# Patient Record
Sex: FEMALE | Race: OTHER | HISPANIC OR LATINO | URBAN - METROPOLITAN AREA
[De-identification: names, ages, dates, MRNs, and addresses within clinical notes are randomized per-mention and may not be internally consistent; named-entity substitution may affect disease eponyms.]

---

## 2024-01-01 ENCOUNTER — INPATIENT (INPATIENT)
Facility: HOSPITAL | Age: 24
LOS: 10 days | Discharge: HOME | DRG: 682 | End: 2024-01-12
Attending: STUDENT IN AN ORGANIZED HEALTH CARE EDUCATION/TRAINING PROGRAM | Admitting: STUDENT IN AN ORGANIZED HEALTH CARE EDUCATION/TRAINING PROGRAM
Payer: MEDICAID

## 2024-01-01 VITALS
RESPIRATION RATE: 36 BRPM | SYSTOLIC BLOOD PRESSURE: 113 MMHG | TEMPERATURE: 99 F | OXYGEN SATURATION: 99 % | DIASTOLIC BLOOD PRESSURE: 79 MMHG | HEART RATE: 120 BPM

## 2024-01-01 DIAGNOSIS — N17.9 ACUTE KIDNEY FAILURE, UNSPECIFIED: ICD-10-CM

## 2024-01-01 LAB
ALBUMIN SERPL ELPH-MCNC: 3.9 G/DL — SIGNIFICANT CHANGE UP (ref 3.3–5.2)
ALBUMIN SERPL ELPH-MCNC: 3.9 G/DL — SIGNIFICANT CHANGE UP (ref 3.3–5.2)
ALP SERPL-CCNC: 58 U/L — SIGNIFICANT CHANGE UP (ref 40–120)
ALP SERPL-CCNC: 58 U/L — SIGNIFICANT CHANGE UP (ref 40–120)
ALT FLD-CCNC: 11 U/L — SIGNIFICANT CHANGE UP
ALT FLD-CCNC: 11 U/L — SIGNIFICANT CHANGE UP
AMPHET UR-MCNC: NEGATIVE — SIGNIFICANT CHANGE UP
AMPHET UR-MCNC: NEGATIVE — SIGNIFICANT CHANGE UP
ANION GAP SERPL CALC-SCNC: 15 MMOL/L — SIGNIFICANT CHANGE UP (ref 5–17)
ANION GAP SERPL CALC-SCNC: 15 MMOL/L — SIGNIFICANT CHANGE UP (ref 5–17)
APAP SERPL-MCNC: <3 UG/ML — LOW (ref 10–26)
APAP SERPL-MCNC: <3 UG/ML — LOW (ref 10–26)
AST SERPL-CCNC: 27 U/L — SIGNIFICANT CHANGE UP
AST SERPL-CCNC: 27 U/L — SIGNIFICANT CHANGE UP
BARBITURATES UR SCN-MCNC: NEGATIVE — SIGNIFICANT CHANGE UP
BARBITURATES UR SCN-MCNC: NEGATIVE — SIGNIFICANT CHANGE UP
BASOPHILS # BLD AUTO: 0.04 K/UL — SIGNIFICANT CHANGE UP (ref 0–0.2)
BASOPHILS # BLD AUTO: 0.04 K/UL — SIGNIFICANT CHANGE UP (ref 0–0.2)
BASOPHILS NFR BLD AUTO: 0.7 % — SIGNIFICANT CHANGE UP (ref 0–2)
BASOPHILS NFR BLD AUTO: 0.7 % — SIGNIFICANT CHANGE UP (ref 0–2)
BENZODIAZ UR-MCNC: POSITIVE
BENZODIAZ UR-MCNC: POSITIVE
BILIRUB SERPL-MCNC: 0.3 MG/DL — LOW (ref 0.4–2)
BILIRUB SERPL-MCNC: 0.3 MG/DL — LOW (ref 0.4–2)
BUN SERPL-MCNC: 30.2 MG/DL — HIGH (ref 8–20)
BUN SERPL-MCNC: 30.2 MG/DL — HIGH (ref 8–20)
CALCIUM SERPL-MCNC: 8.4 MG/DL — SIGNIFICANT CHANGE UP (ref 8.4–10.5)
CALCIUM SERPL-MCNC: 8.4 MG/DL — SIGNIFICANT CHANGE UP (ref 8.4–10.5)
CALCIUM UR-MCNC: 3.5 MG/DL — SIGNIFICANT CHANGE UP
CALCIUM UR-MCNC: 3.5 MG/DL — SIGNIFICANT CHANGE UP
CHLORIDE SERPL-SCNC: 108 MMOL/L — SIGNIFICANT CHANGE UP (ref 96–108)
CHLORIDE SERPL-SCNC: 108 MMOL/L — SIGNIFICANT CHANGE UP (ref 96–108)
CHLORIDE UR-SCNC: 35 MMOL/L — SIGNIFICANT CHANGE UP
CHLORIDE UR-SCNC: 35 MMOL/L — SIGNIFICANT CHANGE UP
CK SERPL-CCNC: 106 U/L — SIGNIFICANT CHANGE UP (ref 25–170)
CK SERPL-CCNC: 106 U/L — SIGNIFICANT CHANGE UP (ref 25–170)
CK SERPL-CCNC: 141 U/L — SIGNIFICANT CHANGE UP (ref 25–170)
CK SERPL-CCNC: 141 U/L — SIGNIFICANT CHANGE UP (ref 25–170)
CO2 SERPL-SCNC: 17 MMOL/L — LOW (ref 22–29)
CO2 SERPL-SCNC: 17 MMOL/L — LOW (ref 22–29)
COCAINE METAB.OTHER UR-MCNC: NEGATIVE — SIGNIFICANT CHANGE UP
COCAINE METAB.OTHER UR-MCNC: NEGATIVE — SIGNIFICANT CHANGE UP
CREAT SERPL-MCNC: 3.95 MG/DL — HIGH (ref 0.5–1.3)
CREAT SERPL-MCNC: 3.95 MG/DL — HIGH (ref 0.5–1.3)
CREAT SERPL-MCNC: 4.33 MG/DL — HIGH (ref 0.5–1.3)
CREAT SERPL-MCNC: 4.33 MG/DL — HIGH (ref 0.5–1.3)
EGFR: 13 ML/MIN/1.73M2 — LOW
EGFR: 13 ML/MIN/1.73M2 — LOW
EGFR: 14 ML/MIN/1.73M2 — LOW
EGFR: 14 ML/MIN/1.73M2 — LOW
EOSINOPHIL # BLD AUTO: 0.24 K/UL — SIGNIFICANT CHANGE UP (ref 0–0.5)
EOSINOPHIL # BLD AUTO: 0.24 K/UL — SIGNIFICANT CHANGE UP (ref 0–0.5)
EOSINOPHIL NFR BLD AUTO: 4.3 % — SIGNIFICANT CHANGE UP (ref 0–6)
EOSINOPHIL NFR BLD AUTO: 4.3 % — SIGNIFICANT CHANGE UP (ref 0–6)
ETHANOL SERPL-MCNC: <10 MG/DL — SIGNIFICANT CHANGE UP (ref 0–9)
ETHANOL SERPL-MCNC: <10 MG/DL — SIGNIFICANT CHANGE UP (ref 0–9)
GLUCOSE SERPL-MCNC: 87 MG/DL — SIGNIFICANT CHANGE UP (ref 70–99)
GLUCOSE SERPL-MCNC: 87 MG/DL — SIGNIFICANT CHANGE UP (ref 70–99)
HBV CORE IGM SER-ACNC: SIGNIFICANT CHANGE UP
HBV CORE IGM SER-ACNC: SIGNIFICANT CHANGE UP
HBV SURFACE AB SER-ACNC: SIGNIFICANT CHANGE UP
HBV SURFACE AB SER-ACNC: SIGNIFICANT CHANGE UP
HBV SURFACE AG SER-ACNC: SIGNIFICANT CHANGE UP
HBV SURFACE AG SER-ACNC: SIGNIFICANT CHANGE UP
HCG SERPL-ACNC: <4 MIU/ML — SIGNIFICANT CHANGE UP
HCG SERPL-ACNC: <4 MIU/ML — SIGNIFICANT CHANGE UP
HCT VFR BLD CALC: 26.9 % — LOW (ref 34.5–45)
HCT VFR BLD CALC: 26.9 % — LOW (ref 34.5–45)
HCV AB S/CO SERPL IA: 0.12 S/CO — SIGNIFICANT CHANGE UP (ref 0–0.99)
HCV AB S/CO SERPL IA: 0.12 S/CO — SIGNIFICANT CHANGE UP (ref 0–0.99)
HCV AB SERPL-IMP: SIGNIFICANT CHANGE UP
HCV AB SERPL-IMP: SIGNIFICANT CHANGE UP
HGB BLD-MCNC: 8.6 G/DL — LOW (ref 11.5–15.5)
HGB BLD-MCNC: 8.6 G/DL — LOW (ref 11.5–15.5)
IMM GRANULOCYTES NFR BLD AUTO: 0.4 % — SIGNIFICANT CHANGE UP (ref 0–0.9)
IMM GRANULOCYTES NFR BLD AUTO: 0.4 % — SIGNIFICANT CHANGE UP (ref 0–0.9)
LYMPHOCYTES # BLD AUTO: 1.1 K/UL — SIGNIFICANT CHANGE UP (ref 1–3.3)
LYMPHOCYTES # BLD AUTO: 1.1 K/UL — SIGNIFICANT CHANGE UP (ref 1–3.3)
LYMPHOCYTES # BLD AUTO: 19.9 % — SIGNIFICANT CHANGE UP (ref 13–44)
LYMPHOCYTES # BLD AUTO: 19.9 % — SIGNIFICANT CHANGE UP (ref 13–44)
MCHC RBC-ENTMCNC: 27.9 PG — SIGNIFICANT CHANGE UP (ref 27–34)
MCHC RBC-ENTMCNC: 27.9 PG — SIGNIFICANT CHANGE UP (ref 27–34)
MCHC RBC-ENTMCNC: 32 GM/DL — SIGNIFICANT CHANGE UP (ref 32–36)
MCHC RBC-ENTMCNC: 32 GM/DL — SIGNIFICANT CHANGE UP (ref 32–36)
MCV RBC AUTO: 87.3 FL — SIGNIFICANT CHANGE UP (ref 80–100)
MCV RBC AUTO: 87.3 FL — SIGNIFICANT CHANGE UP (ref 80–100)
METHADONE UR-MCNC: NEGATIVE — SIGNIFICANT CHANGE UP
METHADONE UR-MCNC: NEGATIVE — SIGNIFICANT CHANGE UP
MONOCYTES # BLD AUTO: 0.72 K/UL — SIGNIFICANT CHANGE UP (ref 0–0.9)
MONOCYTES # BLD AUTO: 0.72 K/UL — SIGNIFICANT CHANGE UP (ref 0–0.9)
MONOCYTES NFR BLD AUTO: 13 % — SIGNIFICANT CHANGE UP (ref 2–14)
MONOCYTES NFR BLD AUTO: 13 % — SIGNIFICANT CHANGE UP (ref 2–14)
NEUTROPHILS # BLD AUTO: 3.4 K/UL — SIGNIFICANT CHANGE UP (ref 1.8–7.4)
NEUTROPHILS # BLD AUTO: 3.4 K/UL — SIGNIFICANT CHANGE UP (ref 1.8–7.4)
NEUTROPHILS NFR BLD AUTO: 61.7 % — SIGNIFICANT CHANGE UP (ref 43–77)
NEUTROPHILS NFR BLD AUTO: 61.7 % — SIGNIFICANT CHANGE UP (ref 43–77)
OPIATES UR-MCNC: NEGATIVE — SIGNIFICANT CHANGE UP
OPIATES UR-MCNC: NEGATIVE — SIGNIFICANT CHANGE UP
PCP SPEC-MCNC: SIGNIFICANT CHANGE UP
PCP SPEC-MCNC: SIGNIFICANT CHANGE UP
PCP UR-MCNC: NEGATIVE — SIGNIFICANT CHANGE UP
PCP UR-MCNC: NEGATIVE — SIGNIFICANT CHANGE UP
PLATELET # BLD AUTO: 257 K/UL — SIGNIFICANT CHANGE UP (ref 150–400)
PLATELET # BLD AUTO: 257 K/UL — SIGNIFICANT CHANGE UP (ref 150–400)
POTASSIUM SERPL-MCNC: 4.6 MMOL/L — SIGNIFICANT CHANGE UP (ref 3.5–5.3)
POTASSIUM SERPL-MCNC: 4.6 MMOL/L — SIGNIFICANT CHANGE UP (ref 3.5–5.3)
POTASSIUM SERPL-SCNC: 4.6 MMOL/L — SIGNIFICANT CHANGE UP (ref 3.5–5.3)
POTASSIUM SERPL-SCNC: 4.6 MMOL/L — SIGNIFICANT CHANGE UP (ref 3.5–5.3)
POTASSIUM UR-SCNC: 33 MMOL/L — SIGNIFICANT CHANGE UP
POTASSIUM UR-SCNC: 33 MMOL/L — SIGNIFICANT CHANGE UP
PROT SERPL-MCNC: 7.1 G/DL — SIGNIFICANT CHANGE UP (ref 6.6–8.7)
PROT SERPL-MCNC: 7.1 G/DL — SIGNIFICANT CHANGE UP (ref 6.6–8.7)
RBC # BLD: 3.08 M/UL — LOW (ref 3.8–5.2)
RBC # BLD: 3.08 M/UL — LOW (ref 3.8–5.2)
RBC # FLD: 15.4 % — HIGH (ref 10.3–14.5)
RBC # FLD: 15.4 % — HIGH (ref 10.3–14.5)
SALICYLATES SERPL-MCNC: <0.6 MG/DL — LOW (ref 10–20)
SALICYLATES SERPL-MCNC: <0.6 MG/DL — LOW (ref 10–20)
SODIUM SERPL-SCNC: 140 MMOL/L — SIGNIFICANT CHANGE UP (ref 135–145)
SODIUM SERPL-SCNC: 140 MMOL/L — SIGNIFICANT CHANGE UP (ref 135–145)
SODIUM UR-SCNC: 42 MMOL/L — SIGNIFICANT CHANGE UP
SODIUM UR-SCNC: 42 MMOL/L — SIGNIFICANT CHANGE UP
THC UR QL: NEGATIVE — SIGNIFICANT CHANGE UP
THC UR QL: NEGATIVE — SIGNIFICANT CHANGE UP
WBC # BLD: 5.52 K/UL — SIGNIFICANT CHANGE UP (ref 3.8–10.5)
WBC # BLD: 5.52 K/UL — SIGNIFICANT CHANGE UP (ref 3.8–10.5)
WBC # FLD AUTO: 5.52 K/UL — SIGNIFICANT CHANGE UP (ref 3.8–10.5)
WBC # FLD AUTO: 5.52 K/UL — SIGNIFICANT CHANGE UP (ref 3.8–10.5)

## 2024-01-01 PROCEDURE — 70450 CT HEAD/BRAIN W/O DYE: CPT | Mod: 26,MG

## 2024-01-01 PROCEDURE — 99285 EMERGENCY DEPT VISIT HI MDM: CPT

## 2024-01-01 PROCEDURE — 99222 1ST HOSP IP/OBS MODERATE 55: CPT

## 2024-01-01 PROCEDURE — G1004: CPT

## 2024-01-01 PROCEDURE — 74176 CT ABD & PELVIS W/O CONTRAST: CPT | Mod: 26

## 2024-01-01 PROCEDURE — 76770 US EXAM ABDO BACK WALL COMP: CPT | Mod: 26

## 2024-01-01 RX ORDER — ONDANSETRON 8 MG/1
4 TABLET, FILM COATED ORAL EVERY 8 HOURS
Refills: 0 | Status: DISCONTINUED | OUTPATIENT
Start: 2024-01-01 | End: 2024-01-12

## 2024-01-01 RX ORDER — LANOLIN ALCOHOL/MO/W.PET/CERES
3 CREAM (GRAM) TOPICAL AT BEDTIME
Refills: 0 | Status: DISCONTINUED | OUTPATIENT
Start: 2024-01-01 | End: 2024-01-12

## 2024-01-01 RX ORDER — ENOXAPARIN SODIUM 100 MG/ML
30 INJECTION SUBCUTANEOUS EVERY 24 HOURS
Refills: 0 | Status: DISCONTINUED | OUTPATIENT
Start: 2024-01-01 | End: 2024-01-02

## 2024-01-01 RX ORDER — FOLIC ACID 0.8 MG
1 TABLET ORAL DAILY
Refills: 0 | Status: DISCONTINUED | OUTPATIENT
Start: 2024-01-01 | End: 2024-01-12

## 2024-01-01 RX ORDER — SODIUM CHLORIDE 9 MG/ML
1000 INJECTION, SOLUTION INTRAVENOUS
Refills: 0 | Status: DISCONTINUED | OUTPATIENT
Start: 2024-01-01 | End: 2024-01-01

## 2024-01-01 RX ORDER — SODIUM CHLORIDE 9 MG/ML
1000 INJECTION, SOLUTION INTRAVENOUS
Refills: 0 | Status: DISCONTINUED | OUTPATIENT
Start: 2024-01-01 | End: 2024-01-02

## 2024-01-01 RX ORDER — SODIUM CHLORIDE 9 MG/ML
3 INJECTION INTRAMUSCULAR; INTRAVENOUS; SUBCUTANEOUS EVERY 8 HOURS
Refills: 0 | Status: DISCONTINUED | OUTPATIENT
Start: 2024-01-01 | End: 2024-01-12

## 2024-01-01 RX ORDER — ACETAMINOPHEN 500 MG
650 TABLET ORAL EVERY 6 HOURS
Refills: 0 | Status: DISCONTINUED | OUTPATIENT
Start: 2024-01-01 | End: 2024-01-12

## 2024-01-01 RX ORDER — ENOXAPARIN SODIUM 100 MG/ML
40 INJECTION SUBCUTANEOUS EVERY 24 HOURS
Refills: 0 | Status: DISCONTINUED | OUTPATIENT
Start: 2024-01-01 | End: 2024-01-01

## 2024-01-01 RX ADMIN — Medication 2 MILLIGRAM(S): at 03:21

## 2024-01-01 RX ADMIN — Medication 650 MILLIGRAM(S): at 23:32

## 2024-01-01 RX ADMIN — Medication 1 MILLIGRAM(S): at 12:26

## 2024-01-01 RX ADMIN — SODIUM CHLORIDE 100 MILLILITER(S): 9 INJECTION, SOLUTION INTRAVENOUS at 12:26

## 2024-01-01 RX ADMIN — Medication 1 TABLET(S): at 12:26

## 2024-01-01 RX ADMIN — Medication 650 MILLIGRAM(S): at 23:02

## 2024-01-01 RX ADMIN — SODIUM CHLORIDE 3 MILLILITER(S): 9 INJECTION INTRAMUSCULAR; INTRAVENOUS; SUBCUTANEOUS at 14:35

## 2024-01-01 RX ADMIN — SODIUM CHLORIDE 3 MILLILITER(S): 9 INJECTION INTRAMUSCULAR; INTRAVENOUS; SUBCUTANEOUS at 05:58

## 2024-01-01 RX ADMIN — SODIUM CHLORIDE 3 MILLILITER(S): 9 INJECTION INTRAMUSCULAR; INTRAVENOUS; SUBCUTANEOUS at 23:07

## 2024-01-01 NOTE — CONSULT NOTE ADULT - ASSESSMENT
A) RUQ abdominal pain, Hx  CRF not presently  followed; Anemia, metabolic  acidosis.    P) Sono, iv  fluid  with  bicarb.. Urine  studies.

## 2024-01-01 NOTE — ED ADULT NURSE REASSESSMENT NOTE - NS ED NURSE REASSESS COMMENT FT1
pt seen by renal, Dr Gallego, for evaluation. pt with hx of CKD, but stopped receiving treatment. pt awake, alert, taken to bathroom in wheelchair. following all commands. even and unlabored resps present.

## 2024-01-01 NOTE — CHART NOTE - NSCHARTNOTEFT_GEN_A_CORE
patient seen at bedside and still complains of fatigue. patient seen with phone trnasltor.   a/p  as per hpi  type and screen ordered   ct abdomen ordered for abnromal renal us

## 2024-01-01 NOTE — ED ADULT NURSE NOTE - OBJECTIVE STATEMENT
Pt is 34 year old male BIBA for convulsing in [employer] basement for unknown period of time. pt denies taking any drugs or alcohol use. pt states she has history of anxiety but denies taking any drugs or drinking. pt placed in yellow gown on monitor. according to EMS pt given versed and controlled convulsions. Pt is 34 year old male BIBA for convulsing in Muslim basement for unknown period of time. pt denies taking any drugs or alcohol use. pt states she has history of anxiety but denies taking any drugs or drinking. pt placed in yellow gown on monitor. according to EMS pt given versed and controlled convulsions.

## 2024-01-01 NOTE — ED ADULT TRIAGE NOTE - CHIEF COMPLAINT QUOTE
pateint found in [employer] basement, answering questions stating she has pain all over, hyperventilating, stating her throat is sore, given 5mg versed, no trauma reported, patient is denying drugs or alcohol pateint found in [employer] basement, answering questions stating she has pain all over, hyperventilating, stating her throat is sore, given 5mg versed, no trauma reported, patient is denying drugs or alcohol

## 2024-01-01 NOTE — ED PROVIDER NOTE - CLINICAL SUMMARY MEDICAL DECISION MAKING FREE TEXT BOX
patient presenting with altered mental status, no signs of trauma, very anxious appearing, will obtain labs, tox workup, Ativan given for symptom control with improvement.  Observe on cardiac monitor

## 2024-01-01 NOTE — ED PROVIDER NOTE - OBJECTIVE STATEMENT
Patient is a 34-year-old female with  unknown past medical history presenting with altered mental status.  According to EMS patient was found at [facility] and was found acting erratic.  EMS notes when they arrived on the scene patient was thrashing about unable to obtain further history.     Patient was given 5 mg of IV Versed by EMS for symptom control with significant improvement in symptoms that patient still symptomatic on arrival. No one on scene seem to know much about the patient's past medical history but denied her ingesting any alcohol or drugs this evening.  On arrival to emergency department patient hyperventilating tearful stating she has pain all over.  Rest of history limited secondary to patient's mental status. Patient is a 34-year-old female with  unknown past medical history presenting with altered mental status.  According to EMS patient was found at [facility] and was found acting erratic.  EMS notes when they arrived on the scene patient was thrashing about unable to obtain further history.     Patient was given 5 mg of IV Versed by EMS for symptom control with significant improvement in symptoms that patient still symptomatic on arrival. No one on scene seem to know much about the patient's past medical history but denied her ingesting any alcohol or drugs this evening.  On arrival to emergency department patient hyperventilating tearful stating she has pain all over.  Rest of history limited secondary to patient's mental status.

## 2024-01-01 NOTE — H&P ADULT - ASSESSMENT
22 y/o female with episode of agitation, MARIVEL on CKD? Anemia     Agitation:  -No prodrome, related to food ingestion by history  -Panic attack? Pain related?   -CTH neg  -Currently sedated but vitals stable, arouses to verbal/tactile stimuli  -Cont. Monitor  -fall risk  -NPO except meds for now  -Utox  -VC boots  -Await  to call back or come in for more accurate collateral information/Medical history     MARIVEL on CKD?  -Reported to ED attending baseline 2.5  -Check CPK   -Bicarb low likely due to CKD/lytes normal  -AG normal  -Check U/A  -IVF, trend BMP  -Nephrology called by ED  -US pending    Anemia:  -Likely on basis of CKD  -Check iron studies, stool for OB  -FA/MVI  -Repeat CBC in AM    Discussed with Friends/ED Nursing/Attending   No ED /language line not working at this time 24 y/o female with episode of agitation, MARIVEL on CKD? Anemia     Agitation:  -No prodrome, related to food ingestion by history  -Panic attack? Pain related?   -CTH neg  -Currently sedated but vitals stable, arouses to verbal/tactile stimuli  -Cont. Monitor  -fall risk  -NPO except meds for now  -Utox  -VC boots  -Await  to call back or come in for more accurate collateral information/Medical history     MARIVEL on CKD?  -Reported to ED attending baseline 2.5  -Check CPK   -Bicarb low likely due to CKD/lytes normal  -AG normal  -Check U/A  -IVF, trend BMP  -Nephrology called by ED  -US pending    Anemia:  -Likely on basis of CKD  -Check iron studies, stool for OB  -FA/MVI  -Repeat CBC in AM    Discussed with Friends/ED Nursing/Attending   No ED /language line not working at this time

## 2024-01-01 NOTE — ED ADULT NURSE NOTE - NSFALLUNIVINTERV_ED_ALL_ED
Bed/Stretcher in lowest position, wheels locked, appropriate side rails in place/Call bell, personal items and telephone in reach/Instruct patient to call for assistance before getting out of bed/chair/stretcher/Non-slip footwear applied when patient is off stretcher/[place] to call system/Physically safe environment - no spills, clutter or unnecessary equipment/Purposeful proactive rounding/Room/bathroom lighting operational, light cord in reach Bed/Stretcher in lowest position, wheels locked, appropriate side rails in place/Call bell, personal items and telephone in reach/Instruct patient to call for assistance before getting out of bed/chair/stretcher/Non-slip footwear applied when patient is off stretcher/[place] to call system/Physically safe environment - no spills, clutter or unnecessary equipment/Purposeful proactive rounding/Room/bathroom lighting operational, light cord in reach

## 2024-01-01 NOTE — ED ADULT NURSE REASSESSMENT NOTE - NS ED NURSE REASSESS COMMENT FT1
pt received in shift change report, pt sitting up in cart sleeping, arousable to tactile stimuli. pt is in yellow gown, stable vitals noted, awaiting transport to [facility] at this time. even and unlabored resps present, IV site patent, NSR on monitor noted. pt received in shift change report, pt sitting up in cart sleeping, arousable to tactile stimuli. pt is in yellow gown, stable vitals noted, awaiting transport to [facility] at this time. even and unlabored resps present, IV site patent, NSR on monitor noted.

## 2024-01-01 NOTE — H&P ADULT - REASON FOR ADMISSION
MARIVEL on CKD?  Confusion Principal Discharge DX:	Sinusitis  Secondary Diagnosis:	Dehydration  Secondary Diagnosis:	Proctocolitis

## 2024-01-01 NOTE — ED ADULT NURSE REASSESSMENT NOTE - NS ED NURSE REASSESS COMMENT FT1
pt remains sleeping, resting comfortably, even and unlabored resps present with otherwise stable vital signs. NSR on monitor, CT completed and awaiting results. pt awaiting bed assignment. ambulatory to bathroom with assistance. tolerating PO. IV site patent, Bicarb gtt infusing as ordered. pt more awake, alert to person place and time.

## 2024-01-01 NOTE — H&P ADULT - NSICDXPASTSURGICALHX_GEN_ALL_CORE_FT
[facility]    Brief Operative Note    Pre-operative diagnosis: Laryngeal cancer (H) [C32.9]  Post-operative diagnosis Same as pre-operative diagnosis    Procedure: MICROLARYNGOSCOPY CO2 laser standby, resection of vocal fold lesion, stent removal, biopsy, N/A - Throat  steroid injection, N/A - Update    Surgeon: Surgeon(s) and Role:     * Josephine Malone MD - Primary     * Abril Sifuentes MD - Resident - Assisting  Anesthesia: General   Estimated Blood Loss: None    Drains: None  Specimens:   ID Type Source Tests Collected by Time Destination   1 : anterior commissure inferior biopsy Tissue Other SURGICAL PATHOLOGY EXAM Josephine Malone MD 12/1/2023  3:47 PM    2 : anterior commissure superior biopsy Tissue Other SURGICAL PATHOLOGY EXAM Josephine Malone MD 12/1/2023  3:48 PM      Findings:  Stent was removed. Anterior commissure masses observed.   Complications: None.  Implants:   Implant Name Type Inv. Item Serial No.  Lot No. LRB No. Used Action   CATH FOLYSIL 14FR 10ML [id number] - [id number]  CATH FOLYSIL 14FR 10ML [id number]  COLOPLAST [id number] N/A 1 Explanted              PAST SURGICAL HISTORY:  No significant past surgical history

## 2024-01-01 NOTE — CONSULT NOTE ADULT - SUBJECTIVE AND OBJECTIVE BOX
Patient is a 34y old  Female who presents with a chief complaint of MARIVEL on CKD?  Confusion (01 Jan 2024 05:57)   Acute  renal failure.  HPI:  24 y/o female whose real name is Trinh Clark. History obtained from 2 [employer]ian[employer] friends at bedside who arrived after Patient brought from field to ED for c/o acting strange at a [employer]ian[employer] event. Language line not working at this time, History obtained from ED chart and 2 friends who know patient peripherally. Patient was apparently in her normal state of health at a [employer]ian[employer] event and shortly after eating rice/beans and meat started c/o abdominal pain and cramping and then vomited. Friends who witnessed it said they called EMS due to patient being very agitated. No one else reportedly was sick from eating the same food. No recent illnesses, travel, or sick contacts reported. Patient has never used illicit drugs and does not use alcohol. She has a hx of "kidney problems" and used to be on medications for it but self discontinued " a while ago." In th field Patient given Versed and then Ativan in ED to allow for exam and labs. Vitals with tachycardia/tachypnea on arrival due to agitation. ED attending states patient denying any trauma, or assault. No obvious injury on exam. Labs with Hbg of 8.6 and Cr. of 3.95 with reported baseline of 2.5. No reported mental health history.     : Tita Clark [phone]-No answer at this time  (01 Jan 2024 05:57)   Hx renal stones x1 not aware of type, no other renal  problems; elevated creatinine on admission.    PAST MEDICAL & SURGICAL HISTORY:  Chronic kidney disease, unspecified CKD stage      No significant past surgical history          FAMILY HISTORY:  NC    Social History:Non smoker    MEDICATIONS  (STANDING):  dextrose 5% + sodium chloride 0.9%. 1000 milliLiter(s) (125 mL/Hr) IV Continuous <Continuous>  folic acid 1 milliGRAM(s) Oral daily  multivitamin 1 Tablet(s) Oral daily  sodium chloride 0.9% lock flush 3 milliLiter(s) IV Push every 8 hours    MEDICATIONS  (PRN):  acetaminophen     Tablet .. 650 milliGRAM(s) Oral every 6 hours PRN Temp greater or equal to 38C (100.4F), Mild Pain (1 - 3)  aluminum hydroxide/magnesium hydroxide/simethicone Suspension 30 milliLiter(s) Oral every 4 hours PRN Dyspepsia  LORazepam   Injectable 1 milliGRAM(s) IV Push every 8 hours PRN Agitation  melatonin 3 milliGRAM(s) Oral at bedtime PRN Insomnia  ondansetron Injectable 4 milliGRAM(s) IV Push every 8 hours PRN Nausea and/or Vomiting   Meds reviewed    Allergies    Allergy Status Unknown        REVIEW OF SYSTEMS:  As above    ALLERY AND IMMUNOLOGIC: No hives or eczema      Vital Signs Last 24 Hrs  T(C): 36.7 (01 Jan 2024 07:52), Max: 37.1 (01 Jan 2024 03:10)  T(F): 98.1 (01 Jan 2024 07:52), Max: 98.8 (01 Jan 2024 03:10)  HR: 88 (01 Jan 2024 07:52) (88 - 120)  BP: 105/57 (01 Jan 2024 07:52) (105/57 - 116/78)  BP(mean): 90 (01 Jan 2024 05:57) (90 - 90)  RR: 16 (01 Jan 2024 07:52) (16 - 36)  SpO2: 100% (01 Jan 2024 07:52) (98% - 100%)    Parameters below as of 01 Jan 2024 05:57  Patient On (Oxygen Delivery Method): room air           PHYSICAL EXAM:    GENERAL: appears acutely ill, oriented.  HEAD:  Atraumatic, Normocephalic  EYES: EOMI, PERRLA, conjunctiva and sclera clear  ENMT: No tonsillar erythema, exudates, or enlargement; Moist mucous membranes, Good dentition, No lesions  NECK: Supple, neck  veins wnl  NERVOUS SYSTEM:  Alert & Oriented X3, Good concentration; Motor Strength wnl upper and lower extremities  ;CHEST/LUNG: Clear to percussion bilaterally; No rales, rhonchi, wheezing, or rubs. No 02  HEART: Regular rate and rhythm; No murmurs, rubs, or gallops  ABDOMEN: Soft, tender  right side  EXTREMITIES:  +2 - +3 leg edema with sligt erythema edemano  edemahadenopathy noted  SKIN: No rashes or lesions, pale  : neg    LABS:                        8.6    5.52  )-----------( 257      ( 01 Jan 2024 03:50 )             26.9     01-01    140  |  108  |  30.2<H>  ----------------------------<  87  4.6   |  17.0<L>  |  3.95<H>    Ca    8.4      01 Jan 2024 03:50    TPro  7.1  /  Alb  3.9  /  TBili  0.3<L>  /  DBili  x   /  AST  27  /  ALT  11  /  AlkPhos  58  01-01      Urinalysis Basic - ( 01 Jan 2024 03:50 )    Color: x / Appearance: x / SG: x / pH: x  Gluc: 87 mg/dL / Ketone: x  / Bili: x / Urobili: x   Blood: x / Protein: x / Nitrite: x   Leuk Esterase: x / RBC: x / WBC x   Sq Epi: x / Non Sq Epi: x / Bacteria: x              RADIOLOGY & ADDITIONAL TESTS:   Patient is a 34y old  Female who presents with a chief complaint of MARIVEL on CKD?  Confusion (01 Jan 2024 05:57)   Acute  renal failure.  HPI:  22 y/o female whose real name is Trinh Clark. History obtained from 2 Gnosticism friends at bedside who arrived after Patient brought from field to ED for c/o acting strange at a Gnostic[employer] event. Language line not working at this time, History obtained from ED chart and 2 friends who know patient peripherally. Patient was apparently in her normal state of health at a Gnosticism event and shortly after eating rice/beans and meat started c/o abdominal pain and cramping and then vomited. Friends who witnessed it said they called EMS due to patient being very agitated. No one else reportedly was sick from eating the same food. No recent illnesses, travel, or sick contacts reported. Patient has never used illicit drugs and does not use alcohol. She has a hx of "kidney problems" and used to be on medications for it but self discontinued " a while ago." In th field Patient given Versed and then Ativan in ED to allow for exam and labs. Vitals with tachycardia/tachypnea on arrival due to agitation. ED attending states patient denying any trauma, or assault. No obvious injury on exam. Labs with Hbg of 8.6 and Cr. of 3.95 with reported baseline of 2.5. No reported mental health history.     : Tita Clark [phone]-No answer at this time  (01 Jan 2024 05:57)   Hx renal stones x1 not aware of type, no other renal  problems; elevated creatinine on admission.    PAST MEDICAL & SURGICAL HISTORY:  Chronic kidney disease, unspecified CKD stage      No significant past surgical history          FAMILY HISTORY:  NC    Social History:Non smoker    MEDICATIONS  (STANDING):  dextrose 5% + sodium chloride 0.9%. 1000 milliLiter(s) (125 mL/Hr) IV Continuous <Continuous>  folic acid 1 milliGRAM(s) Oral daily  multivitamin 1 Tablet(s) Oral daily  sodium chloride 0.9% lock flush 3 milliLiter(s) IV Push every 8 hours    MEDICATIONS  (PRN):  acetaminophen     Tablet .. 650 milliGRAM(s) Oral every 6 hours PRN Temp greater or equal to 38C (100.4F), Mild Pain (1 - 3)  aluminum hydroxide/magnesium hydroxide/simethicone Suspension 30 milliLiter(s) Oral every 4 hours PRN Dyspepsia  LORazepam   Injectable 1 milliGRAM(s) IV Push every 8 hours PRN Agitation  melatonin 3 milliGRAM(s) Oral at bedtime PRN Insomnia  ondansetron Injectable 4 milliGRAM(s) IV Push every 8 hours PRN Nausea and/or Vomiting   Meds reviewed    Allergies    Allergy Status Unknown        REVIEW OF SYSTEMS:  As above    ALLERY AND IMMUNOLOGIC: No hives or eczema      Vital Signs Last 24 Hrs  T(C): 36.7 (01 Jan 2024 07:52), Max: 37.1 (01 Jan 2024 03:10)  T(F): 98.1 (01 Jan 2024 07:52), Max: 98.8 (01 Jan 2024 03:10)  HR: 88 (01 Jan 2024 07:52) (88 - 120)  BP: 105/57 (01 Jan 2024 07:52) (105/57 - 116/78)  BP(mean): 90 (01 Jan 2024 05:57) (90 - 90)  RR: 16 (01 Jan 2024 07:52) (16 - 36)  SpO2: 100% (01 Jan 2024 07:52) (98% - 100%)    Parameters below as of 01 Jan 2024 05:57  Patient On (Oxygen Delivery Method): room air           PHYSICAL EXAM:    GENERAL: appears acutely ill, oriented.  HEAD:  Atraumatic, Normocephalic  EYES: EOMI, PERRLA, conjunctiva and sclera clear  ENMT: No tonsillar erythema, exudates, or enlargement; Moist mucous membranes, Good dentition, No lesions  NECK: Supple, neck  veins wnl  NERVOUS SYSTEM:  Alert & Oriented X3, Good concentration; Motor Strength wnl upper and lower extremities  ;CHEST/LUNG: Clear to percussion bilaterally; No rales, rhonchi, wheezing, or rubs. No 02  HEART: Regular rate and rhythm; No murmurs, rubs, or gallops  ABDOMEN: Soft, tender  right side  EXTREMITIES:  +2 - +3 leg edema with sligt erythema edemano  edemahadenopathy noted  SKIN: No rashes or lesions, pale  : neg    LABS:                        8.6    5.52  )-----------( 257      ( 01 Jan 2024 03:50 )             26.9     01-01    140  |  108  |  30.2<H>  ----------------------------<  87  4.6   |  17.0<L>  |  3.95<H>    Ca    8.4      01 Jan 2024 03:50    TPro  7.1  /  Alb  3.9  /  TBili  0.3<L>  /  DBili  x   /  AST  27  /  ALT  11  /  AlkPhos  58  01-01      Urinalysis Basic - ( 01 Jan 2024 03:50 )    Color: x / Appearance: x / SG: x / pH: x  Gluc: 87 mg/dL / Ketone: x  / Bili: x / Urobili: x   Blood: x / Protein: x / Nitrite: x   Leuk Esterase: x / RBC: x / WBC x   Sq Epi: x / Non Sq Epi: x / Bacteria: x              RADIOLOGY & ADDITIONAL TESTS:

## 2024-01-01 NOTE — ED ADULT NURSE NOTE - CHIEF COMPLAINT QUOTE
pateint found in [employer]ianist basement, answering questions stating she has pain all over, hyperventilating, stating her throat is sore, given 5mg versed, no trauma reported, patient is denying drugs or alcohol pateint found in Oriental orthodox basement, answering questions stating she has pain all over, hyperventilating, stating her throat is sore, given 5mg versed, no trauma reported, patient is denying drugs or alcohol

## 2024-01-01 NOTE — ED PROVIDER NOTE - PROGRESS NOTE DETAILS
Patient significantly improved.  On repeat evaluation she notes she was recently diagnosed with some sort of kidney disease but does not know the cause and states that her creatinine 2 weeks ago was 2.5.  On evaluation today creatinine is 3.95.  Will admit for further management due to worsening renal failure.  Renal consult called for the morning. renal sono ordered and will be followed by hospitalist

## 2024-01-01 NOTE — H&P ADULT - HISTORY OF PRESENT ILLNESS
24 y/o female whose real name is Trinh Clark. History obtained from 2 [employer] friends at bedside who arrived after Patient brought from field to ED for c/o acting strange at a [employer] event. Language line not working at this time, History obtained from ED chart and 2 friends who know patient peripherally. Patient was apparently in her normal state of health at a Synagogue event and shortly after eating rice/beans and meat started c/o abdominal pain and cramping and then vomited. Friends who witnessed it said they called EMS due to patient being very agitated. No one else reportedly was sick from eating the same food. No recent illnesses, travel, or sick contacts reported. Patient has never used illicit drugs and does not use alcohol. She has a hx of "kidney problems" and used to be on medications for it but self discontinued " a while ago." In th field Patient given Versed and then Ativan in ED to allow for exam and labs. Vitals with tachycardia/tachypnea on arrival due to agitation. ED attending states patient denying any trauma, or assault. No obvious injury on exam. Labs with Hbg of 8.6 and Cr. of 3.95 with reported baseline of 2.5. No reported mental health history.     : Tita Clark [phone]-No answer at this time  24 y/o female whose real name is Trinh Clark. History obtained from 2 W[employer] friends at bedside who arrived after Patient brought from field to ED for c/o acting strange at a Worship event. Language line not working at this time, History obtained from ED chart and 2 friends who know patient peripherally. Patient was apparently in her normal state of health at a Worship event and shortly after eating rice/beans and meat started c/o abdominal pain and cramping and then vomited. Friends who witnessed it said they called EMS due to patient being very agitated. No one else reportedly was sick from eating the same food. No recent illnesses, travel, or sick contacts reported. Patient has never used illicit drugs and does not use alcohol. She has a hx of "kidney problems" and used to be on medications for it but self discontinued " a while ago." In th field Patient given Versed and then Ativan in ED to allow for exam and labs. Vitals with tachycardia/tachypnea on arrival due to agitation. ED attending states patient denying any trauma, or assault. No obvious injury on exam. Labs with Hbg of 8.6 and Cr. of 3.95 with reported baseline of 2.5. No reported mental health history.     : Tita Clark [phone]-No answer at this time

## 2024-01-02 DIAGNOSIS — Z98.891 HISTORY OF UTERINE SCAR FROM PREVIOUS SURGERY: Chronic | ICD-10-CM

## 2024-01-02 LAB
ALBUMIN SERPL ELPH-MCNC: 3.7 G/DL — SIGNIFICANT CHANGE UP (ref 3.3–5.2)
ALBUMIN SERPL ELPH-MCNC: 3.7 G/DL — SIGNIFICANT CHANGE UP (ref 3.3–5.2)
ALBUMIN SERPL ELPH-MCNC: 3.8 G/DL — SIGNIFICANT CHANGE UP (ref 3.3–5.2)
ALBUMIN SERPL ELPH-MCNC: 3.8 G/DL — SIGNIFICANT CHANGE UP (ref 3.3–5.2)
ALP SERPL-CCNC: 58 U/L — SIGNIFICANT CHANGE UP (ref 40–120)
ALP SERPL-CCNC: 58 U/L — SIGNIFICANT CHANGE UP (ref 40–120)
ALP SERPL-CCNC: 63 U/L — SIGNIFICANT CHANGE UP (ref 40–120)
ALP SERPL-CCNC: 63 U/L — SIGNIFICANT CHANGE UP (ref 40–120)
ALT FLD-CCNC: 7 U/L — SIGNIFICANT CHANGE UP
ALT FLD-CCNC: 7 U/L — SIGNIFICANT CHANGE UP
ALT FLD-CCNC: 8 U/L — SIGNIFICANT CHANGE UP
ALT FLD-CCNC: 8 U/L — SIGNIFICANT CHANGE UP
ANION GAP SERPL CALC-SCNC: 14 MMOL/L — SIGNIFICANT CHANGE UP (ref 5–17)
ANION GAP SERPL CALC-SCNC: 14 MMOL/L — SIGNIFICANT CHANGE UP (ref 5–17)
ANION GAP SERPL CALC-SCNC: 18 MMOL/L — HIGH (ref 5–17)
ANION GAP SERPL CALC-SCNC: 18 MMOL/L — HIGH (ref 5–17)
AST SERPL-CCNC: 13 U/L — SIGNIFICANT CHANGE UP
AST SERPL-CCNC: 13 U/L — SIGNIFICANT CHANGE UP
AST SERPL-CCNC: 15 U/L — SIGNIFICANT CHANGE UP
AST SERPL-CCNC: 15 U/L — SIGNIFICANT CHANGE UP
BASOPHILS # BLD AUTO: 0.03 K/UL — SIGNIFICANT CHANGE UP (ref 0–0.2)
BASOPHILS # BLD AUTO: 0.03 K/UL — SIGNIFICANT CHANGE UP (ref 0–0.2)
BASOPHILS NFR BLD AUTO: 0.4 % — SIGNIFICANT CHANGE UP (ref 0–2)
BASOPHILS NFR BLD AUTO: 0.4 % — SIGNIFICANT CHANGE UP (ref 0–2)
BILIRUB SERPL-MCNC: 0.3 MG/DL — LOW (ref 0.4–2)
BLD GP AB SCN SERPL QL: SIGNIFICANT CHANGE UP
BLD GP AB SCN SERPL QL: SIGNIFICANT CHANGE UP
BUN SERPL-MCNC: 27.2 MG/DL — HIGH (ref 8–20)
BUN SERPL-MCNC: 27.2 MG/DL — HIGH (ref 8–20)
BUN SERPL-MCNC: 30.6 MG/DL — HIGH (ref 8–20)
BUN SERPL-MCNC: 30.6 MG/DL — HIGH (ref 8–20)
C3 SERPL-MCNC: 146 MG/DL — SIGNIFICANT CHANGE UP (ref 81–157)
C3 SERPL-MCNC: 146 MG/DL — SIGNIFICANT CHANGE UP (ref 81–157)
C4 SERPL-MCNC: 39 MG/DL — SIGNIFICANT CHANGE UP (ref 13–39)
C4 SERPL-MCNC: 39 MG/DL — SIGNIFICANT CHANGE UP (ref 13–39)
CALCIUM SERPL-MCNC: 8.6 MG/DL — SIGNIFICANT CHANGE UP (ref 8.4–10.5)
CALCIUM SERPL-MCNC: 8.8 MG/DL — SIGNIFICANT CHANGE UP (ref 8.4–10.5)
CALCIUM SERPL-MCNC: 8.8 MG/DL — SIGNIFICANT CHANGE UP (ref 8.4–10.5)
CHLORIDE SERPL-SCNC: 108 MMOL/L — SIGNIFICANT CHANGE UP (ref 96–108)
CHLORIDE SERPL-SCNC: 108 MMOL/L — SIGNIFICANT CHANGE UP (ref 96–108)
CHLORIDE SERPL-SCNC: 109 MMOL/L — HIGH (ref 96–108)
CHLORIDE SERPL-SCNC: 109 MMOL/L — HIGH (ref 96–108)
CO2 SERPL-SCNC: 16 MMOL/L — LOW (ref 22–29)
CO2 SERPL-SCNC: 16 MMOL/L — LOW (ref 22–29)
CO2 SERPL-SCNC: 21 MMOL/L — LOW (ref 22–29)
CO2 SERPL-SCNC: 21 MMOL/L — LOW (ref 22–29)
CREAT SERPL-MCNC: 3.6 MG/DL — HIGH (ref 0.5–1.3)
CREAT SERPL-MCNC: 3.6 MG/DL — HIGH (ref 0.5–1.3)
CREAT SERPL-MCNC: 3.91 MG/DL — HIGH (ref 0.5–1.3)
CREAT SERPL-MCNC: 3.91 MG/DL — HIGH (ref 0.5–1.3)
EGFR: 15 ML/MIN/1.73M2 — LOW
EGFR: 15 ML/MIN/1.73M2 — LOW
EGFR: 16 ML/MIN/1.73M2 — LOW
EGFR: 16 ML/MIN/1.73M2 — LOW
EOSINOPHIL # BLD AUTO: 0.21 K/UL — SIGNIFICANT CHANGE UP (ref 0–0.5)
EOSINOPHIL # BLD AUTO: 0.21 K/UL — SIGNIFICANT CHANGE UP (ref 0–0.5)
EOSINOPHIL NFR BLD AUTO: 3.1 % — SIGNIFICANT CHANGE UP (ref 0–6)
EOSINOPHIL NFR BLD AUTO: 3.1 % — SIGNIFICANT CHANGE UP (ref 0–6)
FERRITIN SERPL-MCNC: 77 NG/ML — SIGNIFICANT CHANGE UP (ref 15–150)
FERRITIN SERPL-MCNC: 77 NG/ML — SIGNIFICANT CHANGE UP (ref 15–150)
GLUCOSE BLDC GLUCOMTR-MCNC: 115 MG/DL — HIGH (ref 70–99)
GLUCOSE BLDC GLUCOMTR-MCNC: 115 MG/DL — HIGH (ref 70–99)
GLUCOSE SERPL-MCNC: 115 MG/DL — HIGH (ref 70–99)
GLUCOSE SERPL-MCNC: 115 MG/DL — HIGH (ref 70–99)
GLUCOSE SERPL-MCNC: 64 MG/DL — LOW (ref 70–99)
GLUCOSE SERPL-MCNC: 64 MG/DL — LOW (ref 70–99)
HCT VFR BLD CALC: 28.1 % — LOW (ref 34.5–45)
HCT VFR BLD CALC: 28.1 % — LOW (ref 34.5–45)
HGB BLD-MCNC: 8.9 G/DL — LOW (ref 11.5–15.5)
HGB BLD-MCNC: 8.9 G/DL — LOW (ref 11.5–15.5)
IMM GRANULOCYTES NFR BLD AUTO: 0.6 % — SIGNIFICANT CHANGE UP (ref 0–0.9)
IMM GRANULOCYTES NFR BLD AUTO: 0.6 % — SIGNIFICANT CHANGE UP (ref 0–0.9)
IRON SATN MFR SERPL: 16 UG/DL — LOW (ref 37–145)
IRON SATN MFR SERPL: 16 UG/DL — LOW (ref 37–145)
IRON SATN MFR SERPL: 7 % — LOW (ref 14–50)
IRON SATN MFR SERPL: 7 % — LOW (ref 14–50)
LYMPHOCYTES # BLD AUTO: 0.95 K/UL — LOW (ref 1–3.3)
LYMPHOCYTES # BLD AUTO: 0.95 K/UL — LOW (ref 1–3.3)
LYMPHOCYTES # BLD AUTO: 14 % — SIGNIFICANT CHANGE UP (ref 13–44)
LYMPHOCYTES # BLD AUTO: 14 % — SIGNIFICANT CHANGE UP (ref 13–44)
MAGNESIUM SERPL-MCNC: 2.1 MG/DL — SIGNIFICANT CHANGE UP (ref 1.6–2.6)
MAGNESIUM SERPL-MCNC: 2.1 MG/DL — SIGNIFICANT CHANGE UP (ref 1.6–2.6)
MCHC RBC-ENTMCNC: 27.6 PG — SIGNIFICANT CHANGE UP (ref 27–34)
MCHC RBC-ENTMCNC: 27.6 PG — SIGNIFICANT CHANGE UP (ref 27–34)
MCHC RBC-ENTMCNC: 31.7 GM/DL — LOW (ref 32–36)
MCHC RBC-ENTMCNC: 31.7 GM/DL — LOW (ref 32–36)
MCV RBC AUTO: 87 FL — SIGNIFICANT CHANGE UP (ref 80–100)
MCV RBC AUTO: 87 FL — SIGNIFICANT CHANGE UP (ref 80–100)
MONOCYTES # BLD AUTO: 0.15 K/UL — SIGNIFICANT CHANGE UP (ref 0–0.9)
MONOCYTES # BLD AUTO: 0.15 K/UL — SIGNIFICANT CHANGE UP (ref 0–0.9)
MONOCYTES NFR BLD AUTO: 2.2 % — SIGNIFICANT CHANGE UP (ref 2–14)
MONOCYTES NFR BLD AUTO: 2.2 % — SIGNIFICANT CHANGE UP (ref 2–14)
NEUTROPHILS # BLD AUTO: 5.39 K/UL — SIGNIFICANT CHANGE UP (ref 1.8–7.4)
NEUTROPHILS # BLD AUTO: 5.39 K/UL — SIGNIFICANT CHANGE UP (ref 1.8–7.4)
NEUTROPHILS NFR BLD AUTO: 79.7 % — HIGH (ref 43–77)
NEUTROPHILS NFR BLD AUTO: 79.7 % — HIGH (ref 43–77)
OSMOLALITY UR: 372 MOSM/KG — SIGNIFICANT CHANGE UP (ref 50–1200)
OSMOLALITY UR: 372 MOSM/KG — SIGNIFICANT CHANGE UP (ref 50–1200)
PHOSPHATE SERPL-MCNC: 3.8 MG/DL — SIGNIFICANT CHANGE UP (ref 2.4–4.7)
PHOSPHATE SERPL-MCNC: 3.8 MG/DL — SIGNIFICANT CHANGE UP (ref 2.4–4.7)
PLATELET # BLD AUTO: 232 K/UL — SIGNIFICANT CHANGE UP (ref 150–400)
PLATELET # BLD AUTO: 232 K/UL — SIGNIFICANT CHANGE UP (ref 150–400)
POTASSIUM SERPL-MCNC: 3.8 MMOL/L — SIGNIFICANT CHANGE UP (ref 3.5–5.3)
POTASSIUM SERPL-MCNC: 3.8 MMOL/L — SIGNIFICANT CHANGE UP (ref 3.5–5.3)
POTASSIUM SERPL-MCNC: 4 MMOL/L — SIGNIFICANT CHANGE UP (ref 3.5–5.3)
POTASSIUM SERPL-MCNC: 4 MMOL/L — SIGNIFICANT CHANGE UP (ref 3.5–5.3)
POTASSIUM SERPL-SCNC: 3.8 MMOL/L — SIGNIFICANT CHANGE UP (ref 3.5–5.3)
POTASSIUM SERPL-SCNC: 3.8 MMOL/L — SIGNIFICANT CHANGE UP (ref 3.5–5.3)
POTASSIUM SERPL-SCNC: 4 MMOL/L — SIGNIFICANT CHANGE UP (ref 3.5–5.3)
POTASSIUM SERPL-SCNC: 4 MMOL/L — SIGNIFICANT CHANGE UP (ref 3.5–5.3)
PROCALCITONIN SERPL-MCNC: 0.22 NG/ML — HIGH (ref 0.02–0.1)
PROCALCITONIN SERPL-MCNC: 0.22 NG/ML — HIGH (ref 0.02–0.1)
PROT SERPL-MCNC: 6.8 G/DL — SIGNIFICANT CHANGE UP (ref 6.6–8.7)
PROT SERPL-MCNC: 6.8 G/DL — SIGNIFICANT CHANGE UP (ref 6.6–8.7)
PROT SERPL-MCNC: 7.1 G/DL — SIGNIFICANT CHANGE UP (ref 6.6–8.7)
PROT SERPL-MCNC: 7.1 G/DL — SIGNIFICANT CHANGE UP (ref 6.6–8.7)
PTH-INTACT FLD-MCNC: 73 PG/ML — HIGH (ref 15–65)
PTH-INTACT FLD-MCNC: 73 PG/ML — HIGH (ref 15–65)
RBC # BLD: 3.12 M/UL — LOW (ref 3.8–5.2)
RBC # BLD: 3.12 M/UL — LOW (ref 3.8–5.2)
RBC # BLD: 3.23 M/UL — LOW (ref 3.8–5.2)
RBC # BLD: 3.23 M/UL — LOW (ref 3.8–5.2)
RBC # FLD: 15.3 % — HIGH (ref 10.3–14.5)
RBC # FLD: 15.3 % — HIGH (ref 10.3–14.5)
RETICS #: 46.5 K/UL — SIGNIFICANT CHANGE UP (ref 25–125)
RETICS #: 46.5 K/UL — SIGNIFICANT CHANGE UP (ref 25–125)
RETICS/RBC NFR: 1.5 % — SIGNIFICANT CHANGE UP (ref 0.5–2.5)
RETICS/RBC NFR: 1.5 % — SIGNIFICANT CHANGE UP (ref 0.5–2.5)
SODIUM SERPL-SCNC: 142 MMOL/L — SIGNIFICANT CHANGE UP (ref 135–145)
SODIUM SERPL-SCNC: 142 MMOL/L — SIGNIFICANT CHANGE UP (ref 135–145)
SODIUM SERPL-SCNC: 144 MMOL/L — SIGNIFICANT CHANGE UP (ref 135–145)
SODIUM SERPL-SCNC: 144 MMOL/L — SIGNIFICANT CHANGE UP (ref 135–145)
TIBC SERPL-MCNC: 243 UG/DL — SIGNIFICANT CHANGE UP (ref 220–430)
TIBC SERPL-MCNC: 243 UG/DL — SIGNIFICANT CHANGE UP (ref 220–430)
TOTAL HEM COMP BLD-ACNC: 62 U/ML — SIGNIFICANT CHANGE UP (ref 42–95)
TOTAL HEM COMP BLD-ACNC: 62 U/ML — SIGNIFICANT CHANGE UP (ref 42–95)
TRANSFERRIN SERPL-MCNC: 170 MG/DL — LOW (ref 192–382)
TRANSFERRIN SERPL-MCNC: 170 MG/DL — LOW (ref 192–382)
WBC # BLD: 6.77 K/UL — SIGNIFICANT CHANGE UP (ref 3.8–10.5)
WBC # BLD: 6.77 K/UL — SIGNIFICANT CHANGE UP (ref 3.8–10.5)
WBC # FLD AUTO: 6.77 K/UL — SIGNIFICANT CHANGE UP (ref 3.8–10.5)
WBC # FLD AUTO: 6.77 K/UL — SIGNIFICANT CHANGE UP (ref 3.8–10.5)

## 2024-01-02 PROCEDURE — 99233 SBSQ HOSP IP/OBS HIGH 50: CPT

## 2024-01-02 PROCEDURE — 71045 X-RAY EXAM CHEST 1 VIEW: CPT | Mod: 26

## 2024-01-02 RX ORDER — ACETAMINOPHEN 500 MG
1000 TABLET ORAL ONCE
Refills: 0 | Status: DISCONTINUED | OUTPATIENT
Start: 2024-01-02 | End: 2024-01-12

## 2024-01-02 RX ORDER — SODIUM CHLORIDE 9 MG/ML
1000 INJECTION, SOLUTION INTRAVENOUS
Refills: 0 | Status: DISCONTINUED | OUTPATIENT
Start: 2024-01-02 | End: 2024-01-03

## 2024-01-02 RX ORDER — PIPERACILLIN AND TAZOBACTAM 4; .5 G/20ML; G/20ML
3.38 INJECTION, POWDER, LYOPHILIZED, FOR SOLUTION INTRAVENOUS ONCE
Refills: 0 | Status: COMPLETED | OUTPATIENT
Start: 2024-01-02 | End: 2024-01-02

## 2024-01-02 RX ORDER — SODIUM CHLORIDE 9 MG/ML
1000 INJECTION INTRAMUSCULAR; INTRAVENOUS; SUBCUTANEOUS ONCE
Refills: 0 | Status: COMPLETED | OUTPATIENT
Start: 2024-01-02 | End: 2024-01-02

## 2024-01-02 RX ORDER — IRON SUCROSE 20 MG/ML
200 INJECTION, SOLUTION INTRAVENOUS EVERY 24 HOURS
Refills: 0 | Status: COMPLETED | OUTPATIENT
Start: 2024-01-02 | End: 2024-01-06

## 2024-01-02 RX ORDER — PIPERACILLIN AND TAZOBACTAM 4; .5 G/20ML; G/20ML
3.38 INJECTION, POWDER, LYOPHILIZED, FOR SOLUTION INTRAVENOUS EVERY 12 HOURS
Refills: 0 | Status: DISCONTINUED | OUTPATIENT
Start: 2024-01-02 | End: 2024-01-06

## 2024-01-02 RX ORDER — SODIUM BICARBONATE 1 MEQ/ML
650 SYRINGE (ML) INTRAVENOUS EVERY 8 HOURS
Refills: 0 | Status: DISCONTINUED | OUTPATIENT
Start: 2024-01-02 | End: 2024-01-03

## 2024-01-02 RX ORDER — SODIUM CHLORIDE 9 MG/ML
1000 INJECTION, SOLUTION INTRAVENOUS ONCE
Refills: 0 | Status: DISCONTINUED | OUTPATIENT
Start: 2024-01-02 | End: 2024-01-02

## 2024-01-02 RX ORDER — VANCOMYCIN HCL 1 G
1000 VIAL (EA) INTRAVENOUS ONCE
Refills: 0 | Status: COMPLETED | OUTPATIENT
Start: 2024-01-02 | End: 2024-01-02

## 2024-01-02 RX ORDER — SODIUM CHLORIDE 9 MG/ML
750 INJECTION INTRAMUSCULAR; INTRAVENOUS; SUBCUTANEOUS ONCE
Refills: 0 | Status: COMPLETED | OUTPATIENT
Start: 2024-01-02 | End: 2024-01-02

## 2024-01-02 RX ORDER — ACETAMINOPHEN 500 MG
1000 TABLET ORAL ONCE
Refills: 0 | Status: COMPLETED | OUTPATIENT
Start: 2024-01-02 | End: 2024-01-02

## 2024-01-02 RX ADMIN — Medication 650 MILLIGRAM(S): at 22:41

## 2024-01-02 RX ADMIN — Medication 650 MILLIGRAM(S): at 14:07

## 2024-01-02 RX ADMIN — SODIUM CHLORIDE 1000 MILLILITER(S): 9 INJECTION INTRAMUSCULAR; INTRAVENOUS; SUBCUTANEOUS at 22:43

## 2024-01-02 RX ADMIN — SODIUM CHLORIDE 3 MILLILITER(S): 9 INJECTION INTRAMUSCULAR; INTRAVENOUS; SUBCUTANEOUS at 13:35

## 2024-01-02 RX ADMIN — ENOXAPARIN SODIUM 30 MILLIGRAM(S): 100 INJECTION SUBCUTANEOUS at 05:17

## 2024-01-02 RX ADMIN — SODIUM CHLORIDE 3 MILLILITER(S): 9 INJECTION INTRAMUSCULAR; INTRAVENOUS; SUBCUTANEOUS at 05:18

## 2024-01-02 RX ADMIN — Medication 650 MILLIGRAM(S): at 09:11

## 2024-01-02 RX ADMIN — Medication 650 MILLIGRAM(S): at 08:11

## 2024-01-02 RX ADMIN — IRON SUCROSE 110 MILLIGRAM(S): 20 INJECTION, SOLUTION INTRAVENOUS at 12:05

## 2024-01-02 RX ADMIN — Medication 1 MILLIGRAM(S): at 20:38

## 2024-01-02 RX ADMIN — Medication 400 MILLIGRAM(S): at 22:40

## 2024-01-02 RX ADMIN — Medication 1 TABLET(S): at 12:06

## 2024-01-02 RX ADMIN — Medication 1 MILLIGRAM(S): at 12:06

## 2024-01-02 RX ADMIN — SODIUM CHLORIDE 125 MILLILITER(S): 9 INJECTION, SOLUTION INTRAVENOUS at 17:12

## 2024-01-02 RX ADMIN — SODIUM CHLORIDE 100 MILLILITER(S): 9 INJECTION, SOLUTION INTRAVENOUS at 10:17

## 2024-01-02 RX ADMIN — Medication 1000 MILLIGRAM(S): at 22:55

## 2024-01-02 RX ADMIN — PIPERACILLIN AND TAZOBACTAM 200 GRAM(S): 4; .5 INJECTION, POWDER, LYOPHILIZED, FOR SOLUTION INTRAVENOUS at 22:57

## 2024-01-02 RX ADMIN — SODIUM CHLORIDE 3 MILLILITER(S): 9 INJECTION INTRAMUSCULAR; INTRAVENOUS; SUBCUTANEOUS at 22:36

## 2024-01-02 NOTE — PROGRESS NOTE ADULT - SUBJECTIVE AND OBJECTIVE BOX
Acute renal failure    HPI:  24 y/o female whose real name is Trinh Clark. History obtained from 2 Hoahaoism friends at bedside who arrived after Patient brought from field to ED for c/o acting strange at a Hoahaoism event. Language line not working at this time, History obtained from ED chart and 2 friends who know patient peripherally. Patient was apparently in her normal state of health at a Hoahaoism event and shortly after eating rice/beans and meat started c/o abdominal pain and cramping and then vomited. Friends who witnessed it said they called EMS due to patient being very agitated. No one else reportedly was sick from eating the same food. No recent illnesses, travel, or sick contacts reported. Patient has never used illicit drugs and does not use alcohol. She has a hx of "kidney problems" and used to be on medications for it but self discontinued " a while ago." In th field Patient given Versed and then Ativan in ED to allow for exam and labs. Vitals with tachycardia/tachypnea on arrival due to agitation. ED attending states patient denying any trauma, or assault. No obvious injury on exam. Labs with Hbg of 8.6 and Cr. of 3.95 with reported baseline of 2.5. No reported mental health history.     : Tita Clark [phone] answer at this time  (01 Jan 2024 05:57)    Interval History:  Patient was seen and examined at bedside around 11:30 am with  - [place].  Has some headache and body ache otherwise feels OK.    She knows that she is in the hospital.   Denies chest pain, palpitations, shortness of breath, dizziness, visual symptoms, nausea, vomiting or abdominal pain.    ROS:  As per interval history otherwise unremarkable.    PHYSICAL EXAM:  Vital Signs   T(C): 36.8 (02 Jan 2024 10:26), Max: 36.8 (02 Jan 2024 01:02)  T(F): 98.3 (02 Jan 2024 10:26), Max: 98.3 (02 Jan 2024 10:26)  HR: 94 (02 Jan 2024 10:26) (81 - 94)  BP: 99/64 (02 Jan 2024 10:26) (89/56 - 99/64)  BP(mean): 75 (02 Jan 2024 10:26) (75 - 75)  RR: 18 (02 Jan 2024 10:26) (18 - 18)  SpO2: 95% (02 Jan 2024 10:26) (95% - 100%)  Parameters below as of 02 Jan 2024 10:26  Patient On (Oxygen Delivery Method): room air  General: Young female sitting in bed comfortably. No acute distress  HEENT: EOMI. Clear conjunctivae. Moist mucus membrane  Neck: Supple.   Chest: CTA bilaterally. No wheezing, rales or rhonchi.   Heart: Normal S1 & S2. RRR.   Abdomen: Non distended. Soft. Non-tender. + BS  Ext: No pedal edema. No calf tenderness   Neuro: AAO x 3. No focal deficit. No speech disorder  Skin: Warm and Dry  Psychiatry: Normal mood and affect    LABS:                      8.6    5.52  )-----------( 257      ( 01 Jan 2024 03:50 )             26.9     01-02    142  |  108  |  27.2<H>  ----------------------------<  64<L>  3.8   |  16.0<L>  |  3.60<H>    Ca    8.6      02 Jan 2024 02:49  Phos  3.8     01-02  Mg     2.1     01-02    TPro  7.1  /  Alb  3.7  /  TBili  0.3<L>  /  DBili  x   /  AST  13  /  ALT  7   /  AlkPhos  58  01-02    Urinalysis Basic - ( 02 Jan 2024 02:49 )    Color: x / Appearance: x / SG: x / pH: x  Gluc: 64 mg/dL / Ketone: x  / Bili: x / Urobili: x   Blood: x / Protein: x / Nitrite: x   Leuk Esterase: x / RBC: x / WBC x   Sq Epi: x / Non Sq Epi: x / Bacteria: x    RADIOLOGY & ADDITIONAL STUDIES:  Reviewed     MEDICATIONS  (STANDING):  enoxaparin Injectable 30 milliGRAM(s) SubCutaneous every 24 hours  folic acid 1 milliGRAM(s) Oral daily  iron sucrose IVPB 200 milliGRAM(s) IV Intermittent every 24 hours  multivitamin 1 Tablet(s) Oral daily  sodium bicarbonate 650 milliGRAM(s) Oral every 8 hours  sodium chloride 0.45% 1000 milliLiter(s) (125 mL/Hr) IV Continuous <Continuous>  sodium chloride 0.9% lock flush 3 milliLiter(s) IV Push every 8 hours    MEDICATIONS  (PRN):  acetaminophen     Tablet .. 650 milliGRAM(s) Oral every 6 hours PRN Temp greater or equal to 38C (100.4F), Mild Pain (1 - 3)  aluminum hydroxide/magnesium hydroxide/simethicone Suspension 30 milliLiter(s) Oral every 4 hours PRN Dyspepsia  LORazepam   Injectable 1 milliGRAM(s) IV Push every 8 hours PRN Agitation  melatonin 3 milliGRAM(s) Oral at bedtime PRN Insomnia  ondansetron Injectable 4 milliGRAM(s) IV Push every 8 hours PRN Nausea and/or Vomiting       Acute renal failure    HPI:  22 y/o female whose real name is Trinh Clark. History obtained from 2 Sikh friends at bedside who arrived after Patient brought from field to ED for c/o acting strange at a Sikh event. Language line not working at this time, History obtained from ED chart and 2 friends who know patient peripherally. Patient was apparently in her normal state of health at a Sikh event and shortly after eating rice/beans and meat started c/o abdominal pain and cramping and then vomited. Friends who witnessed it said they called EMS due to patient being very agitated. No one else reportedly was sick from eating the same food. No recent illnesses, travel, or sick contacts reported. Patient has never used illicit drugs and does not use alcohol. She has a hx of "kidney problems" and used to be on medications for it but self discontinued " a while ago." In th field Patient given Versed and then Ativan in ED to allow for exam and labs. Vitals with tachycardia/tachypnea on arrival due to agitation. ED attending states patient denying any trauma, or assault. No obvious injury on exam. Labs with Hbg of 8.6 and Cr. of 3.95 with reported baseline of 2.5. No reported mental health history.     : Tita Clark [phone] answer at this time  (01 Jan 2024 05:57)    Interval History:  Patient was seen and examined at bedside around 11:30 am with  - [place].  Has some headache and body ache otherwise feels OK.    She knows that she is in the hospital.   Denies chest pain, palpitations, shortness of breath, dizziness, visual symptoms, nausea, vomiting or abdominal pain.    ROS:  As per interval history otherwise unremarkable.    PHYSICAL EXAM:  Vital Signs   T(C): 36.8 (02 Jan 2024 10:26), Max: 36.8 (02 Jan 2024 01:02)  T(F): 98.3 (02 Jan 2024 10:26), Max: 98.3 (02 Jan 2024 10:26)  HR: 94 (02 Jan 2024 10:26) (81 - 94)  BP: 99/64 (02 Jan 2024 10:26) (89/56 - 99/64)  BP(mean): 75 (02 Jan 2024 10:26) (75 - 75)  RR: 18 (02 Jan 2024 10:26) (18 - 18)  SpO2: 95% (02 Jan 2024 10:26) (95% - 100%)  Parameters below as of 02 Jan 2024 10:26  Patient On (Oxygen Delivery Method): room air  General: Young female sitting in bed comfortably. No acute distress  HEENT: EOMI. Clear conjunctivae. Moist mucus membrane  Neck: Supple.   Chest: CTA bilaterally. No wheezing, rales or rhonchi.   Heart: Normal S1 & S2. RRR.   Abdomen: Non distended. Soft. Non-tender. + BS  Ext: No pedal edema. No calf tenderness   Neuro: AAO x 3. No focal deficit. No speech disorder  Skin: Warm and Dry  Psychiatry: Normal mood and affect    LABS:                      8.6    5.52  )-----------( 257      ( 01 Jan 2024 03:50 )             26.9     01-02    142  |  108  |  27.2<H>  ----------------------------<  64<L>  3.8   |  16.0<L>  |  3.60<H>    Ca    8.6      02 Jan 2024 02:49  Phos  3.8     01-02  Mg     2.1     01-02    TPro  7.1  /  Alb  3.7  /  TBili  0.3<L>  /  DBili  x   /  AST  13  /  ALT  7   /  AlkPhos  58  01-02    Urinalysis Basic - ( 02 Jan 2024 02:49 )    Color: x / Appearance: x / SG: x / pH: x  Gluc: 64 mg/dL / Ketone: x  / Bili: x / Urobili: x   Blood: x / Protein: x / Nitrite: x   Leuk Esterase: x / RBC: x / WBC x   Sq Epi: x / Non Sq Epi: x / Bacteria: x    RADIOLOGY & ADDITIONAL STUDIES:  Reviewed     MEDICATIONS  (STANDING):  enoxaparin Injectable 30 milliGRAM(s) SubCutaneous every 24 hours  folic acid 1 milliGRAM(s) Oral daily  iron sucrose IVPB 200 milliGRAM(s) IV Intermittent every 24 hours  multivitamin 1 Tablet(s) Oral daily  sodium bicarbonate 650 milliGRAM(s) Oral every 8 hours  sodium chloride 0.45% 1000 milliLiter(s) (125 mL/Hr) IV Continuous <Continuous>  sodium chloride 0.9% lock flush 3 milliLiter(s) IV Push every 8 hours    MEDICATIONS  (PRN):  acetaminophen     Tablet .. 650 milliGRAM(s) Oral every 6 hours PRN Temp greater or equal to 38C (100.4F), Mild Pain (1 - 3)  aluminum hydroxide/magnesium hydroxide/simethicone Suspension 30 milliLiter(s) Oral every 4 hours PRN Dyspepsia  LORazepam   Injectable 1 milliGRAM(s) IV Push every 8 hours PRN Agitation  melatonin 3 milliGRAM(s) Oral at bedtime PRN Insomnia  ondansetron Injectable 4 milliGRAM(s) IV Push every 8 hours PRN Nausea and/or Vomiting

## 2024-01-02 NOTE — PROGRESS NOTE ADULT - ASSESSMENT
24 y/o female whose real name is Trinh Clark. History obtained from 2 Hinduism friends at bedside who arrived after Patient brought from field to ED for c/o acting strange at a Hinduism event. Language line not working at this time, History obtained from ED chart and 2 friends who know patient peripherally. Patient was apparently in her normal state of health at a Hinduism event and shortly after eating rice/beans and meat started c/o abdominal pain and cramping and then vomited. Friends who witnessed it said they called EMS due to patient being very agitated. No one else reportedly was sick from eating the same food. No recent illnesses, travel, or sick contacts reported. Patient has never used illicit drugs and does not use alcohol. She has a hx of "kidney problems" and used to be on medications for it but self discontinued " a while ago." In th field Patient given Versed and then Ativan in ED to allow for exam and labs. Vitals with tachycardia/tachypnea on arrival due to agitation. ED attending states patient denying any trauma, or assault. No obvious injury on exam. Labs with Hbg of 8.6 and Cr. of 3.95 with reported baseline of 2.5.    MARIVEL on CKD suspect stage III/ IV  Serum Cr 4.3--> 3.6  Improving w IVF hydration will cont  C3 C4 are nL  Albumin nL    CT abd noted--> Atrophic kidneys with bilateral indeterminate renal masses    MA likely 2/2 MARIVEL cont iVF w added bicarb inc rate    Anemia very low iron stores agree w IV iron    Will follow 22 y/o female whose real name is Trinh Clark. History obtained from 2 Christian friends at bedside who arrived after Patient brought from field to ED for c/o acting strange at a Christian event. Language line not working at this time, History obtained from ED chart and 2 friends who know patient peripherally. Patient was apparently in her normal state of health at a Christian event and shortly after eating rice/beans and meat started c/o abdominal pain and cramping and then vomited. Friends who witnessed it said they called EMS due to patient being very agitated. No one else reportedly was sick from eating the same food. No recent illnesses, travel, or sick contacts reported. Patient has never used illicit drugs and does not use alcohol. She has a hx of "kidney problems" and used to be on medications for it but self discontinued " a while ago." In th field Patient given Versed and then Ativan in ED to allow for exam and labs. Vitals with tachycardia/tachypnea on arrival due to agitation. ED attending states patient denying any trauma, or assault. No obvious injury on exam. Labs with Hbg of 8.6 and Cr. of 3.95 with reported baseline of 2.5.    MARIVEL on CKD suspect stage III/ IV  Serum Cr 4.3--> 3.6  Improving w IVF hydration will cont  C3 C4 are nL  Albumin nL    CT abd noted--> Atrophic kidneys with bilateral indeterminate renal masses    MA likely 2/2 MARIVEL cont iVF w added bicarb inc rate    Anemia very low iron stores agree w IV iron    Will follow

## 2024-01-02 NOTE — CHART NOTE - NSCHARTNOTEFT_GEN_A_CORE
Code sepsis called for rctal temp 105 and   Pt admitted for AMS and MARIVEL  Pt without complaints at this time  VSS B/P: 81/41    P 129    R  18   PO 95%  T  105 rectal  Accucheck 115  Pt awake alert  Breathing easy and unlabored  Capillary refill < 2 seconds   Skin exam warm and dry  Peripheral pulses 2+ Bilaterally  Heart tachy, regular  Lungs clear bilaterally  Abd +BS soft and nontender  ext neg calf tenderness  neuro CN 2-12 grossly intact  Sepsis workup initiated  STAT LABS ORDERED:   - cbc w/diff  - cmp  - blood cultures x2  - lactate  - repeat lactate in 4 hrs if elevated  - PT/PTT/INR  - UA  - Urine Culture  -procalcitonin   RVP  NS 1750-cc bolus X 1  CXR  Zosyn 3.375 mg IVPB X1  Vanco 1000 mg IVPB x 1  PLAN OF CARE  - abx + fluids as above  - VS q30min x 1hr, then q1hr x 6hrs  - FU labs + imaging  - 2hr bedside follow up planned  - Remain at current level of care  Antipyretics, IV fluids and empiric antibiotics were promptly initiated and sepsis workup sent  Plan to discontinue Fluids if lactate < 2 given pt hemodynamically stable  Pt Hemodynamically stable  Continue to monitor  Call PA if any changes in pts condition. Code sepsis called for rctal temp 105 and   Pt admitted for AMS and MARIVEL  Head CT on admission with no acute findings  No nuchal rigidity  skin no rashes  no photophobia  Pt without complaints at this time  VSS B/P: 81/41    P 129    R  18   PO 95%  T  105 rectal  Accucheck 115  Pt awake alert  Breathing easy and unlabored  Capillary refill < 2 seconds   Skin exam warm and dry  Peripheral pulses 2+ Bilaterally  Heart tachy, regular  Lungs clear bilaterally  Abd +BS soft and nontender  ext neg calf tenderness  neuro CN 2-12 grossly intact  Sepsis workup initiated  STAT LABS ORDERED:   - cbc w/diff  - cmp  - blood cultures x2  - lactate  - repeat lactate in 4 hrs if elevated  - PT/PTT/INR  - UA  - Urine Culture  -procalcitonin   RVP  NS 1750-cc bolus X 1  CXR  Zosyn 3.375 mg IVPB X1  Vanco 1000 mg IVPB x 1  [employer]  [place]  Dr Flores at bedside  PLAN OF CARE  - abx + fluids as above  - VS q30min x 1hr, then q1hr x 6hrs  - FU labs + imaging  - 2hr bedside follow up planned  - Remain at current level of care  Antipyretics, IV fluids and empiric antibiotics were promptly initiated and sepsis workup sent  Plan to discontinue Fluids if lactate < 2 given pt hemodynamically stable  Pt Hemodynamically stable  Continue to monitor  Call PA if any changes in pts condition. Code sepsis called for rctal temp 105 and   Pt admitted for AMS and MARIVEL  Head CT on admission with no acute findings  No nuchal rigidity  skin no rashes  no photophobia  Pt without complaints at this time  VSS B/P: 81/41    P 129    R  18   PO 95%  T  105 rectal  Accucheck 115  Pt awake alert  Breathing easy and unlabored  Capillary refill < 2 seconds   Skin exam warm and dry  Peripheral pulses 2+ Bilaterally  Heart tachy, regular  Lungs clear bilaterally  Abd +BS soft and nontender  ext neg calf tenderness  neuro CN 2-12 grossly intact  Sepsis workup initiated  STAT LABS ORDERED:   - cbc w/diff  - cmp  - blood cultures x2  - lactate  - repeat lactate in 4 hrs if elevated  - PT/PTT/INR  - UA  - Urine Culture  -procalcitonin   RVP  NS 1750-cc bolus X 1  CXR  Zosyn 3.375 mg IVPB X1  Vanco 1000 mg IVPB x 1  [employer]  cooling [place]  Dr Flores at bedside  PLAN OF CARE  - abx + fluids as above  - VS q30min x 1hr, then q1hr x 6hrs  - FU labs + imaging  - 2hr bedside follow up planned  - Remain at current level of care  Antipyretics, IV fluids and empiric antibiotics were promptly initiated and sepsis workup sent  Plan to discontinue Fluids if lactate < 2 given pt hemodynamically stable  Pt Hemodynamically stable  Continue to monitor  Call PA if any changes in pts condition. Code sepsis called for rctal temp 105 and   Pt admitted for AMS and MARIVEL  Head CT on admission with no acute findings  No nuchal rigidity  skin no rashes  no photophobia  Pt without complaints at this time  VSS B/P: 81/41    P 129    R  18   PO 95%  T  105 rectal  Accucheck 115  Pt awake alert  Breathing easy and unlabored  Capillary refill < 2 seconds   Skin exam warm and dry  Peripheral pulses 2+ Bilaterally  Heart tachy, regular  Lungs clear bilaterally  Abd +BS soft and nontender  ext neg calf tenderness  neuro CN 2-12 grossly intact  Sepsis workup initiated  STAT LABS ORDERED:   - cbc w/diff  - cmp  - blood cultures x2  - lactate  - repeat lactate in 4 hrs if elevated  - PT/PTT/INR  - UA  - Urine Culture  -procalcitonin   RVP  NS 1750-cc bolus X 1  CXR  Zosyn 3.375 mg IVPB X1  Vanco 1000 mg IVPB x 1  [employer]  cool[place]  Dr Flores at bedside  Consider ID consult if RVP neg  PLAN OF CARE  - abx + fluids as above  - VS q30min x 1hr, then q1hr x 6hrs  - FU labs + imaging  - 2hr bedside follow up planned  - Remain at current level of care  Antipyretics, IV fluids and empiric antibiotics were promptly initiated and sepsis workup sent  Plan to discontinue Fluids if lactate < 2 given pt hemodynamically stable  Pt Hemodynamically stable  Continue to monitor  Call PA if any changes in pts condition. Code sepsis called for rctal temp 105 and   Pt admitted for AMS and MARIVEL  Head CT on admission with no acute findings  No nuchal rigidity  skin no rashes  no photophobia  Pt without complaints at this time  VSS B/P: 81/41    P 129    R  18   PO 95%  T  105 rectal  Accucheck 115  Pt awake alert  Breathing easy and unlabored  Capillary refill < 2 seconds   Skin exam warm and dry  Peripheral pulses 2+ Bilaterally  Heart tachy, regular  Lungs clear bilaterally  Abd +BS soft and nontender  ext neg calf tenderness  neuro CN 2-12 grossly intact  Sepsis workup initiated  STAT LABS ORDERED:   - cbc w/diff  - cmp  - blood cultures x2  - lactate  - repeat lactate in 4 hrs if elevated  - PT/PTT/INR  - UA  - Urine Culture  -procalcitonin   RVP  NS 1750-cc bolus X 1  CXR  Zosyn 3.375 mg IVPB X1  Vanco 1000 mg IVPB x 1  [employer]  cooling [place]  Dr Flores at bedside  Consider ID consult if RVP neg  PLAN OF CARE  - abx + fluids as above  - VS q30min x 1hr, then q1hr x 6hrs  - FU labs + imaging  - 2hr bedside follow up planned  - Remain at current level of care  Antipyretics, IV fluids and empiric antibiotics were promptly initiated and sepsis workup sent  Plan to discontinue Fluids if lactate < 2 given pt hemodynamically stable  Pt Hemodynamically stable  Continue to monitor  Call PA if any changes in pts condition. Code sepsis called for rctal temp 105 and   Pt admitted for AMS and MARIVEL  Head CT on admission with no acute findings  No nuchal rigidity  skin no rashes  no photophobia  c/o slight headache, no other complaints at this time  VSS B/P: 81/41    P 129    R  18   PO 95%  T  105 rectal  Accucheck 115  Pt awake alert  Breathing easy and unlabored  Capillary refill < 2 seconds   Skin exam warm and dry  Peripheral pulses 2+ Bilaterally  Heart tachy, regular  Lungs clear bilaterally  Abd +BS soft and nontender  ext neg calf tenderness  neuro CN 2-12 grossly intact  Sepsis workup initiated  STAT LABS ORDERED:   - cbc w/diff  - cmp  - blood cultures x2  - lactate  - repeat lactate in 4 hrs if elevated  - PT/PTT/INR  - UA  - Urine Culture  -procalcitonin   RVP  NS 1750-cc bolus X 1  CXR  Zosyn 3.375 mg IVPB X1  Vanco 1000 mg IVPB x 1  [employer]  cooling [place]  Dr Flores at bedside  Consider ID consult if RVP neg  PLAN OF CARE  - abx + fluids as above  - VS q30min x 1hr, then q1hr x 6hrs  - FU labs + imaging  - 2hr bedside follow up planned  - Remain at current level of care  Antipyretics, IV fluids and empiric antibiotics were promptly initiated and sepsis workup sent  Plan to discontinue Fluids if lactate < 2 given pt hemodynamically stable  Pt Hemodynamically stable  Continue to monitor  Call PA if any changes in pts condition. Code sepsis called for rctal temp 105 and   Pt admitted for AMS and MARIVEL  Head CT on admission with no acute findings  No nuchal rigidity  skin no rashes  no photophobia  c/o slight headache, no other complaints at this time  VSS B/P: 81/41    P 129    R  18   PO 95%  T  105 rectal  Accucheck 115  Pt awake alert  Breathing easy and unlabored  Capillary refill < 2 seconds   Skin exam warm and dry  Peripheral pulses 2+ Bilaterally  Heart tachy, regular  Lungs clear bilaterally  Abd +BS soft and nontender  ext neg calf tenderness  neuro CN 2-12 grossly intact  Sepsis workup initiated  STAT LABS ORDERED:   - cbc w/diff  - cmp  - blood cultures x2  - lactate  - repeat lactate in 4 hrs if elevated  - PT/PTT/INR  - UA  - Urine Culture  -procalcitonin   RVP  NS 1750-cc bolus X 1  CXR  Zosyn 3.375 mg IVPB X1  Vanco 1000 mg IVPB x 1  [employer]  cool[place]  Dr Flores at bedside  Consider ID consult if RVP neg  PLAN OF CARE  - abx + fluids as above  - VS q30min x 1hr, then q1hr x 6hrs  - FU labs + imaging  - 2hr bedside follow up planned  - Remain at current level of care  Antipyretics, IV fluids and empiric antibiotics were promptly initiated and sepsis workup sent  Plan to discontinue Fluids if lactate < 2 given pt hemodynamically stable  Pt Hemodynamically stable  Continue to monitor  Call PA if any changes in pts condition.

## 2024-01-02 NOTE — PROGRESS NOTE ADULT - SUBJECTIVE AND OBJECTIVE BOX
NEPHROLOGY INTERVAL HPI/OVERNIGHT EVENTS:    Examined  Feeling better  Denies HA CP no SOB    MEDICATIONS  (STANDING):  enoxaparin Injectable 30 milliGRAM(s) SubCutaneous every 24 hours  folic acid 1 milliGRAM(s) Oral daily  iron sucrose IVPB 200 milliGRAM(s) IV Intermittent every 24 hours  multivitamin 1 Tablet(s) Oral daily  sodium bicarbonate 650 milliGRAM(s) Oral every 8 hours  sodium chloride 0.45% 1000 milliLiter(s) (125 mL/Hr) IV Continuous <Continuous>  sodium chloride 0.9% lock flush 3 milliLiter(s) IV Push every 8 hours    MEDICATIONS  (PRN):  acetaminophen     Tablet .. 650 milliGRAM(s) Oral every 6 hours PRN Temp greater or equal to 38C (100.4F), Mild Pain (1 - 3)  aluminum hydroxide/magnesium hydroxide/simethicone Suspension 30 milliLiter(s) Oral every 4 hours PRN Dyspepsia  LORazepam   Injectable 1 milliGRAM(s) IV Push every 8 hours PRN Agitation  melatonin 3 milliGRAM(s) Oral at bedtime PRN Insomnia  ondansetron Injectable 4 milliGRAM(s) IV Push every 8 hours PRN Nausea and/or Vomiting      Allergies    No Known Allergies    Intolerances        Vital Signs Last 24 Hrs  T(C): 36.8 (02 Jan 2024 10:26), Max: 36.8 (02 Jan 2024 01:02)  T(F): 98.3 (02 Jan 2024 10:26), Max: 98.3 (02 Jan 2024 10:26)  HR: 94 (02 Jan 2024 10:26) (81 - 94)  BP: 99/64 (02 Jan 2024 10:26) (89/56 - 105/58)  BP(mean): 75 (02 Jan 2024 10:26) (75 - 75)  RR: 18 (02 Jan 2024 10:26) (18 - 18)  SpO2: 95% (02 Jan 2024 10:26) (95% - 100%)    Parameters below as of 02 Jan 2024 10:26  Patient On (Oxygen Delivery Method): room air      PHYSICAL EXAM:    GENERAL: NAD  HEAD: NCAT  EYES: EOMI  NECK: Supple  NERVOUS SYSTEM:  Alert & Oriented X3  CHEST/LUNG: Clear to percussion bilaterally  HEART: Regular rate and rhythm  ABDOMEN: Soft, Nontender, Nondistended; +BS  EXTREMITIES: no edema    LABS:                        8.6    5.52  )-----------( 257      ( 01 Jan 2024 03:50 )             26.9     01-02    142  |  108  |  27.2<H>  ----------------------------<  64<L>  3.8   |  16.0<L>  |  3.60<H>    Ca    8.6      02 Jan 2024 02:49  Phos  3.8     01-02  Mg     2.1     01-02    TPro  7.1  /  Alb  3.7  /  TBili  0.3<L>  /  DBili  x   /  AST  13  /  ALT  7   /  AlkPhos  58  01-02      Urinalysis Basic - ( 02 Jan 2024 02:49 )    Color: x / Appearance: x / SG: x / pH: x  Gluc: 64 mg/dL / Ketone: x  / Bili: x / Urobili: x   Blood: x / Protein: x / Nitrite: x   Leuk Esterase: x / RBC: x / WBC x   Sq Epi: x / Non Sq Epi: x / Bacteria: x      Magnesium: 2.1 mg/dL (01-02 @ 02:49)  Phosphorus: 3.8 mg/dL (01-02 @ 02:49)  Intact PTH: 73 pg/mL (01-02 @ 02:49)  C3 Complement, Serum: 146 mg/dL (01-02 @ 02:49)  C4 Complement, Serum: 39 mg/dL (01-02 @ 02:49)          RADIOLOGY & ADDITIONAL TESTS:

## 2024-01-02 NOTE — PATIENT PROFILE ADULT - FALL HARM RISK - HARM RISK INTERVENTIONS
Communicate Risk of Fall with Harm to all staff/Reinforce activity limits and safety measures with patient and family/Tailored Fall Risk Interventions/Visual Cue: Yellow wristband and red socks/Bed in lowest position, wheels locked, appropriate side rails in place/Call bell, personal items and telephone in reach/Instruct patient to call for assistance before getting out of bed or chair/Non-slip footwear when patient is out of bed/[place] to call system/Physically safe environment - no spills, clutter or unnecessary equipment/Purposeful Proactive Rounding/Room/bathroom lighting operational, light cord in reach Communicate Risk of Fall with Harm to all staff/Reinforce activity limits and safety measures with patient and family/Tailored Fall Risk Interventions/Visual Cue: Yellow wristband and red socks/Bed in lowest position, wheels locked, appropriate side rails in place/Call bell, personal items and telephone in reach/Instruct patient to call for assistance before getting out of bed or chair/Non-slip footwear when patient is out of bed/[place] to call system/Physically safe environment - no spills, clutter or unnecessary equipment/Purposeful Proactive Rounding/Room/bathroom lighting operational, light cord in reach

## 2024-01-02 NOTE — PROGRESS NOTE ADULT - ASSESSMENT
24 y/o female whose real name is Trinh Clark. brought from [facility] for evaluation.    Patient was apparently in her normal state of health at a [employer] event and shortly after eating rice/beans and meat started c/o abdominal pain and cramping and then vomited. Friends who witnessed it said they called EMS due to patient being very agitated. No one else reportedly was sick from eating the same food. No recent illnesses, travel, or sick contacts reported. Patient has never used illicit drugs and does not use alcohol. She has a hx of "kidney problems" and used to be on medications for it but self discontinued " a while ago." In th field Patient given Versed and then Ativan in ED to allow for exam and labs. ED attending states patient denying any trauma, or assault. No obvious injury on exam. Labs with Hbg of 8.6 and Cr. of 3.95 with reported baseline of 2.5. No reported mental health history.     24 y/o female with episode of agitation, MARIVEL on CKD? Anemia     Agitation:  -No prodrome, related to food ingestion by history  -Panic attack? Pain related?   -CTH neg  -Currently sedated but vitals stable, arouses to verbal/tactile stimuli  -Cont. Monitor  -fall risk  -NPO except meds for now  -Utox  -VC boots  -Await  to call back or come in for more accurate collateral information/Medical history     MARIVEL on CKD?  -Reported to ED attending baseline 2.5  -Check CPK   -Bicarb low likely due to CKD/lytes normal  -AG normal  -Check U/A  -IVF, trend BMP  -Nephrology called by ED  -US pending    Anemia:  -Likely on basis of CKD  -Check iron studies, stool for OB  -FA/MVI  -Repeat CBC in AM    INCOMPLETE 22 y/o female whose real name is Trinh Clark. brought from [facility] for evaluation.    Patient was apparently in her normal state of health at a [employer] event and shortly after eating rice/beans and meat started c/o abdominal pain and cramping and then vomited. Friends who witnessed it said they called EMS due to patient being very agitated. No one else reportedly was sick from eating the same food. No recent illnesses, travel, or sick contacts reported. Patient has never used illicit drugs and does not use alcohol. She has a hx of "kidney problems" and used to be on medications for it but self discontinued " a while ago." In th field Patient given Versed and then Ativan in ED to allow for exam and labs. ED attending states patient denying any trauma, or assault. No obvious injury on exam. Labs with Hbg of 8.6 and Cr. of 3.95 with reported baseline of 2.5. No reported mental health history.     22 y/o female with episode of agitation, MARIVEL on CKD? Anemia     Agitation:  -No prodrome, related to food ingestion by history  -Panic attack? Pain related?   -CTH neg  -Currently sedated but vitals stable, arouses to verbal/tactile stimuli  -Cont. Monitor  -fall risk  -NPO except meds for now  -Utox  -VC boots  -Await  to call back or come in for more accurate collateral information/Medical history     MARIVEL on CKD?  -Reported to ED attending baseline 2.5  -Check CPK   -Bicarb low likely due to CKD/lytes normal  -AG normal  -Check U/A  -IVF, trend BMP  -Nephrology called by ED  -US pending    Anemia:  -Likely on basis of CKD  -Check iron studies, stool for OB  -FA/MVI  -Repeat CBC in AM    INCOMPLETE 24 y/o female whose real name is Trinh Clark. brought from [facility] for evaluation.    Patient was apparently in her normal state of health at a [employer] event and shortly after eating rice/beans and meat started c/o abdominal pain and cramping and then vomited. Friends who witnessed it said they called EMS due to patient being very agitated. No one else reportedly was sick from eating the same food. No recent illnesses, travel, or sick contacts reported. Patient has never used illicit drugs and does not use alcohol. She has a hx of "kidney problems" and used to be on medications for it but self discontinued " a while ago." In th field Patient given Versed and then Ativan in ED to allow for exam and labs. ED attending states patient denying any trauma, or assault. No obvious injury on exam. Labs with Hbg of 8.6 and Cr. of 3.95 with reported baseline of 2.5. No reported mental health history.     AMS / Agitation  -No prodrome, related to food ingestion by history  -Panic attack? Pain related?   -CTH neg  -Utox positive for benzo likely given en route to ER  -Back to baseline now   -Monitor     MARIVEL on CKD  -Continue IVF  -Continue Sodium Bicarb   -Nephro consult appreciated    Renal Masses  -MRI Abdomen     Anemia  -Likely on basis of CKD  -Low iron stores, started on Venofer   -FOBT pending     DVT Prophylaxis -- Venodyne    Dispo: Home once stable.  22 y/o female whose real name is Trinh Clark. brought from [facility] for evaluation.    Patient was apparently in her normal state of health at a [employer] event and shortly after eating rice/beans and meat started c/o abdominal pain and cramping and then vomited. Friends who witnessed it said they called EMS due to patient being very agitated. No one else reportedly was sick from eating the same food. No recent illnesses, travel, or sick contacts reported. Patient has never used illicit drugs and does not use alcohol. She has a hx of "kidney problems" and used to be on medications for it but self discontinued " a while ago." In th field Patient given Versed and then Ativan in ED to allow for exam and labs. ED attending states patient denying any trauma, or assault. No obvious injury on exam. Labs with Hbg of 8.6 and Cr. of 3.95 with reported baseline of 2.5. No reported mental health history.     AMS / Agitation  -No prodrome, related to food ingestion by history  -Panic attack? Pain related?   -CTH neg  -Utox positive for benzo likely given en route to ER  -Back to baseline now   -Monitor     MARIVEL on CKD  -Continue IVF  -Continue Sodium Bicarb   -Nephro consult appreciated    Renal Masses  -MRI Abdomen     Anemia  -Likely on basis of CKD  -Low iron stores, started on Venofer   -FOBT pending     DVT Prophylaxis -- Venodyne    Dispo: Home once stable.

## 2024-01-03 LAB
ABO RH CONFIRMATION: SIGNIFICANT CHANGE UP
ABO RH CONFIRMATION: SIGNIFICANT CHANGE UP
ANA TITR SER: NEGATIVE — SIGNIFICANT CHANGE UP
ANA TITR SER: NEGATIVE — SIGNIFICANT CHANGE UP
ANION GAP SERPL CALC-SCNC: 9 MMOL/L — SIGNIFICANT CHANGE UP (ref 5–17)
ANION GAP SERPL CALC-SCNC: 9 MMOL/L — SIGNIFICANT CHANGE UP (ref 5–17)
APPEARANCE UR: ABNORMAL
APPEARANCE UR: ABNORMAL
APTT BLD: 28.9 SEC — SIGNIFICANT CHANGE UP (ref 24.5–35.6)
APTT BLD: 28.9 SEC — SIGNIFICANT CHANGE UP (ref 24.5–35.6)
B PERT DNA SPEC QL NAA+PROBE: SIGNIFICANT CHANGE UP
B PERT DNA SPEC QL NAA+PROBE: SIGNIFICANT CHANGE UP
BACTERIA # UR AUTO: ABNORMAL /HPF
BACTERIA # UR AUTO: ABNORMAL /HPF
BILIRUB UR-MCNC: NEGATIVE — SIGNIFICANT CHANGE UP
BILIRUB UR-MCNC: NEGATIVE — SIGNIFICANT CHANGE UP
BUN SERPL-MCNC: 30.1 MG/DL — HIGH (ref 8–20)
BUN SERPL-MCNC: 30.1 MG/DL — HIGH (ref 8–20)
C PNEUM DNA SPEC QL NAA+PROBE: SIGNIFICANT CHANGE UP
C PNEUM DNA SPEC QL NAA+PROBE: SIGNIFICANT CHANGE UP
CA-I BLD-SCNC: 1.1 MMOL/L — LOW (ref 1.15–1.33)
CA-I BLD-SCNC: 1.1 MMOL/L — LOW (ref 1.15–1.33)
CALCIUM SERPL-MCNC: 7.2 MG/DL — LOW (ref 8.4–10.5)
CALCIUM SERPL-MCNC: 7.2 MG/DL — LOW (ref 8.4–10.5)
CAST: 0 /LPF — SIGNIFICANT CHANGE UP (ref 0–4)
CAST: 0 /LPF — SIGNIFICANT CHANGE UP (ref 0–4)
CHLORIDE SERPL-SCNC: 118 MMOL/L — HIGH (ref 96–108)
CHLORIDE SERPL-SCNC: 118 MMOL/L — HIGH (ref 96–108)
CO2 SERPL-SCNC: 18 MMOL/L — LOW (ref 22–29)
CO2 SERPL-SCNC: 18 MMOL/L — LOW (ref 22–29)
COLOR SPEC: YELLOW — SIGNIFICANT CHANGE UP
COLOR SPEC: YELLOW — SIGNIFICANT CHANGE UP
CREAT SERPL-MCNC: 3.53 MG/DL — HIGH (ref 0.5–1.3)
CREAT SERPL-MCNC: 3.53 MG/DL — HIGH (ref 0.5–1.3)
DIFF PNL FLD: ABNORMAL
DIFF PNL FLD: ABNORMAL
EGFR: 17 ML/MIN/1.73M2 — LOW
EGFR: 17 ML/MIN/1.73M2 — LOW
FLUAV H1 2009 PAND RNA SPEC QL NAA+PROBE: SIGNIFICANT CHANGE UP
FLUAV H1 2009 PAND RNA SPEC QL NAA+PROBE: SIGNIFICANT CHANGE UP
FLUAV H1 RNA SPEC QL NAA+PROBE: SIGNIFICANT CHANGE UP
FLUAV H1 RNA SPEC QL NAA+PROBE: SIGNIFICANT CHANGE UP
FLUAV H3 RNA SPEC QL NAA+PROBE: SIGNIFICANT CHANGE UP
FLUAV H3 RNA SPEC QL NAA+PROBE: SIGNIFICANT CHANGE UP
FLUAV SUBTYP SPEC NAA+PROBE: SIGNIFICANT CHANGE UP
FLUAV SUBTYP SPEC NAA+PROBE: SIGNIFICANT CHANGE UP
FLUBV RNA SPEC QL NAA+PROBE: SIGNIFICANT CHANGE UP
FLUBV RNA SPEC QL NAA+PROBE: SIGNIFICANT CHANGE UP
GLUCOSE SERPL-MCNC: 128 MG/DL — HIGH (ref 70–99)
GLUCOSE SERPL-MCNC: 128 MG/DL — HIGH (ref 70–99)
GLUCOSE UR QL: NEGATIVE MG/DL — SIGNIFICANT CHANGE UP
GLUCOSE UR QL: NEGATIVE MG/DL — SIGNIFICANT CHANGE UP
HADV DNA SPEC QL NAA+PROBE: SIGNIFICANT CHANGE UP
HADV DNA SPEC QL NAA+PROBE: SIGNIFICANT CHANGE UP
HCOV PNL SPEC NAA+PROBE: SIGNIFICANT CHANGE UP
HCOV PNL SPEC NAA+PROBE: SIGNIFICANT CHANGE UP
HCT VFR BLD CALC: 22.4 % — LOW (ref 34.5–45)
HCT VFR BLD CALC: 22.4 % — LOW (ref 34.5–45)
HCT VFR BLD CALC: 26.3 % — LOW (ref 34.5–45)
HCT VFR BLD CALC: 26.3 % — LOW (ref 34.5–45)
HGB BLD-MCNC: 6.9 G/DL — CRITICAL LOW (ref 11.5–15.5)
HGB BLD-MCNC: 6.9 G/DL — CRITICAL LOW (ref 11.5–15.5)
HGB BLD-MCNC: 7.6 G/DL — LOW (ref 11.5–15.5)
HGB BLD-MCNC: 7.6 G/DL — LOW (ref 11.5–15.5)
HMPV RNA SPEC QL NAA+PROBE: SIGNIFICANT CHANGE UP
HMPV RNA SPEC QL NAA+PROBE: SIGNIFICANT CHANGE UP
HPIV1 RNA SPEC QL NAA+PROBE: SIGNIFICANT CHANGE UP
HPIV1 RNA SPEC QL NAA+PROBE: SIGNIFICANT CHANGE UP
HPIV2 RNA SPEC QL NAA+PROBE: SIGNIFICANT CHANGE UP
HPIV2 RNA SPEC QL NAA+PROBE: SIGNIFICANT CHANGE UP
HPIV3 RNA SPEC QL NAA+PROBE: SIGNIFICANT CHANGE UP
HPIV3 RNA SPEC QL NAA+PROBE: SIGNIFICANT CHANGE UP
HPIV4 RNA SPEC QL NAA+PROBE: SIGNIFICANT CHANGE UP
HPIV4 RNA SPEC QL NAA+PROBE: SIGNIFICANT CHANGE UP
INR BLD: 1.27 RATIO — HIGH (ref 0.85–1.18)
INR BLD: 1.27 RATIO — HIGH (ref 0.85–1.18)
KETONES UR-MCNC: NEGATIVE MG/DL — SIGNIFICANT CHANGE UP
KETONES UR-MCNC: NEGATIVE MG/DL — SIGNIFICANT CHANGE UP
LACTATE BLDV-MCNC: 1.3 MMOL/L — SIGNIFICANT CHANGE UP (ref 0.5–2)
LACTATE BLDV-MCNC: 1.3 MMOL/L — SIGNIFICANT CHANGE UP (ref 0.5–2)
LEUKOCYTE ESTERASE UR-ACNC: ABNORMAL
LEUKOCYTE ESTERASE UR-ACNC: ABNORMAL
MCHC RBC-ENTMCNC: 26.5 PG — LOW (ref 27–34)
MCHC RBC-ENTMCNC: 26.5 PG — LOW (ref 27–34)
MCHC RBC-ENTMCNC: 28.2 PG — SIGNIFICANT CHANGE UP (ref 27–34)
MCHC RBC-ENTMCNC: 28.2 PG — SIGNIFICANT CHANGE UP (ref 27–34)
MCHC RBC-ENTMCNC: 28.9 GM/DL — LOW (ref 32–36)
MCHC RBC-ENTMCNC: 28.9 GM/DL — LOW (ref 32–36)
MCHC RBC-ENTMCNC: 30.8 GM/DL — LOW (ref 32–36)
MCHC RBC-ENTMCNC: 30.8 GM/DL — LOW (ref 32–36)
MCV RBC AUTO: 91.4 FL — SIGNIFICANT CHANGE UP (ref 80–100)
MCV RBC AUTO: 91.4 FL — SIGNIFICANT CHANGE UP (ref 80–100)
MCV RBC AUTO: 91.6 FL — SIGNIFICANT CHANGE UP (ref 80–100)
MCV RBC AUTO: 91.6 FL — SIGNIFICANT CHANGE UP (ref 80–100)
NITRITE UR-MCNC: NEGATIVE — SIGNIFICANT CHANGE UP
NITRITE UR-MCNC: NEGATIVE — SIGNIFICANT CHANGE UP
PH UR: 6.5 — SIGNIFICANT CHANGE UP (ref 5–8)
PH UR: 6.5 — SIGNIFICANT CHANGE UP (ref 5–8)
PHOSPHATE SERPL-MCNC: 2.1 MG/DL — LOW (ref 2.4–4.7)
PHOSPHATE SERPL-MCNC: 2.1 MG/DL — LOW (ref 2.4–4.7)
PLATELET # BLD AUTO: 181 K/UL — SIGNIFICANT CHANGE UP (ref 150–400)
PLATELET # BLD AUTO: 181 K/UL — SIGNIFICANT CHANGE UP (ref 150–400)
PLATELET # BLD AUTO: 229 K/UL — SIGNIFICANT CHANGE UP (ref 150–400)
PLATELET # BLD AUTO: 229 K/UL — SIGNIFICANT CHANGE UP (ref 150–400)
POTASSIUM SERPL-MCNC: 3.9 MMOL/L — SIGNIFICANT CHANGE UP (ref 3.5–5.3)
POTASSIUM SERPL-MCNC: 3.9 MMOL/L — SIGNIFICANT CHANGE UP (ref 3.5–5.3)
POTASSIUM SERPL-SCNC: 3.9 MMOL/L — SIGNIFICANT CHANGE UP (ref 3.5–5.3)
POTASSIUM SERPL-SCNC: 3.9 MMOL/L — SIGNIFICANT CHANGE UP (ref 3.5–5.3)
PROT UR-MCNC: 30 MG/DL
PROT UR-MCNC: 30 MG/DL
PROTHROM AB SERPL-ACNC: 14 SEC — HIGH (ref 9.5–13)
PROTHROM AB SERPL-ACNC: 14 SEC — HIGH (ref 9.5–13)
RAPID RVP RESULT: DETECTED
RAPID RVP RESULT: DETECTED
RBC # BLD: 2.45 M/UL — LOW (ref 3.8–5.2)
RBC # BLD: 2.45 M/UL — LOW (ref 3.8–5.2)
RBC # BLD: 2.87 M/UL — LOW (ref 3.8–5.2)
RBC # BLD: 2.87 M/UL — LOW (ref 3.8–5.2)
RBC # FLD: 15.6 % — HIGH (ref 10.3–14.5)
RBC # FLD: 15.6 % — HIGH (ref 10.3–14.5)
RBC # FLD: 15.7 % — HIGH (ref 10.3–14.5)
RBC # FLD: 15.7 % — HIGH (ref 10.3–14.5)
RBC CASTS # UR COMP ASSIST: 21 /HPF — HIGH (ref 0–4)
RBC CASTS # UR COMP ASSIST: 21 /HPF — HIGH (ref 0–4)
RV+EV RNA SPEC QL NAA+PROBE: SIGNIFICANT CHANGE UP
RV+EV RNA SPEC QL NAA+PROBE: SIGNIFICANT CHANGE UP
SARS-COV-2 RNA SPEC QL NAA+PROBE: DETECTED
SARS-COV-2 RNA SPEC QL NAA+PROBE: DETECTED
SODIUM SERPL-SCNC: 145 MMOL/L — SIGNIFICANT CHANGE UP (ref 135–145)
SODIUM SERPL-SCNC: 145 MMOL/L — SIGNIFICANT CHANGE UP (ref 135–145)
SP GR SPEC: 1.01 — SIGNIFICANT CHANGE UP (ref 1–1.03)
SP GR SPEC: 1.01 — SIGNIFICANT CHANGE UP (ref 1–1.03)
SQUAMOUS # UR AUTO: 2 /HPF — SIGNIFICANT CHANGE UP (ref 0–5)
SQUAMOUS # UR AUTO: 2 /HPF — SIGNIFICANT CHANGE UP (ref 0–5)
TSH SERPL-MCNC: 1.99 UIU/ML — SIGNIFICANT CHANGE UP (ref 0.27–4.2)
TSH SERPL-MCNC: 1.99 UIU/ML — SIGNIFICANT CHANGE UP (ref 0.27–4.2)
UROBILINOGEN FLD QL: 0.2 MG/DL — SIGNIFICANT CHANGE UP (ref 0.2–1)
UROBILINOGEN FLD QL: 0.2 MG/DL — SIGNIFICANT CHANGE UP (ref 0.2–1)
WBC # BLD: 10.71 K/UL — HIGH (ref 3.8–10.5)
WBC # BLD: 10.71 K/UL — HIGH (ref 3.8–10.5)
WBC # BLD: 13.68 K/UL — HIGH (ref 3.8–10.5)
WBC # BLD: 13.68 K/UL — HIGH (ref 3.8–10.5)
WBC # FLD AUTO: 10.71 K/UL — HIGH (ref 3.8–10.5)
WBC # FLD AUTO: 10.71 K/UL — HIGH (ref 3.8–10.5)
WBC # FLD AUTO: 13.68 K/UL — HIGH (ref 3.8–10.5)
WBC # FLD AUTO: 13.68 K/UL — HIGH (ref 3.8–10.5)
WBC UR QL: 87 /HPF — HIGH (ref 0–5)
WBC UR QL: 87 /HPF — HIGH (ref 0–5)

## 2024-01-03 PROCEDURE — 93010 ELECTROCARDIOGRAM REPORT: CPT

## 2024-01-03 PROCEDURE — 99233 SBSQ HOSP IP/OBS HIGH 50: CPT

## 2024-01-03 PROCEDURE — 99223 1ST HOSP IP/OBS HIGH 75: CPT

## 2024-01-03 PROCEDURE — 93306 TTE W/DOPPLER COMPLETE: CPT | Mod: 26

## 2024-01-03 RX ORDER — MIDODRINE HYDROCHLORIDE 2.5 MG/1
10 TABLET ORAL THREE TIMES A DAY
Refills: 0 | Status: DISCONTINUED | OUTPATIENT
Start: 2024-01-03 | End: 2024-01-04

## 2024-01-03 RX ORDER — HYDROCORTISONE 20 MG
50 TABLET ORAL EVERY 6 HOURS
Refills: 0 | Status: DISCONTINUED | OUTPATIENT
Start: 2024-01-03 | End: 2024-01-04

## 2024-01-03 RX ORDER — SODIUM CHLORIDE 9 MG/ML
1000 INJECTION INTRAMUSCULAR; INTRAVENOUS; SUBCUTANEOUS ONCE
Refills: 0 | Status: COMPLETED | OUTPATIENT
Start: 2024-01-03 | End: 2024-01-03

## 2024-01-03 RX ORDER — SODIUM BICARBONATE 1 MEQ/ML
1300 SYRINGE (ML) INTRAVENOUS THREE TIMES A DAY
Refills: 0 | Status: DISCONTINUED | OUTPATIENT
Start: 2024-01-03 | End: 2024-01-08

## 2024-01-03 RX ORDER — SODIUM BICARBONATE 1 MEQ/ML
1300 SYRINGE (ML) INTRAVENOUS
Refills: 0 | Status: DISCONTINUED | OUTPATIENT
Start: 2024-01-03 | End: 2024-01-03

## 2024-01-03 RX ORDER — CALCIUM GLUCONATE 100 MG/ML
2 VIAL (ML) INTRAVENOUS EVERY 6 HOURS
Refills: 0 | Status: COMPLETED | OUTPATIENT
Start: 2024-01-03 | End: 2024-01-04

## 2024-01-03 RX ORDER — MIDODRINE HYDROCHLORIDE 2.5 MG/1
10 TABLET ORAL ONCE
Refills: 0 | Status: DISCONTINUED | OUTPATIENT
Start: 2024-01-03 | End: 2024-01-09

## 2024-01-03 RX ORDER — ERYTHROPOIETIN 10000 [IU]/ML
20000 INJECTION, SOLUTION INTRAVENOUS; SUBCUTANEOUS
Refills: 0 | Status: DISCONTINUED | OUTPATIENT
Start: 2024-01-03 | End: 2024-01-12

## 2024-01-03 RX ORDER — SODIUM CHLORIDE 9 MG/ML
1000 INJECTION, SOLUTION INTRAVENOUS
Refills: 0 | Status: DISCONTINUED | OUTPATIENT
Start: 2024-01-03 | End: 2024-01-04

## 2024-01-03 RX ORDER — ALBUTEROL 90 UG/1
2 AEROSOL, METERED ORAL EVERY 6 HOURS
Refills: 0 | Status: DISCONTINUED | OUTPATIENT
Start: 2024-01-03 | End: 2024-01-12

## 2024-01-03 RX ORDER — SODIUM,POTASSIUM PHOSPHATES 278-250MG
1 POWDER IN PACKET (EA) ORAL
Refills: 0 | Status: COMPLETED | OUTPATIENT
Start: 2024-01-03 | End: 2024-01-05

## 2024-01-03 RX ORDER — PANTOPRAZOLE SODIUM 20 MG/1
40 TABLET, DELAYED RELEASE ORAL
Refills: 0 | Status: DISCONTINUED | OUTPATIENT
Start: 2024-01-03 | End: 2024-01-12

## 2024-01-03 RX ADMIN — Medication 650 MILLIGRAM(S): at 06:56

## 2024-01-03 RX ADMIN — PANTOPRAZOLE SODIUM 40 MILLIGRAM(S): 20 TABLET, DELAYED RELEASE ORAL at 18:37

## 2024-01-03 RX ADMIN — Medication 1300 MILLIGRAM(S): at 16:07

## 2024-01-03 RX ADMIN — ERYTHROPOIETIN 20000 UNIT(S): 10000 INJECTION, SOLUTION INTRAVENOUS; SUBCUTANEOUS at 16:07

## 2024-01-03 RX ADMIN — SODIUM CHLORIDE 3 MILLILITER(S): 9 INJECTION INTRAMUSCULAR; INTRAVENOUS; SUBCUTANEOUS at 06:54

## 2024-01-03 RX ADMIN — IRON SUCROSE 110 MILLIGRAM(S): 20 INJECTION, SOLUTION INTRAVENOUS at 16:06

## 2024-01-03 RX ADMIN — MIDODRINE HYDROCHLORIDE 10 MILLIGRAM(S): 2.5 TABLET ORAL at 05:32

## 2024-01-03 RX ADMIN — SODIUM CHLORIDE 82 MILLILITER(S): 9 INJECTION, SOLUTION INTRAVENOUS at 21:34

## 2024-01-03 RX ADMIN — PIPERACILLIN AND TAZOBACTAM 25 GRAM(S): 4; .5 INJECTION, POWDER, LYOPHILIZED, FOR SOLUTION INTRAVENOUS at 18:37

## 2024-01-03 RX ADMIN — SODIUM CHLORIDE 3 MILLILITER(S): 9 INJECTION INTRAMUSCULAR; INTRAVENOUS; SUBCUTANEOUS at 11:06

## 2024-01-03 RX ADMIN — Medication 50 MILLIGRAM(S): at 16:07

## 2024-01-03 RX ADMIN — Medication 200 GRAM(S): at 18:36

## 2024-01-03 RX ADMIN — SODIUM CHLORIDE 3 MILLILITER(S): 9 INJECTION INTRAMUSCULAR; INTRAVENOUS; SUBCUTANEOUS at 22:26

## 2024-01-03 RX ADMIN — PIPERACILLIN AND TAZOBACTAM 25 GRAM(S): 4; .5 INJECTION, POWDER, LYOPHILIZED, FOR SOLUTION INTRAVENOUS at 06:57

## 2024-01-03 RX ADMIN — Medication 1 TABLET(S): at 16:07

## 2024-01-03 RX ADMIN — Medication 50 MILLIGRAM(S): at 06:55

## 2024-01-03 RX ADMIN — Medication 250 MILLIGRAM(S): at 01:06

## 2024-01-03 RX ADMIN — MIDODRINE HYDROCHLORIDE 10 MILLIGRAM(S): 2.5 TABLET ORAL at 16:07

## 2024-01-03 RX ADMIN — MIDODRINE HYDROCHLORIDE 10 MILLIGRAM(S): 2.5 TABLET ORAL at 18:37

## 2024-01-03 RX ADMIN — Medication 1 MILLIGRAM(S): at 16:07

## 2024-01-03 RX ADMIN — Medication 1 PACKET(S): at 18:38

## 2024-01-03 RX ADMIN — SODIUM CHLORIDE 82 MILLILITER(S): 9 INJECTION, SOLUTION INTRAVENOUS at 12:38

## 2024-01-03 RX ADMIN — Medication 1300 MILLIGRAM(S): at 21:35

## 2024-01-03 RX ADMIN — Medication 50 MILLIGRAM(S): at 21:34

## 2024-01-03 RX ADMIN — SODIUM CHLORIDE 1000 MILLILITER(S): 9 INJECTION INTRAMUSCULAR; INTRAVENOUS; SUBCUTANEOUS at 06:05

## 2024-01-03 NOTE — PROGRESS NOTE ADULT - SUBJECTIVE AND OBJECTIVE BOX
NEPHROLOGY INTERVAL HPI/OVERNIGHT EVENTS:    No new events.    MEDICATIONS  (STANDING):  enoxaparin Injectable 30 milliGRAM(s) SubCutaneous every 24 hours  folic acid 1 milliGRAM(s) Oral daily  iron sucrose IVPB 200 milliGRAM(s) IV Intermittent every 24 hours  multivitamin 1 Tablet(s) Oral daily  sodium bicarbonate 650 milliGRAM(s) Oral every 8 hours  sodium chloride 0.45% 1000 milliLiter(s) (125 mL/Hr) IV Continuous <Continuous>  sodium chloride 0.9% lock flush 3 milliLiter(s) IV Push every 8 hours    MEDICATIONS  (PRN):  acetaminophen     Tablet .. 650 milliGRAM(s) Oral every 6 hours PRN Temp greater or equal to 38C (100.4F), Mild Pain (1 - 3)  aluminum hydroxide/magnesium hydroxide/simethicone Suspension 30 milliLiter(s) Oral every 4 hours PRN Dyspepsia  LORazepam   Injectable 1 milliGRAM(s) IV Push every 8 hours PRN Agitation  melatonin 3 milliGRAM(s) Oral at bedtime PRN Insomnia  ondansetron Injectable 4 milliGRAM(s) IV Push every 8 hours PRN Nausea and/or Vomiting      Allergies    No Known Allergies          Vital Signs Last 24 Hrs  T(C): 37 (03 Jan 2024 08:34), Max: 40.9 (02 Jan 2024 21:55)  T(F): 98.6 (03 Jan 2024 08:34), Max: 105.6 (02 Jan 2024 21:55)  HR: 63 (03 Jan 2024 08:34) (63 - 155)  BP: 102/69 (03 Jan 2024 08:34) (70/30 - 126/90)  BP(mean): 75 (02 Jan 2024 10:26) (75 - 75)  RR: 18 (03 Jan 2024 08:34) (18 - 20)  SpO2: 95% (03 Jan 2024 08:34) (95% - 100%)    Parameters below as of 03 Jan 2024 06:30  Patient On (Oxygen Delivery Method): room air      T(C): 36.8 (02 Jan 2024 10:26), Max: 36.8 (02 Jan 2024 01:02)  T(F): 98.3 (02 Jan 2024 10:26), Max: 98.3 (02 Jan 2024 10:26)  HR: 94 (02 Jan 2024 10:26) (81 - 94)  BP: 99/64 (02 Jan 2024 10:26) (89/56 - 105/58)  BP(mean): 75 (02 Jan 2024 10:26) (75 - 75)  RR: 18 (02 Jan 2024 10:26) (18 - 18)  SpO2: 95% (02 Jan 2024 10:26) (95% - 100%)    Parameters below as of 02 Jan 2024 10:26  Patient On (Oxygen Delivery Method): room air      PHYSICAL EXAM:    GENERAL: Comfortable in bed in ED  HEAD: NCAT  EYES: EOMI  NECK: Supple  NERVOUS SYSTEM:  Alert & Oriented , Nigerian speaking  CHEST/LUNG: Clear to percussion bilaterally  HEART: Regular rate and rhythm  ABDOMEN: Soft, Nontender, Nondistended; +BS  EXTREMITIES: no edema  : Neg      LABS:    01-03    145  |  118<H>  |  30.1<H>  ----------------------------<  128<H>  3.9   |  18.0<L>  |  3.53<H>    Ca    7.2<L>      03 Jan 2024 06:42  Phos  2.1     01-03  Mg     2.1     01-02    TPro  6.8  /  Alb  3.8  /  TBili  0.3<L>  /  DBili  x   /  AST  15  /  ALT  8   /  AlkPhos  63  01-02                          8.6    5.52  )-----------( 257      ( 01 Jan 2024 03:50 )             26.9     01-02    142  |  108  |  27.2<H>  ----------------------------<  64<L>  3.8   |  16.0<L>  |  3.60<H>    Ca    8.6      02 Jan 2024 02:49  Phos  3.8     01-02  Mg     2.1     01-02    TPro  7.1  /  Alb  3.7  /  TBili  0.3<L>  /  DBili  x   /  AST  13  /  ALT  7   /  AlkPhos  58  01-02      Urinalysis Basic - ( 02 Jan 2024 02:49 )    Color: x / Appearance: x / SG: x / pH: x  Gluc: 64 mg/dL / Ketone: x  / Bili: x / Urobili: x   Blood: x / Protein: x / Nitrite: x   Leuk Esterase: x / RBC: x / WBC x   Sq Epi: x / Non Sq Epi: x / Bacteria: x      Magnesium: 2.1 mg/dL (01-02 @ 02:49)  Phosphorus: 3.8 mg/dL (01-02 @ 02:49)  Intact PTH: 73 pg/mL (01-02 @ 02:49)  C3 Complement, Serum: 146 mg/dL (01-02 @ 02:49)  C4 Complement, Serum: 39 mg/dL (01-02 @ 02:49)          RADIOLOGY & ADDITIONAL TESTS:   NEPHROLOGY INTERVAL HPI/OVERNIGHT EVENTS:    No new events.    MEDICATIONS  (STANDING):  enoxaparin Injectable 30 milliGRAM(s) SubCutaneous every 24 hours  folic acid 1 milliGRAM(s) Oral daily  iron sucrose IVPB 200 milliGRAM(s) IV Intermittent every 24 hours  multivitamin 1 Tablet(s) Oral daily  sodium bicarbonate 650 milliGRAM(s) Oral every 8 hours  sodium chloride 0.45% 1000 milliLiter(s) (125 mL/Hr) IV Continuous <Continuous>  sodium chloride 0.9% lock flush 3 milliLiter(s) IV Push every 8 hours    MEDICATIONS  (PRN):  acetaminophen     Tablet .. 650 milliGRAM(s) Oral every 6 hours PRN Temp greater or equal to 38C (100.4F), Mild Pain (1 - 3)  aluminum hydroxide/magnesium hydroxide/simethicone Suspension 30 milliLiter(s) Oral every 4 hours PRN Dyspepsia  LORazepam   Injectable 1 milliGRAM(s) IV Push every 8 hours PRN Agitation  melatonin 3 milliGRAM(s) Oral at bedtime PRN Insomnia  ondansetron Injectable 4 milliGRAM(s) IV Push every 8 hours PRN Nausea and/or Vomiting      Allergies    No Known Allergies          Vital Signs Last 24 Hrs  T(C): 37 (03 Jan 2024 08:34), Max: 40.9 (02 Jan 2024 21:55)  T(F): 98.6 (03 Jan 2024 08:34), Max: 105.6 (02 Jan 2024 21:55)  HR: 63 (03 Jan 2024 08:34) (63 - 155)  BP: 102/69 (03 Jan 2024 08:34) (70/30 - 126/90)  BP(mean): 75 (02 Jan 2024 10:26) (75 - 75)  RR: 18 (03 Jan 2024 08:34) (18 - 20)  SpO2: 95% (03 Jan 2024 08:34) (95% - 100%)    Parameters below as of 03 Jan 2024 06:30  Patient On (Oxygen Delivery Method): room air      T(C): 36.8 (02 Jan 2024 10:26), Max: 36.8 (02 Jan 2024 01:02)  T(F): 98.3 (02 Jan 2024 10:26), Max: 98.3 (02 Jan 2024 10:26)  HR: 94 (02 Jan 2024 10:26) (81 - 94)  BP: 99/64 (02 Jan 2024 10:26) (89/56 - 105/58)  BP(mean): 75 (02 Jan 2024 10:26) (75 - 75)  RR: 18 (02 Jan 2024 10:26) (18 - 18)  SpO2: 95% (02 Jan 2024 10:26) (95% - 100%)    Parameters below as of 02 Jan 2024 10:26  Patient On (Oxygen Delivery Method): room air      PHYSICAL EXAM:    GENERAL: Comfortable in bed in ED  HEAD: NCAT  EYES: EOMI  NECK: Supple  NERVOUS SYSTEM:  Alert & Oriented , Egyptian speaking  CHEST/LUNG: Clear to percussion bilaterally  HEART: Regular rate and rhythm  ABDOMEN: Soft, Nontender, Nondistended; +BS  EXTREMITIES: no edema  : Neg      LABS:    01-03    145  |  118<H>  |  30.1<H>  ----------------------------<  128<H>  3.9   |  18.0<L>  |  3.53<H>    Ca    7.2<L>      03 Jan 2024 06:42  Phos  2.1     01-03  Mg     2.1     01-02    TPro  6.8  /  Alb  3.8  /  TBili  0.3<L>  /  DBili  x   /  AST  15  /  ALT  8   /  AlkPhos  63  01-02                          8.6    5.52  )-----------( 257      ( 01 Jan 2024 03:50 )             26.9     01-02    142  |  108  |  27.2<H>  ----------------------------<  64<L>  3.8   |  16.0<L>  |  3.60<H>    Ca    8.6      02 Jan 2024 02:49  Phos  3.8     01-02  Mg     2.1     01-02    TPro  7.1  /  Alb  3.7  /  TBili  0.3<L>  /  DBili  x   /  AST  13  /  ALT  7   /  AlkPhos  58  01-02      Urinalysis Basic - ( 02 Jan 2024 02:49 )    Color: x / Appearance: x / SG: x / pH: x  Gluc: 64 mg/dL / Ketone: x  / Bili: x / Urobili: x   Blood: x / Protein: x / Nitrite: x   Leuk Esterase: x / RBC: x / WBC x   Sq Epi: x / Non Sq Epi: x / Bacteria: x      Magnesium: 2.1 mg/dL (01-02 @ 02:49)  Phosphorus: 3.8 mg/dL (01-02 @ 02:49)  Intact PTH: 73 pg/mL (01-02 @ 02:49)  C3 Complement, Serum: 146 mg/dL (01-02 @ 02:49)  C4 Complement, Serum: 39 mg/dL (01-02 @ 02:49)          RADIOLOGY & ADDITIONAL TESTS:

## 2024-01-03 NOTE — PROGRESS NOTE ADULT - ASSESSMENT
A) CRF, UTI, Metabolic acidosis  with normal anion gap, + Viral illnesses. Falling Hb. Low Phos.      P) IV  fluid, bicarb, IV  iron, MARY. PO PHOS., will need  blood.

## 2024-01-03 NOTE — PROGRESS NOTE ADULT - SUBJECTIVE AND OBJECTIVE BOX
Acute renal failure    HPI:  24 y/o female whose real name is Trinh Clark. History obtained from 2 [employer] friends at bedside who arrived after Patient brought from field to ED for c/o acting strange at a [employer] event. Language line not working at this time, History obtained from ED chart and 2 friends who know patient peripherally. Patient was apparently in her normal state of health at a [employer] event and shortly after eating rice/beans and meat started c/o abdominal pain and cramping and then vomited. Friends who witnessed it said they called EMS due to patient being very agitated. No one else reportedly was sick from eating the same food. No recent illnesses, travel, or sick contacts reported. Patient has never used illicit drugs and does not use alcohol. She has a hx of "kidney problems" and used to be on medications for it but self discontinued " a while ago." In th field Patient given Versed and then Ativan in ED to allow for exam and labs. Vitals with tachycardia/tachypnea on arrival due to agitation. ED attending states patient denying any trauma, or assault. No obvious injury on exam. Labs with Hbg of 8.6 and Cr. of 3.95 with reported baseline of 2.5. No reported mental health history.     : Tita Clark [phone]-No answer at this time  (01 Jan 2024 05:57)    Interval History:  Patient was seen and examined at bedside around 11:45 am with  - Toy (ID#[id number]).   Code Sepsis last night. Found to have COVID-19.   Currently feels OK.  Denies chest pain, palpitations, shortness of breath, dizziness, visual symptoms, nausea, vomiting, abdominal pain or urinary symptoms.     ROS:  As per interval history otherwise unremarkable.    PHYSICAL EXAM:  Vital Signs   T(C): 36.5 (03 Jan 2024 11:00), Max: 40.9 (02 Jan 2024 21:55)  T(F): 97.7 (03 Jan 2024 11:00), Max: 105.6 (02 Jan 2024 21:55)  HR: 82 (03 Jan 2024 11:00) (63 - 155)  BP: 97/60 (03 Jan 2024 11:00) (70/30 - 126/90)  RR: 18 (03 Jan 2024 11:00) (18 - 20)  SpO2: 97% (03 Jan 2024 11:00) (95% - 100%)  Parameters below as of 03 Jan 2024 11:00  Patient On (Oxygen Delivery Method): room air  General: Young female sitting in bed comfortably. No acute distress  HEENT: EOMI. Clear conjunctivae. Moist mucus membrane  Neck: Supple.   Chest: CTA bilaterally. No wheezing, rales or rhonchi.   Heart: Normal S1 & S2. RRR.   Abdomen: Non distended. Soft. Non-tender. + BS  Ext: No pedal edema. No calf tenderness   Neuro: AAO x 3. No focal deficit. No speech disorder  Skin: Warm and Dry  Psychiatry: Normal mood and affect    LABS:                        7.6    13.68 )-----------( 229      ( 03 Jan 2024 08:58 )             26.3     01-03    145  |  118<H>  |  30.1<H>  ----------------------------<  128<H>  3.9   |  18.0<L>  |  3.53<H>    Ca    7.2<L>      03 Jan 2024 06:42  Phos  2.1     01-03  Mg     2.1     01-02    TPro  6.8  /  Alb  3.8  /  TBili  0.3<L>  /  DBili  x   /  AST  15  /  ALT  8   /  AlkPhos  63  01-02    PT/INR - ( 02 Jan 2024 23:51 )   PT: 14.0 sec;   INR: 1.27 ratio       PTT - ( 02 Jan 2024 23:51 )  PTT:28.9 sec    RADIOLOGY & ADDITIONAL STUDIES:  Reviewed     MEDICATIONS  (STANDING):  acetaminophen   IVPB .. 1000 milliGRAM(s) IV Intermittent once  calcium gluconate IVPB 2 Gram(s) IV Intermittent every 6 hours  epoetin dev (PROCRIT) Injectable 71349 Unit(s) SubCutaneous every 7 days  folic acid 1 milliGRAM(s) Oral daily  hydrocortisone sodium succinate Injectable 50 milliGRAM(s) IV Push every 6 hours  iron sucrose IVPB 200 milliGRAM(s) IV Intermittent every 24 hours  midodrine. 10 milliGRAM(s) Oral once  midodrine. 10 milliGRAM(s) Oral three times a day  multivitamin 1 Tablet(s) Oral daily  piperacillin/tazobactam IVPB.. 3.375 Gram(s) IV Intermittent every 12 hours  potassium phosphate / sodium phosphate Powder (PHOS-NaK) 1 Packet(s) Oral two times a day  sodium bicarbonate 1300 milliGRAM(s) Oral three times a day  sodium chloride 0.45% 1000 milliLiter(s) (82 mL/Hr) IV Continuous <Continuous>  sodium chloride 0.9% lock flush 3 milliLiter(s) IV Push every 8 hours    MEDICATIONS  (PRN):  acetaminophen     Tablet .. 650 milliGRAM(s) Oral every 6 hours PRN Temp greater or equal to 38C (100.4F), Mild Pain (1 - 3)  aluminum hydroxide/magnesium hydroxide/simethicone Suspension 30 milliLiter(s) Oral every 4 hours PRN Dyspepsia  LORazepam   Injectable 1 milliGRAM(s) IV Push every 8 hours PRN Agitation  melatonin 3 milliGRAM(s) Oral at bedtime PRN Insomnia  ondansetron Injectable 4 milliGRAM(s) IV Push every 8 hours PRN Nausea and/or Vomiting         Acute renal failure    HPI:  24 y/o female whose real name is Trinh Clark. History obtained from 2 [employer] friends at bedside who arrived after Patient brought from field to ED for c/o acting strange at a [employer] event. Language line not working at this time, History obtained from ED chart and 2 friends who know patient peripherally. Patient was apparently in her normal state of health at a [employer] event and shortly after eating rice/beans and meat started c/o abdominal pain and cramping and then vomited. Friends who witnessed it said they called EMS due to patient being very agitated. No one else reportedly was sick from eating the same food. No recent illnesses, travel, or sick contacts reported. Patient has never used illicit drugs and does not use alcohol. She has a hx of "kidney problems" and used to be on medications for it but self discontinued " a while ago." In th field Patient given Versed and then Ativan in ED to allow for exam and labs. Vitals with tachycardia/tachypnea on arrival due to agitation. ED attending states patient denying any trauma, or assault. No obvious injury on exam. Labs with Hbg of 8.6 and Cr. of 3.95 with reported baseline of 2.5. No reported mental health history.     : Tita Clark [phone]-No answer at this time  (01 Jan 2024 05:57)    Interval History:  Patient was seen and examined at bedside around 11:45 am with  - Toy (ID#[id number]).   Code Sepsis last night. Found to have COVID-19.   Currently feels OK.  Denies chest pain, palpitations, shortness of breath, dizziness, visual symptoms, nausea, vomiting, abdominal pain or urinary symptoms.     ROS:  As per interval history otherwise unremarkable.    PHYSICAL EXAM:  Vital Signs   T(C): 36.5 (03 Jan 2024 11:00), Max: 40.9 (02 Jan 2024 21:55)  T(F): 97.7 (03 Jan 2024 11:00), Max: 105.6 (02 Jan 2024 21:55)  HR: 82 (03 Jan 2024 11:00) (63 - 155)  BP: 97/60 (03 Jan 2024 11:00) (70/30 - 126/90)  RR: 18 (03 Jan 2024 11:00) (18 - 20)  SpO2: 97% (03 Jan 2024 11:00) (95% - 100%)  Parameters below as of 03 Jan 2024 11:00  Patient On (Oxygen Delivery Method): room air  General: Young female sitting in bed comfortably. No acute distress  HEENT: EOMI. Clear conjunctivae. Moist mucus membrane  Neck: Supple.   Chest: CTA bilaterally. No wheezing, rales or rhonchi.   Heart: Normal S1 & S2. RRR.   Abdomen: Non distended. Soft. Non-tender. + BS  Ext: No pedal edema. No calf tenderness   Neuro: AAO x 3. No focal deficit. No speech disorder  Skin: Warm and Dry  Psychiatry: Normal mood and affect    LABS:                        7.6    13.68 )-----------( 229      ( 03 Jan 2024 08:58 )             26.3     01-03    145  |  118<H>  |  30.1<H>  ----------------------------<  128<H>  3.9   |  18.0<L>  |  3.53<H>    Ca    7.2<L>      03 Jan 2024 06:42  Phos  2.1     01-03  Mg     2.1     01-02    TPro  6.8  /  Alb  3.8  /  TBili  0.3<L>  /  DBili  x   /  AST  15  /  ALT  8   /  AlkPhos  63  01-02    PT/INR - ( 02 Jan 2024 23:51 )   PT: 14.0 sec;   INR: 1.27 ratio       PTT - ( 02 Jan 2024 23:51 )  PTT:28.9 sec    RADIOLOGY & ADDITIONAL STUDIES:  Reviewed     MEDICATIONS  (STANDING):  acetaminophen   IVPB .. 1000 milliGRAM(s) IV Intermittent once  calcium gluconate IVPB 2 Gram(s) IV Intermittent every 6 hours  epoetin dev (PROCRIT) Injectable 70709 Unit(s) SubCutaneous every 7 days  folic acid 1 milliGRAM(s) Oral daily  hydrocortisone sodium succinate Injectable 50 milliGRAM(s) IV Push every 6 hours  iron sucrose IVPB 200 milliGRAM(s) IV Intermittent every 24 hours  midodrine. 10 milliGRAM(s) Oral once  midodrine. 10 milliGRAM(s) Oral three times a day  multivitamin 1 Tablet(s) Oral daily  piperacillin/tazobactam IVPB.. 3.375 Gram(s) IV Intermittent every 12 hours  potassium phosphate / sodium phosphate Powder (PHOS-NaK) 1 Packet(s) Oral two times a day  sodium bicarbonate 1300 milliGRAM(s) Oral three times a day  sodium chloride 0.45% 1000 milliLiter(s) (82 mL/Hr) IV Continuous <Continuous>  sodium chloride 0.9% lock flush 3 milliLiter(s) IV Push every 8 hours    MEDICATIONS  (PRN):  acetaminophen     Tablet .. 650 milliGRAM(s) Oral every 6 hours PRN Temp greater or equal to 38C (100.4F), Mild Pain (1 - 3)  aluminum hydroxide/magnesium hydroxide/simethicone Suspension 30 milliLiter(s) Oral every 4 hours PRN Dyspepsia  LORazepam   Injectable 1 milliGRAM(s) IV Push every 8 hours PRN Agitation  melatonin 3 milliGRAM(s) Oral at bedtime PRN Insomnia  ondansetron Injectable 4 milliGRAM(s) IV Push every 8 hours PRN Nausea and/or Vomiting

## 2024-01-03 NOTE — CHART NOTE - NSCHARTNOTEFT_GEN_A_CORE
Pt seen at bedside this am after RN reporting pt BP 70/30  Resting comfortably in NAD. A&Ox3. Endorsing mild dizziness on ambulation.   Denies chest pain, SOB, n/v/d/c, abdominal pain, blurry vision.   All remainder of vital signs stable, afebrile  Ordered 1L NS bolus with minimal improvement in BP  MICU consulted, suspect adrenal insufficiency  Start Solucortef 50mg q6h and midodrine 10mg TID

## 2024-01-03 NOTE — CHART NOTE - NSCHARTNOTEFT_GEN_A_CORE
CODE SEPSIS 2HR FOLLOW UP NOTE:    Patient seen and examined at bedside as follow up of recently called CODE SEPSIS.   Pt lying in bed  appears comfortable  breathing easy and unlabored  capillary refill<2sec  pt without complaints    VS                      8.9    6.77  )-----------( 232      ( 02 Jan 2024 22:09 )             28.1     01-02    144  |  109<H>  |  30.6<H>  ----------------------------<  115<H>  4.0   |  21.0<L>  |  3.91    CXR no obvious infiltrates  RVP pending  Lactate pending  UA pending    Antipyretics, IV fluids and empiric antibiotics were promptly initiated and sepsis workup sent  Pt hemodynamically stable  Continue to monitor  Call PA if any changes in pts condition CODE SEPSIS 2HR FOLLOW UP NOTE:    Patient seen and examined at bedside as follow up of recently called CODE SEPSIS.   Pt lying in bed  appears comfortable  breathing easy and unlabored  capillary refill<2sec  pt without complaints    VS B/P 93/51 P 103 R 18 PO 96% T 98.3                      8.9    6.77  )-----------( 232      ( 02 Jan 2024 22:09 )             28.1     01-02    144  |  109<H>  |  30.6<H>  ----------------------------<  115<H>  4.0   |  21.0<L>  |  3.91    CXR no obvious infiltrates  RVP pending  Lactate pending  UA pending    Antipyretics, IV fluids and empiric antibiotics were promptly initiated and sepsis workup sent  Pt hemodynamically stable  Continue to monitor  Call PA if any changes in pts condition

## 2024-01-03 NOTE — CONSULT NOTE ADULT - SUBJECTIVE AND OBJECTIVE BOX
Assessment/plan:     23-year-old  female with possible kidney disease as well as thyroid disease initially admitted for altered mental status with acute metabolic encephalopathy with acute kidney injury on possible CKD with metabolic acidosis  and probable UTI with hospital course complicated by high-grade fever, hypotension with SARS-CoV-2 systemic viral illness ruling out bacteremia     recommend:  1 L IV fluid bolus with NS/LR   continue with maintenance sodium bicarb fluid at 125 cc/h   repeat lactate ( initial lactate earlier than 90 with initial rapid response negative)   transthoracic echocardiogram   midodrine 10 mg 3 times daily with holding parameters   Solu-Cortef 50 mg IV every 6 hours with cortisol level in the morning   thyroid function test   follow-up on blood cultures and broad-spectrum antibiotics in the meantime   RVP positive for SARS-CoV-2, airborne isolation, not on any supplemental oxygen and saturating well more than 95% without respiratory distress hence would avoid remdesivir with possible MARIVEL on CKD and Solu-Cortef as above supportive treatment  Management of renal failure as per nephrology   during the end of the interview: Patient's systolic improved in 90s with normal mentation with a warm extremities with good urine output    _________________________________    Chief complaint:     altered mental status    HPI/Hospital course:  23-year-old  female who is in her usual state of health presented from [facility] on January 1 with nausea vomiting abdominal pain and altered mental status with possible history of kidney and thyroid disease not taking any medication with sick contact of COVID-19 from her 2-year-old son about a week ago, managed with intramuscular and intravenous benzodiazepine as well as bicarbonate and fluid with hospital course complicated last night with hypotension high-grade fever for which code sepsis was called patient received 175 0 cc of IV fluid bolus, chest x-ray, blood culture as well as urinalysis and urine culture procalcitonin and lactate following which blood pressure improved from 70s to 90s while patient remaining asymptomatic was found to have SARS-CoV-2 on RVP and with recurrent hypotension medical ICU consulted with persistent normal mentation and patient remained asymptomatic besides left lower quadrant abdominal pain and generalized weakness, denied any dysuria frequency hematuria; denied any further sore throat nausea vomiting dizziness lightheadedness blurring of vision slurring of speech chest pain difficulty breathing cough and other URTI symptoms.    Review of systems:  All systems reviewed with symptoms mentions as above.     Past medical/surgical history:    Family history: Noncontributory pertaining to presenting illness    Social history: has 2 kids, unemployed, denies any alcohol recreational substance or tobacco usage    Allergies: no known drug allergies    Medications: denies taking any medication at home, supposed to be taking thyroid medication    Vital Signs Last 24 Hrs  T(C): 36.7 (03 Jan 2024 04:24), Max: 40.9 (02 Jan 2024 21:55)  T(F): 98.1 (03 Jan 2024 04:24), Max: 105.6 (02 Jan 2024 21:55)  HR: 92 (03 Jan 2024 04:24) (83 - 155)  BP: 78/30 (03 Jan 2024 04:24) (70/30 - 126/90)  BP(mean): 75 (02 Jan 2024 10:26) (75 - 75)  RR: 18 (03 Jan 2024 04:24) (18 - 20)  SpO2: 96% (03 Jan 2024 04:24) (95% - 100%)  Parameters below as of 03 Jan 2024 04:24  Patient On (Oxygen Delivery Method): room air    Physical exam:   23-year-old  female lying in bed in no apparent distress, alert awake oriented x 4, no acute neurological deficit, pupil equally round reactive bilaterally, oral mucosa moist, trachea central, air entry equal bilaterally with no adventitious breath sounds, S1-S2 heard regular rate rhythm, left lower quadrant tenderness without rebound or rigidity with no CVA tenderness with bowel sounds present soft abdomen, no obvious acute skin rash, no pedal edema with 2+ peripheral pulses bilateral lower extremity    Labs:  reviewed  Imaging:  CT Abdomen and Pelvis No Cont (01.01.24): Atrophic kidneys with bilateral indeterminate renal masses, which cannot be further characterized without contrast. Consider contrast enhanced CT or MRI when clinically feasible. Mildly prominent retroperitoneal lymph nodes. Cardiomegaly  CT Head No Cont (01.01.24): No acute intracranial hemorrhage or mass effect.    Cultures/pathology: pending          Plan of care discussed with patient and primary team  Thank you for your consult.   Please call us back with any worsening clinical status or with concerns & questions.   We will Sign Off for now.     Vito Puentes MD   Division of Critical Care Medicine  Department of Medicine   [facility]   Cell [phone]     I have personally provided 70 minutes of critical care time excluding time spent on separate procedures     Assessment/plan:     23-year-old  female with possible kidney disease as well as thyroid disease initially admitted for altered mental status with acute metabolic encephalopathy with acute kidney injury on possible CKD with metabolic acidosis  and probable UTI with hospital course complicated by high-grade fever, hypotension with SARS-CoV-2 systemic viral illness ruling out bacteremia     recommend:  1 L IV fluid bolus with NS/LR   continue with maintenance sodium bicarb fluid at 125 cc/h   repeat lactate ( initial lactate earlier than 90 with initial rapid response negative)   transthoracic echocardiogram   midodrine 10 mg 3 times daily with holding parameters   Solu-Cortef 50 mg IV every 6 hours with cortisol level in the morning   thyroid function test   follow-up on blood cultures and broad-spectrum antibiotics in the meantime   RVP positive for SARS-CoV-2, airborne isolation, not on any supplemental oxygen and saturating well more than 95% without respiratory distress hence would avoid remdesivir with possible MARIVEL on CKD and Solu-Cortef as above supportive treatment  Management of renal failure as per nephrology   during the end of the interview: Patient's systolic improved in 90s with normal mentation with a warm extremities with good urine output    _________________________________    Chief complaint:     altered mental status    HPI/Hospital course:  23-year-old  female who is in her usual state of health presented from [facility] on January 1 with nausea vomiting abdominal pain and altered mental status with possible history of kidney and thyroid disease not taking any medication with sick contact of COVID-19 from her 2-year-old son about a week ago, managed with intramuscular and intravenous benzodiazepine as well as bicarbonate and fluid with hospital course complicated last night with hypotension high-grade fever for which code sepsis was called patient received 175 0 cc of IV fluid bolus, chest x-ray, blood culture as well as urinalysis and urine culture procalcitonin and lactate following which blood pressure improved from 70s to 90s while patient remaining asymptomatic was found to have SARS-CoV-2 on RVP and with recurrent hypotension medical ICU consulted with persistent normal mentation and patient remained asymptomatic besides left lower quadrant abdominal pain and generalized weakness, denied any dysuria frequency hematuria; denied any further sore throat nausea vomiting dizziness lightheadedness blurring of vision slurring of speech chest pain difficulty breathing cough and other URTI symptoms.    Review of systems:  All systems reviewed with symptoms mentions as above.     Past medical/surgical history:    Family history: Noncontributory pertaining to presenting illness    Social history: has 2 kids, unemployed, denies any alcohol recreational substance or tobacco usage    Allergies: no known drug allergies    Medications: denies taking any medication at home, supposed to be taking thyroid medication    Vital Signs Last 24 Hrs  T(C): 36.7 (03 Jan 2024 04:24), Max: 40.9 (02 Jan 2024 21:55)  T(F): 98.1 (03 Jan 2024 04:24), Max: 105.6 (02 Jan 2024 21:55)  HR: 92 (03 Jan 2024 04:24) (83 - 155)  BP: 78/30 (03 Jan 2024 04:24) (70/30 - 126/90)  BP(mean): 75 (02 Jan 2024 10:26) (75 - 75)  RR: 18 (03 Jan 2024 04:24) (18 - 20)  SpO2: 96% (03 Jan 2024 04:24) (95% - 100%)  Parameters below as of 03 Jan 2024 04:24  Patient On (Oxygen Delivery Method): room air    Physical exam:   23-year-old  female lying in bed in no apparent distress, alert awake oriented x 4, no acute neurological deficit, pupil equally round reactive bilaterally, oral mucosa moist, trachea central, air entry equal bilaterally with no adventitious breath sounds, S1-S2 heard regular rate rhythm, left lower quadrant tenderness without rebound or rigidity with no CVA tenderness with bowel sounds present soft abdomen, no obvious acute skin rash, no pedal edema with 2+ peripheral pulses bilateral lower extremity    Labs:  reviewed  Imaging:  CT Abdomen and Pelvis No Cont (01.01.24): Atrophic kidneys with bilateral indeterminate renal masses, which cannot be further characterized without contrast. Consider contrast enhanced CT or MRI when clinically feasible. Mildly prominent retroperitoneal lymph nodes. Cardiomegaly  CT Head No Cont (01.01.24): No acute intracranial hemorrhage or mass effect.    Cultures/pathology: pending          Plan of care discussed with patient and primary team  Thank you for your consult.   Please call us back with any worsening clinical status or with concerns & questions.   We will Sign Off for now.     Vito Puentes MD   Division of Critical Care Medicine  Department of Medicine   [facility]   Cell [phone]     I have personally provided 70 minutes of critical care time excluding time spent on separate procedures

## 2024-01-03 NOTE — PROGRESS NOTE ADULT - ASSESSMENT
24 y/o female whose real name is Trinh Clark. brought from [place] for evaluation.    Patient was apparently in her normal state of health at a [employer] event and shortly after eating rice/beans and meat started c/o abdominal pain and cramping and then vomited. Friends who witnessed it said they called EMS due to patient being very agitated. No one else reportedly was sick from eating the same food. No recent illnesses, travel, or sick contacts reported. Patient has never used illicit drugs and does not use alcohol. She has a hx of "kidney problems" and used to be on medications for it but self discontinued " a while ago." In th field Patient given Versed and then Ativan in ED to allow for exam and labs. ED attending states patient denying any trauma, or assault. No obvious injury on exam. Labs with Hbg of 8.6 and Cr. of 3.95 with reported baseline of 2.5. No reported mental health history.     AMS / Agitation  -No prodrome, related to food ingestion by history  -Panic attack? Pain related?   -CTH neg  -Utox positive for benzo likely given en route to ER  -Back to baseline now   -Monitor     MARIVEL on CKD  -Continue IVF  -Continue Sodium Bicarb   -Nephro recs appreciated    Renal Masses  -MRI Abdomen pending     Anemia  -Likely on basis of CKD  -Low iron stores, started on Venofer   -FOBT pending     COVID-19  -No hypoxia  -Supportive Care    Hypotension  Suspected Adrenal Insufficiency  -Follow Cortisol   -Continue Solu-cortef and Midodrine  -ICU Consult appreciated     Hypocalcemia  -Replete    Cardiac Arrhythmias  -ECHO  -Cardio / EP Consult     DVT Prophylaxis -- Venodyne    Dispo: Home once stable.  24 y/o female whose real name is Trinh Clark. brought from [facility] for evaluation.    Patient was apparently in her normal state of health at a [employer] event and shortly after eating rice/beans and meat started c/o abdominal pain and cramping and then vomited. Friends who witnessed it said they called EMS due to patient being very agitated. No one else reportedly was sick from eating the same food. No recent illnesses, travel, or sick contacts reported. Patient has never used illicit drugs and does not use alcohol. She has a hx of "kidney problems" and used to be on medications for it but self discontinued " a while ago." In th field Patient given Versed and then Ativan in ED to allow for exam and labs. ED attending states patient denying any trauma, or assault. No obvious injury on exam. Labs with Hbg of 8.6 and Cr. of 3.95 with reported baseline of 2.5. No reported mental health history.     AMS / Agitation  -No prodrome, related to food ingestion by history  -Panic attack? Pain related?   -CTH neg  -Utox positive for benzo likely given en route to ER  -Back to baseline now   -Monitor     MARIVEL on CKD  -Continue IVF  -Continue Sodium Bicarb   -Nephro recs appreciated    Renal Masses  -MRI Abdomen pending     Anemia  -Likely on basis of CKD  -Low iron stores, started on Venofer   -FOBT pending     COVID-19  -No hypoxia  -Supportive Care    Hypotension  Suspected Adrenal Insufficiency  -Follow Cortisol   -Continue Solu-cortef and Midodrine  -ICU Consult appreciated     Hypocalcemia  -Replete    Cardiac Arrhythmias  -ECHO  -Cardio / EP Consult     DVT Prophylaxis -- Venodyne    Dispo: Home once stable.

## 2024-01-04 LAB
ALBUMIN SERPL ELPH-MCNC: 3.1 G/DL — LOW (ref 3.3–5.2)
ALBUMIN SERPL ELPH-MCNC: 3.1 G/DL — LOW (ref 3.3–5.2)
ALP SERPL-CCNC: 43 U/L — SIGNIFICANT CHANGE UP (ref 40–120)
ALP SERPL-CCNC: 43 U/L — SIGNIFICANT CHANGE UP (ref 40–120)
ALT FLD-CCNC: 13 U/L — SIGNIFICANT CHANGE UP
ALT FLD-CCNC: 13 U/L — SIGNIFICANT CHANGE UP
ANION GAP SERPL CALC-SCNC: 17 MMOL/L — SIGNIFICANT CHANGE UP (ref 5–17)
ANION GAP SERPL CALC-SCNC: 17 MMOL/L — SIGNIFICANT CHANGE UP (ref 5–17)
AST SERPL-CCNC: 16 U/L — SIGNIFICANT CHANGE UP
AST SERPL-CCNC: 16 U/L — SIGNIFICANT CHANGE UP
BILIRUB SERPL-MCNC: <0.2 MG/DL — LOW (ref 0.4–2)
BILIRUB SERPL-MCNC: <0.2 MG/DL — LOW (ref 0.4–2)
BUN SERPL-MCNC: 24.9 MG/DL — HIGH (ref 8–20)
BUN SERPL-MCNC: 24.9 MG/DL — HIGH (ref 8–20)
CALCIUM SERPL-MCNC: 8.9 MG/DL — SIGNIFICANT CHANGE UP (ref 8.4–10.5)
CALCIUM SERPL-MCNC: 8.9 MG/DL — SIGNIFICANT CHANGE UP (ref 8.4–10.5)
CHLORIDE SERPL-SCNC: 109 MMOL/L — HIGH (ref 96–108)
CHLORIDE SERPL-SCNC: 109 MMOL/L — HIGH (ref 96–108)
CO2 SERPL-SCNC: 17 MMOL/L — LOW (ref 22–29)
CO2 SERPL-SCNC: 17 MMOL/L — LOW (ref 22–29)
CORTIS AM PEAK SERPL-MCNC: 74.5 UG/DL — HIGH (ref 6–18.4)
CORTIS AM PEAK SERPL-MCNC: 74.5 UG/DL — HIGH (ref 6–18.4)
CREAT SERPL-MCNC: 3.45 MG/DL — HIGH (ref 0.5–1.3)
CREAT SERPL-MCNC: 3.45 MG/DL — HIGH (ref 0.5–1.3)
EGFR: 17 ML/MIN/1.73M2 — LOW
EGFR: 17 ML/MIN/1.73M2 — LOW
GLUCOSE SERPL-MCNC: 117 MG/DL — HIGH (ref 70–99)
GLUCOSE SERPL-MCNC: 117 MG/DL — HIGH (ref 70–99)
HCT VFR BLD CALC: 25.7 % — LOW (ref 34.5–45)
HCT VFR BLD CALC: 25.7 % — LOW (ref 34.5–45)
HGB BLD-MCNC: 7.8 G/DL — LOW (ref 11.5–15.5)
HGB BLD-MCNC: 7.8 G/DL — LOW (ref 11.5–15.5)
MCHC RBC-ENTMCNC: 26.6 PG — LOW (ref 27–34)
MCHC RBC-ENTMCNC: 26.6 PG — LOW (ref 27–34)
MCHC RBC-ENTMCNC: 30.4 GM/DL — LOW (ref 32–36)
MCHC RBC-ENTMCNC: 30.4 GM/DL — LOW (ref 32–36)
MCV RBC AUTO: 87.7 FL — SIGNIFICANT CHANGE UP (ref 80–100)
MCV RBC AUTO: 87.7 FL — SIGNIFICANT CHANGE UP (ref 80–100)
OB PNL STL: NEGATIVE — SIGNIFICANT CHANGE UP
OB PNL STL: NEGATIVE — SIGNIFICANT CHANGE UP
PLATELET # BLD AUTO: 273 K/UL — SIGNIFICANT CHANGE UP (ref 150–400)
PLATELET # BLD AUTO: 273 K/UL — SIGNIFICANT CHANGE UP (ref 150–400)
POTASSIUM SERPL-MCNC: 4 MMOL/L — SIGNIFICANT CHANGE UP (ref 3.5–5.3)
POTASSIUM SERPL-MCNC: 4 MMOL/L — SIGNIFICANT CHANGE UP (ref 3.5–5.3)
POTASSIUM SERPL-SCNC: 4 MMOL/L — SIGNIFICANT CHANGE UP (ref 3.5–5.3)
POTASSIUM SERPL-SCNC: 4 MMOL/L — SIGNIFICANT CHANGE UP (ref 3.5–5.3)
PROT SERPL-MCNC: 5.9 G/DL — LOW (ref 6.6–8.7)
PROT SERPL-MCNC: 5.9 G/DL — LOW (ref 6.6–8.7)
RBC # BLD: 2.93 M/UL — LOW (ref 3.8–5.2)
RBC # BLD: 2.93 M/UL — LOW (ref 3.8–5.2)
RBC # FLD: 15.9 % — HIGH (ref 10.3–14.5)
RBC # FLD: 15.9 % — HIGH (ref 10.3–14.5)
SODIUM SERPL-SCNC: 143 MMOL/L — SIGNIFICANT CHANGE UP (ref 135–145)
SODIUM SERPL-SCNC: 143 MMOL/L — SIGNIFICANT CHANGE UP (ref 135–145)
WBC # BLD: 13.13 K/UL — HIGH (ref 3.8–10.5)
WBC # BLD: 13.13 K/UL — HIGH (ref 3.8–10.5)
WBC # FLD AUTO: 13.13 K/UL — HIGH (ref 3.8–10.5)
WBC # FLD AUTO: 13.13 K/UL — HIGH (ref 3.8–10.5)

## 2024-01-04 PROCEDURE — 99223 1ST HOSP IP/OBS HIGH 75: CPT

## 2024-01-04 PROCEDURE — 99233 SBSQ HOSP IP/OBS HIGH 50: CPT

## 2024-01-04 RX ORDER — MIDODRINE HYDROCHLORIDE 2.5 MG/1
5 TABLET ORAL THREE TIMES A DAY
Refills: 0 | Status: DISCONTINUED | OUTPATIENT
Start: 2024-01-04 | End: 2024-01-05

## 2024-01-04 RX ORDER — CALCIUM CARBONATE 500(1250)
1 TABLET ORAL
Refills: 0 | Status: DISCONTINUED | OUTPATIENT
Start: 2024-01-04 | End: 2024-01-12

## 2024-01-04 RX ORDER — CALCITRIOL 0.5 UG/1
0.25 CAPSULE ORAL DAILY
Refills: 0 | Status: DISCONTINUED | OUTPATIENT
Start: 2024-01-04 | End: 2024-01-12

## 2024-01-04 RX ORDER — HYDROCORTISONE 20 MG
50 TABLET ORAL EVERY 8 HOURS
Refills: 0 | Status: DISCONTINUED | OUTPATIENT
Start: 2024-01-04 | End: 2024-01-05

## 2024-01-04 RX ADMIN — Medication 50 MILLIGRAM(S): at 06:00

## 2024-01-04 RX ADMIN — Medication 1 MILLIGRAM(S): at 11:33

## 2024-01-04 RX ADMIN — Medication 50 MILLIGRAM(S): at 11:32

## 2024-01-04 RX ADMIN — MIDODRINE HYDROCHLORIDE 10 MILLIGRAM(S): 2.5 TABLET ORAL at 05:58

## 2024-01-04 RX ADMIN — Medication 50 MILLIGRAM(S): at 22:35

## 2024-01-04 RX ADMIN — MIDODRINE HYDROCHLORIDE 10 MILLIGRAM(S): 2.5 TABLET ORAL at 11:33

## 2024-01-04 RX ADMIN — Medication 1 PACKET(S): at 05:59

## 2024-01-04 RX ADMIN — Medication 200 GRAM(S): at 00:52

## 2024-01-04 RX ADMIN — Medication 50 MILLIGRAM(S): at 00:51

## 2024-01-04 RX ADMIN — Medication 1300 MILLIGRAM(S): at 05:59

## 2024-01-04 RX ADMIN — PANTOPRAZOLE SODIUM 40 MILLIGRAM(S): 20 TABLET, DELAYED RELEASE ORAL at 06:01

## 2024-01-04 RX ADMIN — Medication 1 TABLET(S): at 11:33

## 2024-01-04 RX ADMIN — IRON SUCROSE 110 MILLIGRAM(S): 20 INJECTION, SOLUTION INTRAVENOUS at 15:33

## 2024-01-04 RX ADMIN — PIPERACILLIN AND TAZOBACTAM 25 GRAM(S): 4; .5 INJECTION, POWDER, LYOPHILIZED, FOR SOLUTION INTRAVENOUS at 05:59

## 2024-01-04 RX ADMIN — Medication 1300 MILLIGRAM(S): at 13:15

## 2024-01-04 RX ADMIN — Medication 1 TABLET(S): at 17:14

## 2024-01-04 RX ADMIN — Medication 1300 MILLIGRAM(S): at 22:35

## 2024-01-04 RX ADMIN — MIDODRINE HYDROCHLORIDE 10 MILLIGRAM(S): 2.5 TABLET ORAL at 17:15

## 2024-01-04 RX ADMIN — SODIUM CHLORIDE 3 MILLILITER(S): 9 INJECTION INTRAMUSCULAR; INTRAVENOUS; SUBCUTANEOUS at 23:47

## 2024-01-04 RX ADMIN — SODIUM CHLORIDE 82 MILLILITER(S): 9 INJECTION, SOLUTION INTRAVENOUS at 06:00

## 2024-01-04 RX ADMIN — PIPERACILLIN AND TAZOBACTAM 25 GRAM(S): 4; .5 INJECTION, POWDER, LYOPHILIZED, FOR SOLUTION INTRAVENOUS at 17:45

## 2024-01-04 RX ADMIN — Medication 1 PACKET(S): at 17:11

## 2024-01-04 RX ADMIN — SODIUM CHLORIDE 3 MILLILITER(S): 9 INJECTION INTRAMUSCULAR; INTRAVENOUS; SUBCUTANEOUS at 05:59

## 2024-01-04 RX ADMIN — CALCITRIOL 0.25 MICROGRAM(S): 0.5 CAPSULE ORAL at 11:32

## 2024-01-04 RX ADMIN — SODIUM CHLORIDE 3 MILLILITER(S): 9 INJECTION INTRAMUSCULAR; INTRAVENOUS; SUBCUTANEOUS at 13:13

## 2024-01-04 NOTE — PROGRESS NOTE ADULT - ASSESSMENT
24 y/o female whose real name is Theresa Tsang. brought from [facility] for evaluation.    Patient was apparently in her normal state of health at a [employer] event and shortly after eating rice/beans and meat started c/o abdominal pain and cramping and then vomited. Friends who witnessed it said they called EMS due to patient being very agitated. No one else reportedly was sick from eating the same food. No recent illnesses, travel, or sick contacts reported. Patient has never used illicit drugs and does not use alcohol. She has a hx of "kidney problems" and used to be on medications for it but self discontinued " a while ago." In th field Patient given Versed and then Ativan in ED to allow for exam and labs. ED attending states patient denying any trauma, or assault. No obvious injury on exam. Labs with Hbg of 8.6 and Cr. of 3.95 with reported baseline of 2.5. No reported mental health history.     Hypotension  Suspected Adrenal Insufficiency  -Follow Cortisol however given patient was on steroids, results inaccurate   Endocrine consulted, appreciate recs, Hydrocortisone adjusted to 50 mg q8   -Continue Solu-cortef and Midodrine  Will have hold parameters for Midodrine < 110 systolic BP  -ICU Consult appreciated    AMS / Agitation  Resolved   -No prodrome, related to food ingestion by history  -Panic attack? Pain related?   -CTH neg  -Utox positive for benzo likely given en route to ER  -Back to baseline now   -Monitor     MARIVEL on CKD  -Continue IVF  -Continue Sodium Bicarb   -Nephro recs appreciated, patient appears stable from renal perspective     Renal Masses  -MRI Abdomen pending   Prefer completion inpatient given concern for poor follow up     Anemia  -Likely on basis of CKD  -Low iron stores, started on Venofer   -FOBT pending     COVID-19  -No hypoxia  -Supportive Care     Hypocalcemia  -Replete    Cardiac Arrhythmias  - ECHO  - continue to monitor on tele, noted episode of bradycardia to 47 bpm     DVT Prophylaxis -- Venodyne  Dispo: Home once stable.    24 y/o female whose real name is Theresa Tsang. brought from [facility] for evaluation.    Patient was apparently in her normal state of health at a [employer] event and shortly after eating rice/beans and meat started c/o abdominal pain and cramping and then vomited. Friends who witnessed it said they called EMS due to patient being very agitated. No one else reportedly was sick from eating the same food. No recent illnesses, travel, or sick contacts reported. Patient has never used illicit drugs and does not use alcohol. She has a hx of "kidney problems" and used to be on medications for it but self discontinued " a while ago." In th field Patient given Versed and then Ativan in ED to allow for exam and labs. ED attending states patient denying any trauma, or assault. No obvious injury on exam. Labs with Hbg of 8.6 and Cr. of 3.95 with reported baseline of 2.5. No reported mental health history.     Hypotension  Suspected Adrenal Insufficiency  -Follow Cortisol however given patient was on steroids, results inaccurate   Endocrine consulted, appreciate recs, Hydrocortisone adjusted to 50 mg q8   -Continue Solu-cortef and Midodrine  Will have hold parameters for Midodrine < 110 systolic BP  -ICU Consult appreciated    AMS / Agitation  Resolved   -No prodrome, related to food ingestion by history  -Panic attack? Pain related?   -CTH neg  -Utox positive for benzo likely given en route to ER  -Back to baseline now   -Monitor     MARIVEL on CKD  -Continue IVF  -Continue Sodium Bicarb   -Nephro recs appreciated, patient appears stable from renal perspective     Renal Masses  -MRI Abdomen pending   Prefer completion inpatient given concern for poor follow up     Anemia  -Likely on basis of CKD  -Low iron stores, started on Venofer   -FOBT pending     COVID-19  -No hypoxia  -Supportive Care     Hypocalcemia  -Replete    Cardiac Arrhythmias  - ECHO  - continue to monitor on tele, noted episode of bradycardia to 47 bpm     DVT Prophylaxis -- Venodyne  Dispo: Home once stable.

## 2024-01-04 NOTE — CONSULT NOTE ADULT - ASSESSMENT
23F Filipino speaking, w/ hx of hyperthyroidism and ckd brought in for AMS at a [employer] event. Patient was apparently in her normal state of health at a [employer] event and shortly after eating rice/beans and meat started c/o abdominal pain and cramping and then vomited. Friends who witnessed it said they called EMS due to patient being very agitated. No hx of drug use. She has a hx of "kidney problems" and used to be on medications for it but self discontinued " a while ago." In the field Patient given Versed and then Ativan in ER to allow for exam and labs. Labs with Hbg of 8.6 and Cr. of 3.95 with reported baseline of 2.5. No reported mental health history. Admitted for acute metabolic encephalopathy with MARIVEL on CKD. Found to have COVID  Course complicated by high grade fever, hypotension  Consult for adrenal insufficiency    Hypotension  -likely multifactorial in the setting of MARIVEL on CKD/COVID infection/anemia  -unclear if patient has AI but already on stress dose steroids  -patient will need to have formal evaluation off of steroids  -since BP is normotensive, can try to taper off steroids for acth stim test  -cortisol done on 1/4/24 is inaccurate as patient had been already receiving hydrocortisone  -cortisol am will only be accurate off of steroids for at least 12-24 hours  -change to hydrocortisone 50Q8    hx of Hyperthyroidism- tsh normal. check thyroid ultrasound. check TSI and TRAb and TPO/TG Ab    MARIVEL on CKD- followed by renal    COVID+ -care per primary team 23F Lithuanian speaking, w/ hx of hyperthyroidism and ckd brought in for AMS at a [employer] event. Patient was apparently in her normal state of health at a [employer] event and shortly after eating rice/beans and meat started c/o abdominal pain and cramping and then vomited. Friends who witnessed it said they called EMS due to patient being very agitated. No hx of drug use. She has a hx of "kidney problems" and used to be on medications for it but self discontinued " a while ago." In the field Patient given Versed and then Ativan in ER to allow for exam and labs. Labs with Hbg of 8.6 and Cr. of 3.95 with reported baseline of 2.5. No reported mental health history. Admitted for acute metabolic encephalopathy with MARIVEL on CKD. Found to have COVID  Course complicated by high grade fever, hypotension  Consult for adrenal insufficiency    Hypotension  -likely multifactorial in the setting of MARIVEL on CKD/COVID infection/anemia  -unclear if patient has AI but already on stress dose steroids  -patient will need to have formal evaluation off of steroids  -since BP is normotensive, can try to taper off steroids for acth stim test  -cortisol done on 1/4/24 is inaccurate as patient had been already receiving hydrocortisone  -cortisol am will only be accurate off of steroids for at least 12-24 hours  -change to hydrocortisone 50Q8    hx of Hyperthyroidism- tsh normal. check thyroid ultrasound. check TSI and TRAb and TPO/TG Ab    MARIVEL on CKD- followed by renal    COVID+ -care per primary team 23F British speaking, w/ hx of thyroid issues and ckd brought in for AMS at a [employer] event. Patient was apparently in her normal state of health at a [employer] event and shortly after eating rice/beans and meat started c/o abdominal pain and cramping and then vomited. Friends who witnessed it said they called EMS due to patient being very agitated. No hx of drug use. She has a hx of "kidney problems" and used to be on medications for it but self discontinued " a while ago." In the field Patient given Versed and then Ativan in ER to allow for exam and labs. Labs with Hbg of 8.6 and Cr. of 3.95 with reported baseline of 2.5. No reported mental health history. Admitted for acute metabolic encephalopathy with MARIVEL on CKD. Found to have COVID  Course complicated by high grade fever, hypotension  Consult for adrenal insufficiency    Hypotension  -likely multifactorial in the setting of MARIVEL on CKD/COVID infection/anemia  -unclear if patient has AI but already on stress dose steroids  -patient will need to have formal evaluation off of steroids  -since BP is normotensive, can try to taper off steroids for acth stim test  -cortisol done on 1/4/24 is inaccurate as patient had been already receiving hydrocortisone  -cortisol am will only be accurate off of steroids for at least 12-24 hours  -change to hydrocortisone 50Q8    hx of thyroid issues- based on the limited amount of info, patient likely has hypothyroidism. tsh normal. check thyroid ultrasound. check TSI and TRAb and TPO/TG Ab. check other tfts    MARIVEL on CKD- followed by renal    COVID+ -care per primary team 23F Burundian speaking, w/ hx of thyroid issues and ckd brought in for AMS at a [employer] event. Patient was apparently in her normal state of health at a [employer] event and shortly after eating rice/beans and meat started c/o abdominal pain and cramping and then vomited. Friends who witnessed it said they called EMS due to patient being very agitated. No hx of drug use. She has a hx of "kidney problems" and used to be on medications for it but self discontinued " a while ago." In the field Patient given Versed and then Ativan in ER to allow for exam and labs. Labs with Hbg of 8.6 and Cr. of 3.95 with reported baseline of 2.5. No reported mental health history. Admitted for acute metabolic encephalopathy with MARIVEL on CKD. Found to have COVID  Course complicated by high grade fever, hypotension  Consult for adrenal insufficiency    Hypotension  -likely multifactorial in the setting of MARIVEL on CKD/COVID infection/anemia  -unclear if patient has AI but already on stress dose steroids  -patient will need to have formal evaluation off of steroids  -since BP is normotensive, can try to taper off steroids for acth stim test  -cortisol done on 1/4/24 is inaccurate as patient had been already receiving hydrocortisone  -cortisol am will only be accurate off of steroids for at least 12-24 hours  -change to hydrocortisone 50Q8    hx of thyroid issues- based on the limited amount of info, patient likely has hypothyroidism. tsh normal. check thyroid ultrasound. check TSI and TRAb and TPO/TG Ab. check other tfts    MARIVEL on CKD- followed by renal    COVID+ -care per primary team

## 2024-01-04 NOTE — DIETITIAN INITIAL EVALUATION ADULT - OTHER INFO
24 y/o female whose real name is Theresa Tsang. brought from [place] for evaluation.    Patient was apparently in her normal state of health at a [employer] event and shortly after eating rice/beans and meat started c/o abdominal pain and cramping and then vomited. Friends who witnessed it said they called EMS due to patient being very agitated. No one else reportedly was sick from eating the same food. No recent illnesses, travel, or sick contacts reported. Patient has never used illicit drugs and does not use alcohol. She has a hx of "kidney problems" and used to be on medications for it but self discontinued " a while ago." In th field Patient given Versed and then Ativan in ED to allow for exam and labs. ED attending states patient denying any trauma, or assault. No obvious injury on exam. Labs with Hbg of 8.6 and Cr. of 3.95 with reported baseline of 2.5. No reported mental health history.    24 y/o female whose real name is Theresa Tsang. brought from [facility] for evaluation.    Patient was apparently in her normal state of health at a [employer] event and shortly after eating rice/beans and meat started c/o abdominal pain and cramping and then vomited. Friends who witnessed it said they called EMS due to patient being very agitated. No one else reportedly was sick from eating the same food. No recent illnesses, travel, or sick contacts reported. Patient has never used illicit drugs and does not use alcohol. She has a hx of "kidney problems" and used to be on medications for it but self discontinued " a while ago." In th field Patient given Versed and then Ativan in ED to allow for exam and labs. ED attending states patient denying any trauma, or assault. No obvious injury on exam. Labs with Hbg of 8.6 and Cr. of 3.95 with reported baseline of 2.5. No reported mental health history.

## 2024-01-04 NOTE — DIETITIAN INITIAL EVALUATION ADULT - PERTINENT LABORATORY DATA
01-04    143  |  109<H>  |  24.9<H>  ----------------------------<  117<H>  4.0   |  17.0<L>  |  3.45<H>    Ca    8.9      04 Jan 2024 06:34  Phos  2.1     01-03    TPro  5.9<L>  /  Alb  3.1<L>  /  TBili  <0.2<L>  /  DBili  x   /  AST  16  /  ALT  13  /  AlkPhos  43  01-04

## 2024-01-04 NOTE — DIETITIAN INITIAL EVALUATION ADULT - ORAL INTAKE PTA/DIET HISTORY
Pt in the rest room at time of visit x 2. Subjective interview / education deferred. Per  Aid pt is tolerating Diet. PO 50%

## 2024-01-04 NOTE — DIETITIAN INITIAL EVALUATION ADULT - NS FNS DIET ORDER
Diet, Renal Restrictions:   For patients receiving Renal Replacement - No Protein Restr, No Conc K, No Conc Phos, Low Sodium (01-02-24 @ 07:47)

## 2024-01-04 NOTE — PROGRESS NOTE ADULT - SUBJECTIVE AND OBJECTIVE BOX
NEPHROLOGY INTERVAL HPI/OVERNIGHT EVENTS:    No new events.    MEDICATIONS  (STANDING):  enoxaparin Injectable 30 milliGRAM(s) SubCutaneous every 24 hours  folic acid 1 milliGRAM(s) Oral daily  iron sucrose IVPB 200 milliGRAM(s) IV Intermittent every 24 hours  multivitamin 1 Tablet(s) Oral daily  sodium bicarbonate 650 milliGRAM(s) Oral every 8 hours  sodium chloride 0.45% 1000 milliLiter(s) (125 mL/Hr) IV Continuous <Continuous>  sodium chloride 0.9% lock flush 3 milliLiter(s) IV Push every 8 hours    MEDICATIONS  (PRN):  acetaminophen     Tablet .. 650 milliGRAM(s) Oral every 6 hours PRN Temp greater or equal to 38C (100.4F), Mild Pain (1 - 3)  aluminum hydroxide/magnesium hydroxide/simethicone Suspension 30 milliLiter(s) Oral every 4 hours PRN Dyspepsia  LORazepam   Injectable 1 milliGRAM(s) IV Push every 8 hours PRN Agitation  melatonin 3 milliGRAM(s) Oral at bedtime PRN Insomnia  ondansetron Injectable 4 milliGRAM(s) IV Push every 8 hours PRN Nausea and/or Vomiting      Allergies    No Known Allergies      Vital Signs Last 24 Hrs  T(C): 37 (04 Jan 2024 04:27), Max: 37 (03 Jan 2024 15:00)  T(F): 98.6 (04 Jan 2024 04:27), Max: 98.6 (03 Jan 2024 15:00)  HR: 75 (04 Jan 2024 05:23) (60 - 88)  BP: 110/62 (04 Jan 2024 05:23) (97/60 - 118/69)  BP(mean): --  RR: 18 (04 Jan 2024 04:27) (17 - 18)  SpO2: 98% (04 Jan 2024 04:27) (96% - 100%)    Parameters below as of 04 Jan 2024 04:27  Patient On (Oxygen Delivery Method): room air      T(C): 37 (03 Jan 2024 08:34), Max: 40.9 (02 Jan 2024 21:55)  T(F): 98.6 (03 Jan 2024 08:34), Max: 105.6 (02 Jan 2024 21:55)  HR: 63 (03 Jan 2024 08:34) (63 - 155)  BP: 102/69 (03 Jan 2024 08:34) (70/30 - 126/90)  BP(mean): 75 (02 Jan 2024 10:26) (75 - 75)  RR: 18 (03 Jan 2024 08:34) (18 - 20)  SpO2: 95% (03 Jan 2024 08:34) (95% - 100%)    Parameters below as of 03 Jan 2024 06:30  Patient On (Oxygen Delivery Method): room air      T(C): 36.8 (02 Jan 2024 10:26), Max: 36.8 (02 Jan 2024 01:02)  T(F): 98.3 (02 Jan 2024 10:26), Max: 98.3 (02 Jan 2024 10:26)  HR: 94 (02 Jan 2024 10:26) (81 - 94)  BP: 99/64 (02 Jan 2024 10:26) (89/56 - 105/58)  BP(mean): 75 (02 Jan 2024 10:26) (75 - 75)  RR: 18 (02 Jan 2024 10:26) (18 - 18)  SpO2: 95% (02 Jan 2024 10:26) (95% - 100%)    Parameters below as of 02 Jan 2024 10:26  Patient On (Oxygen Delivery Method): room air      PHYSICAL EXAM:    GENERAL: Comfortable in bed   HEAD: NCAT  EYES: EOMI  NECK: Supple  NERVOUS SYSTEM:  Alert & Oriented , Frisian speaking  CHEST/LUNG: Clear to percussion bilaterally  HEART: Regular rate and rhythm  ABDOMEN: Soft, Nontender, Nondistended; +BS  EXTREMITIES: no edema  : Neg      LABS:    01-04    143  |  109<H>  |  24.9<H>  ----------------------------<  117<H>  4.0   |  17.0<L>  |  3.45<H>    Ca    8.9      04 Jan 2024 06:34  Phos  2.1     01-03    TPro  5.9<L>  /  Alb  3.1<L>  /  TBili  <0.2<L>  /  DBili  x   /  AST  16  /  ALT  13  /  AlkPhos  43  01-04      01-03    145  |  118<H>  |  30.1<H>  ----------------------------<  128<H>  3.9   |  18.0<L>  |  3.53<H>    Ca    7.2<L>      03 Jan 2024 06:42  Phos  2.1     01-03  Mg     2.1     01-02    TPro  6.8  /  Alb  3.8  /  TBili  0.3<L>  /  DBili  x   /  AST  15  /  ALT  8   /  AlkPhos  63  01-02                          8.6    5.52  )-----------( 257      ( 01 Jan 2024 03:50 )             26.9     01-02    142  |  108  |  27.2<H>  ----------------------------<  64<L>  3.8   |  16.0<L>  |  3.60<H>    Ca    8.6      02 Jan 2024 02:49  Phos  3.8     01-02  Mg     2.1     01-02    TPro  7.1  /  Alb  3.7  /  TBili  0.3<L>  /  DBili  x   /  AST  13  /  ALT  7   /  AlkPhos  58  01-02      Urinalysis Basic - ( 02 Jan 2024 02:49 )    Color: x / Appearance: x / SG: x / pH: x  Gluc: 64 mg/dL / Ketone: x  / Bili: x / Urobili: x   Blood: x / Protein: x / Nitrite: x   Leuk Esterase: x / RBC: x / WBC x   Sq Epi: x / Non Sq Epi: x / Bacteria: x      Magnesium: 2.1 mg/dL (01-02 @ 02:49)  Phosphorus: 3.8 mg/dL (01-02 @ 02:49)  Intact PTH: 73 pg/mL (01-02 @ 02:49)  C3 Complement, Serum: 146 mg/dL (01-02 @ 02:49)  C4 Complement, Serum: 39 mg/dL (01-02 @ 02:49)          RADIOLOGY & ADDITIONAL TESTS:   NEPHROLOGY INTERVAL HPI/OVERNIGHT EVENTS:    No new events.    MEDICATIONS  (STANDING):  enoxaparin Injectable 30 milliGRAM(s) SubCutaneous every 24 hours  folic acid 1 milliGRAM(s) Oral daily  iron sucrose IVPB 200 milliGRAM(s) IV Intermittent every 24 hours  multivitamin 1 Tablet(s) Oral daily  sodium bicarbonate 650 milliGRAM(s) Oral every 8 hours  sodium chloride 0.45% 1000 milliLiter(s) (125 mL/Hr) IV Continuous <Continuous>  sodium chloride 0.9% lock flush 3 milliLiter(s) IV Push every 8 hours    MEDICATIONS  (PRN):  acetaminophen     Tablet .. 650 milliGRAM(s) Oral every 6 hours PRN Temp greater or equal to 38C (100.4F), Mild Pain (1 - 3)  aluminum hydroxide/magnesium hydroxide/simethicone Suspension 30 milliLiter(s) Oral every 4 hours PRN Dyspepsia  LORazepam   Injectable 1 milliGRAM(s) IV Push every 8 hours PRN Agitation  melatonin 3 milliGRAM(s) Oral at bedtime PRN Insomnia  ondansetron Injectable 4 milliGRAM(s) IV Push every 8 hours PRN Nausea and/or Vomiting      Allergies    No Known Allergies      Vital Signs Last 24 Hrs  T(C): 37 (04 Jan 2024 04:27), Max: 37 (03 Jan 2024 15:00)  T(F): 98.6 (04 Jan 2024 04:27), Max: 98.6 (03 Jan 2024 15:00)  HR: 75 (04 Jan 2024 05:23) (60 - 88)  BP: 110/62 (04 Jan 2024 05:23) (97/60 - 118/69)  BP(mean): --  RR: 18 (04 Jan 2024 04:27) (17 - 18)  SpO2: 98% (04 Jan 2024 04:27) (96% - 100%)    Parameters below as of 04 Jan 2024 04:27  Patient On (Oxygen Delivery Method): room air      T(C): 37 (03 Jan 2024 08:34), Max: 40.9 (02 Jan 2024 21:55)  T(F): 98.6 (03 Jan 2024 08:34), Max: 105.6 (02 Jan 2024 21:55)  HR: 63 (03 Jan 2024 08:34) (63 - 155)  BP: 102/69 (03 Jan 2024 08:34) (70/30 - 126/90)  BP(mean): 75 (02 Jan 2024 10:26) (75 - 75)  RR: 18 (03 Jan 2024 08:34) (18 - 20)  SpO2: 95% (03 Jan 2024 08:34) (95% - 100%)    Parameters below as of 03 Jan 2024 06:30  Patient On (Oxygen Delivery Method): room air      T(C): 36.8 (02 Jan 2024 10:26), Max: 36.8 (02 Jan 2024 01:02)  T(F): 98.3 (02 Jan 2024 10:26), Max: 98.3 (02 Jan 2024 10:26)  HR: 94 (02 Jan 2024 10:26) (81 - 94)  BP: 99/64 (02 Jan 2024 10:26) (89/56 - 105/58)  BP(mean): 75 (02 Jan 2024 10:26) (75 - 75)  RR: 18 (02 Jan 2024 10:26) (18 - 18)  SpO2: 95% (02 Jan 2024 10:26) (95% - 100%)    Parameters below as of 02 Jan 2024 10:26  Patient On (Oxygen Delivery Method): room air      PHYSICAL EXAM:    GENERAL: Comfortable in bed   HEAD: NCAT  EYES: EOMI  NECK: Supple  NERVOUS SYSTEM:  Alert & Oriented , Albanian speaking  CHEST/LUNG: Clear to percussion bilaterally  HEART: Regular rate and rhythm  ABDOMEN: Soft, Nontender, Nondistended; +BS  EXTREMITIES: no edema  : Neg      LABS:    01-04    143  |  109<H>  |  24.9<H>  ----------------------------<  117<H>  4.0   |  17.0<L>  |  3.45<H>    Ca    8.9      04 Jan 2024 06:34  Phos  2.1     01-03    TPro  5.9<L>  /  Alb  3.1<L>  /  TBili  <0.2<L>  /  DBili  x   /  AST  16  /  ALT  13  /  AlkPhos  43  01-04      01-03    145  |  118<H>  |  30.1<H>  ----------------------------<  128<H>  3.9   |  18.0<L>  |  3.53<H>    Ca    7.2<L>      03 Jan 2024 06:42  Phos  2.1     01-03  Mg     2.1     01-02    TPro  6.8  /  Alb  3.8  /  TBili  0.3<L>  /  DBili  x   /  AST  15  /  ALT  8   /  AlkPhos  63  01-02                          8.6    5.52  )-----------( 257      ( 01 Jan 2024 03:50 )             26.9     01-02    142  |  108  |  27.2<H>  ----------------------------<  64<L>  3.8   |  16.0<L>  |  3.60<H>    Ca    8.6      02 Jan 2024 02:49  Phos  3.8     01-02  Mg     2.1     01-02    TPro  7.1  /  Alb  3.7  /  TBili  0.3<L>  /  DBili  x   /  AST  13  /  ALT  7   /  AlkPhos  58  01-02      Urinalysis Basic - ( 02 Jan 2024 02:49 )    Color: x / Appearance: x / SG: x / pH: x  Gluc: 64 mg/dL / Ketone: x  / Bili: x / Urobili: x   Blood: x / Protein: x / Nitrite: x   Leuk Esterase: x / RBC: x / WBC x   Sq Epi: x / Non Sq Epi: x / Bacteria: x      Magnesium: 2.1 mg/dL (01-02 @ 02:49)  Phosphorus: 3.8 mg/dL (01-02 @ 02:49)  Intact PTH: 73 pg/mL (01-02 @ 02:49)  C3 Complement, Serum: 146 mg/dL (01-02 @ 02:49)  C4 Complement, Serum: 39 mg/dL (01-02 @ 02:49)          RADIOLOGY & ADDITIONAL TESTS:

## 2024-01-04 NOTE — DIETITIAN INITIAL EVALUATION ADULT - PERTINENT MEDS FT
MEDICATIONS  (STANDING):  acetaminophen   IVPB .. 1000 milliGRAM(s) IV Intermittent once  calcitriol   Capsule 0.25 MICROGram(s) Oral daily  calcium carbonate    500 mG (Tums) Chewable 1 Tablet(s) Chew two times a day  epoetin dev (PROCRIT) Injectable 91568 Unit(s) SubCutaneous every 7 days  folic acid 1 milliGRAM(s) Oral daily  hydrocortisone sodium succinate Injectable 50 milliGRAM(s) IV Push every 8 hours  iron sucrose IVPB 200 milliGRAM(s) IV Intermittent every 24 hours  midodrine. 10 milliGRAM(s) Oral three times a day  midodrine. 10 milliGRAM(s) Oral once  multivitamin 1 Tablet(s) Oral daily  pantoprazole    Tablet 40 milliGRAM(s) Oral before breakfast  piperacillin/tazobactam IVPB.. 3.375 Gram(s) IV Intermittent every 12 hours  potassium phosphate / sodium phosphate Powder (PHOS-NaK) 1 Packet(s) Oral two times a day  sodium bicarbonate 1300 milliGRAM(s) Oral three times a day  sodium chloride 0.9% lock flush 3 milliLiter(s) IV Push every 8 hours    MEDICATIONS  (PRN):  acetaminophen     Tablet .. 650 milliGRAM(s) Oral every 6 hours PRN Temp greater or equal to 38C (100.4F), Mild Pain (1 - 3)  albuterol    90 MICROgram(s) HFA Inhaler 2 Puff(s) Inhalation every 6 hours PRN Shortness of Breath and/or Wheezing  aluminum hydroxide/magnesium hydroxide/simethicone Suspension 30 milliLiter(s) Oral every 4 hours PRN Dyspepsia  guaiFENesin Oral Liquid (Sugar-Free) 100 milliGRAM(s) Oral every 6 hours PRN Cough  LORazepam   Injectable 1 milliGRAM(s) IV Push every 8 hours PRN Agitation  melatonin 3 milliGRAM(s) Oral at bedtime PRN Insomnia  ondansetron Injectable 4 milliGRAM(s) IV Push every 8 hours PRN Nausea and/or Vomiting   MEDICATIONS  (STANDING):  acetaminophen   IVPB .. 1000 milliGRAM(s) IV Intermittent once  calcitriol   Capsule 0.25 MICROGram(s) Oral daily  calcium carbonate    500 mG (Tums) Chewable 1 Tablet(s) Chew two times a day  epoetin dev (PROCRIT) Injectable 99127 Unit(s) SubCutaneous every 7 days  folic acid 1 milliGRAM(s) Oral daily  hydrocortisone sodium succinate Injectable 50 milliGRAM(s) IV Push every 8 hours  iron sucrose IVPB 200 milliGRAM(s) IV Intermittent every 24 hours  midodrine. 10 milliGRAM(s) Oral three times a day  midodrine. 10 milliGRAM(s) Oral once  multivitamin 1 Tablet(s) Oral daily  pantoprazole    Tablet 40 milliGRAM(s) Oral before breakfast  piperacillin/tazobactam IVPB.. 3.375 Gram(s) IV Intermittent every 12 hours  potassium phosphate / sodium phosphate Powder (PHOS-NaK) 1 Packet(s) Oral two times a day  sodium bicarbonate 1300 milliGRAM(s) Oral three times a day  sodium chloride 0.9% lock flush 3 milliLiter(s) IV Push every 8 hours    MEDICATIONS  (PRN):  acetaminophen     Tablet .. 650 milliGRAM(s) Oral every 6 hours PRN Temp greater or equal to 38C (100.4F), Mild Pain (1 - 3)  albuterol    90 MICROgram(s) HFA Inhaler 2 Puff(s) Inhalation every 6 hours PRN Shortness of Breath and/or Wheezing  aluminum hydroxide/magnesium hydroxide/simethicone Suspension 30 milliLiter(s) Oral every 4 hours PRN Dyspepsia  guaiFENesin Oral Liquid (Sugar-Free) 100 milliGRAM(s) Oral every 6 hours PRN Cough  LORazepam   Injectable 1 milliGRAM(s) IV Push every 8 hours PRN Agitation  melatonin 3 milliGRAM(s) Oral at bedtime PRN Insomnia  ondansetron Injectable 4 milliGRAM(s) IV Push every 8 hours PRN Nausea and/or Vomiting

## 2024-01-04 NOTE — PROGRESS NOTE ADULT - SUBJECTIVE AND OBJECTIVE BOX
[facility] Division of Hospital Medicine    Chief Complaint:      SUBJECTIVE / OVERNIGHT EVENTS:    Patient seen and examined at bedside, no acute events overnight, patient denies any new complaints, states she feels well, currently on Stress dose steroids, Endocrinology consulted for potential AI, cortisol inaccurate as patient was on steroids during test. Awaiting MR abd per Nephrology, Renal function stable this AM.     MEDICATIONS  (STANDING):  acetaminophen   IVPB .. 1000 milliGRAM(s) IV Intermittent once  calcitriol   Capsule 0.25 MICROGram(s) Oral daily  calcium carbonate    500 mG (Tums) Chewable 1 Tablet(s) Chew two times a day  epoetin dev (PROCRIT) Injectable 97922 Unit(s) SubCutaneous every 7 days  folic acid 1 milliGRAM(s) Oral daily  hydrocortisone sodium succinate Injectable 50 milliGRAM(s) IV Push every 8 hours  iron sucrose IVPB 200 milliGRAM(s) IV Intermittent every 24 hours  midodrine. 10 milliGRAM(s) Oral once  midodrine. 10 milliGRAM(s) Oral three times a day  multivitamin 1 Tablet(s) Oral daily  pantoprazole    Tablet 40 milliGRAM(s) Oral before breakfast  piperacillin/tazobactam IVPB.. 3.375 Gram(s) IV Intermittent every 12 hours  potassium phosphate / sodium phosphate Powder (PHOS-NaK) 1 Packet(s) Oral two times a day  sodium bicarbonate 1300 milliGRAM(s) Oral three times a day  sodium chloride 0.9% lock flush 3 milliLiter(s) IV Push every 8 hours    MEDICATIONS  (PRN):  acetaminophen     Tablet .. 650 milliGRAM(s) Oral every 6 hours PRN Temp greater or equal to 38C (100.4F), Mild Pain (1 - 3)  albuterol    90 MICROgram(s) HFA Inhaler 2 Puff(s) Inhalation every 6 hours PRN Shortness of Breath and/or Wheezing  aluminum hydroxide/magnesium hydroxide/simethicone Suspension 30 milliLiter(s) Oral every 4 hours PRN Dyspepsia  guaiFENesin Oral Liquid (Sugar-Free) 100 milliGRAM(s) Oral every 6 hours PRN Cough  LORazepam   Injectable 1 milliGRAM(s) IV Push every 8 hours PRN Agitation  melatonin 3 milliGRAM(s) Oral at bedtime PRN Insomnia  ondansetron Injectable 4 milliGRAM(s) IV Push every 8 hours PRN Nausea and/or Vomiting        I&O's Summary    03 Jan 2024 07:01  -  04 Jan 2024 07:00  --------------------------------------------------------  IN: 1142 mL / OUT: 550 mL / NET: 592 mL        PHYSICAL EXAM:  Vital Signs Last 24 Hrs  T(C): 36.5 (04 Jan 2024 10:56), Max: 37 (04 Jan 2024 04:27)  T(F): 97.7 (04 Jan 2024 10:56), Max: 98.6 (04 Jan 2024 04:27)  HR: 72 (04 Jan 2024 10:56) (60 - 75)  BP: 126/84 (04 Jan 2024 10:56) (98/58 - 126/84)  BP(mean): --  RR: 18 (04 Jan 2024 10:56) (17 - 18)  SpO2: 97% (04 Jan 2024 10:56) (97% - 100%)    Parameters below as of 04 Jan 2024 04:27  Patient On (Oxygen Delivery Method): room air      General: Young female sitting in bed comfortably. No acute distress  HEENT: EOMI. Clear conjunctivae. Moist mucus membrane  Neck: Supple.   Chest: CTA bilaterally. No wheezing, rales or rhonchi.   Heart: Normal S1 & S2. RRR.   Abdomen: Non distended. Soft. Non-tender. + BS  Ext: No pedal edema. No calf tenderness   Neuro: AAO x 3. No focal deficit. No speech disorder  Skin: Warm and Dry  Psychiatry: Normal mood and affect    LABS:                        7.8    13.13 )-----------( 273      ( 04 Jan 2024 06:34 )             25.7     01-04    143  |  109<H>  |  24.9<H>  ----------------------------<  117<H>  4.0   |  17.0<L>  |  3.45<H>    Ca    8.9      04 Jan 2024 06:34  Phos  2.1     01-03    TPro  5.9<L>  /  Alb  3.1<L>  /  TBili  <0.2<L>  /  DBili  x   /  AST  16  /  ALT  13  /  AlkPhos  43  01-04    PT/INR - ( 02 Jan 2024 23:51 )   PT: 14.0 sec;   INR: 1.27 ratio         PTT - ( 02 Jan 2024 23:51 )  PTT:28.9 sec      Urinalysis Basic - ( 04 Jan 2024 06:34 )    Color: x / Appearance: x / SG: x / pH: x  Gluc: 117 mg/dL / Ketone: x  / Bili: x / Urobili: x   Blood: x / Protein: x / Nitrite: x   Leuk Esterase: x / RBC: x / WBC x   Sq Epi: x / Non Sq Epi: x / Bacteria: x        Culture - Blood (collected 02 Jan 2024 22:10)  Source: .Blood Blood-Peripheral  Preliminary Report (04 Jan 2024 03:02):    No growth at 24 hours    Culture - Blood (collected 02 Jan 2024 22:10)  Source: .Blood Blood-Peripheral  Preliminary Report (04 Jan 2024 03:02):    No growth at 24 hours      CAPILLARY BLOOD GLUCOSE            RADIOLOGY & ADDITIONAL TESTS:  Results Reviewed:   Imaging Personally Reviewed:  Electrocardiogram Personally Reviewed:         [facility] Division of Hospital Medicine    Chief Complaint:      SUBJECTIVE / OVERNIGHT EVENTS:    Patient seen and examined at bedside, no acute events overnight, patient denies any new complaints, states she feels well, currently on Stress dose steroids, Endocrinology consulted for potential AI, cortisol inaccurate as patient was on steroids during test. Awaiting MR abd per Nephrology, Renal function stable this AM.     MEDICATIONS  (STANDING):  acetaminophen   IVPB .. 1000 milliGRAM(s) IV Intermittent once  calcitriol   Capsule 0.25 MICROGram(s) Oral daily  calcium carbonate    500 mG (Tums) Chewable 1 Tablet(s) Chew two times a day  epoetin dev (PROCRIT) Injectable 71965 Unit(s) SubCutaneous every 7 days  folic acid 1 milliGRAM(s) Oral daily  hydrocortisone sodium succinate Injectable 50 milliGRAM(s) IV Push every 8 hours  iron sucrose IVPB 200 milliGRAM(s) IV Intermittent every 24 hours  midodrine. 10 milliGRAM(s) Oral once  midodrine. 10 milliGRAM(s) Oral three times a day  multivitamin 1 Tablet(s) Oral daily  pantoprazole    Tablet 40 milliGRAM(s) Oral before breakfast  piperacillin/tazobactam IVPB.. 3.375 Gram(s) IV Intermittent every 12 hours  potassium phosphate / sodium phosphate Powder (PHOS-NaK) 1 Packet(s) Oral two times a day  sodium bicarbonate 1300 milliGRAM(s) Oral three times a day  sodium chloride 0.9% lock flush 3 milliLiter(s) IV Push every 8 hours    MEDICATIONS  (PRN):  acetaminophen     Tablet .. 650 milliGRAM(s) Oral every 6 hours PRN Temp greater or equal to 38C (100.4F), Mild Pain (1 - 3)  albuterol    90 MICROgram(s) HFA Inhaler 2 Puff(s) Inhalation every 6 hours PRN Shortness of Breath and/or Wheezing  aluminum hydroxide/magnesium hydroxide/simethicone Suspension 30 milliLiter(s) Oral every 4 hours PRN Dyspepsia  guaiFENesin Oral Liquid (Sugar-Free) 100 milliGRAM(s) Oral every 6 hours PRN Cough  LORazepam   Injectable 1 milliGRAM(s) IV Push every 8 hours PRN Agitation  melatonin 3 milliGRAM(s) Oral at bedtime PRN Insomnia  ondansetron Injectable 4 milliGRAM(s) IV Push every 8 hours PRN Nausea and/or Vomiting        I&O's Summary    03 Jan 2024 07:01  -  04 Jan 2024 07:00  --------------------------------------------------------  IN: 1142 mL / OUT: 550 mL / NET: 592 mL        PHYSICAL EXAM:  Vital Signs Last 24 Hrs  T(C): 36.5 (04 Jan 2024 10:56), Max: 37 (04 Jan 2024 04:27)  T(F): 97.7 (04 Jan 2024 10:56), Max: 98.6 (04 Jan 2024 04:27)  HR: 72 (04 Jan 2024 10:56) (60 - 75)  BP: 126/84 (04 Jan 2024 10:56) (98/58 - 126/84)  BP(mean): --  RR: 18 (04 Jan 2024 10:56) (17 - 18)  SpO2: 97% (04 Jan 2024 10:56) (97% - 100%)    Parameters below as of 04 Jan 2024 04:27  Patient On (Oxygen Delivery Method): room air      General: Young female sitting in bed comfortably. No acute distress  HEENT: EOMI. Clear conjunctivae. Moist mucus membrane  Neck: Supple.   Chest: CTA bilaterally. No wheezing, rales or rhonchi.   Heart: Normal S1 & S2. RRR.   Abdomen: Non distended. Soft. Non-tender. + BS  Ext: No pedal edema. No calf tenderness   Neuro: AAO x 3. No focal deficit. No speech disorder  Skin: Warm and Dry  Psychiatry: Normal mood and affect    LABS:                        7.8    13.13 )-----------( 273      ( 04 Jan 2024 06:34 )             25.7     01-04    143  |  109<H>  |  24.9<H>  ----------------------------<  117<H>  4.0   |  17.0<L>  |  3.45<H>    Ca    8.9      04 Jan 2024 06:34  Phos  2.1     01-03    TPro  5.9<L>  /  Alb  3.1<L>  /  TBili  <0.2<L>  /  DBili  x   /  AST  16  /  ALT  13  /  AlkPhos  43  01-04    PT/INR - ( 02 Jan 2024 23:51 )   PT: 14.0 sec;   INR: 1.27 ratio         PTT - ( 02 Jan 2024 23:51 )  PTT:28.9 sec      Urinalysis Basic - ( 04 Jan 2024 06:34 )    Color: x / Appearance: x / SG: x / pH: x  Gluc: 117 mg/dL / Ketone: x  / Bili: x / Urobili: x   Blood: x / Protein: x / Nitrite: x   Leuk Esterase: x / RBC: x / WBC x   Sq Epi: x / Non Sq Epi: x / Bacteria: x        Culture - Blood (collected 02 Jan 2024 22:10)  Source: .Blood Blood-Peripheral  Preliminary Report (04 Jan 2024 03:02):    No growth at 24 hours    Culture - Blood (collected 02 Jan 2024 22:10)  Source: .Blood Blood-Peripheral  Preliminary Report (04 Jan 2024 03:02):    No growth at 24 hours      CAPILLARY BLOOD GLUCOSE            RADIOLOGY & ADDITIONAL TESTS:  Results Reviewed:   Imaging Personally Reviewed:  Electrocardiogram Personally Reviewed:

## 2024-01-04 NOTE — PROGRESS NOTE ADULT - ASSESSMENT
A) CRF, UTI, Metabolic acidosis  with normal anion gap, + Viral illnesses. Anemia. Low calcium.      P) IV  fluid d/checo; po bicarbonate, tums. Follow up as op if otherwise  stable.

## 2024-01-04 NOTE — CONSULT NOTE ADULT - SUBJECTIVE AND OBJECTIVE BOX
Patient is a 23y old  Female who presents with a chief complaint of MARIVEL on CKD?  Confusion (2024 09:10)    HPI:  23F Maori speaking, w/ hx of hyperthyroidism and ckd brought in for AMS at a [employer] event. Patient was apparently in her normal state of health at a [employer] event and shortly after eating rice/beans and meat started c/o abdominal pain and cramping and then vomited. Friends who witnessed it said they called EMS due to patient being very agitated. No hx of drug use. She has a hx of "kidney problems" and used to be on medications for it but self discontinued " a while ago." In the field Patient given Versed and then Ativan in ER to allow for exam and labs. Labs with Hbg of 8.6 and Cr. of 3.95 with reported baseline of 2.5. No reported mental health history. Admitted for acute metabolic encephalopathy with MARIVEL on CKD. Found to have COVID  Course complicated by high grade fever, hypotension  Consult for adrenal insufficiency    : Tita Clark [phone]    chart reviewed: normotensive on stress dose steroids and on midodrine  cortisol level drawn today 8am 24 after 4 doses of steroids have already been given          PAST MEDICAL & SURGICAL HISTORY:  Chronic kidney disease, unspecified CKD stage    Hyperthyroidism    Hyperthyroidism    H/O  section  x2        Social History:  As reported by friends at bedside (2024 05:57)      FAMILY HISTORY:  , has 2 kids, unemployed, denies any alcohol recreational substance or tobacco usage      Allergies    No Known Allergies    Intolerances        ROS as noted in the HPI    MEDICATIONS  (STANDING):  acetaminophen   IVPB .. 1000 milliGRAM(s) IV Intermittent once  calcitriol   Capsule 0.25 MICROGram(s) Oral daily  calcium carbonate    500 mG (Tums) Chewable 1 Tablet(s) Chew two times a day  epoetin dev (PROCRIT) Injectable 16385 Unit(s) SubCutaneous every 7 days  folic acid 1 milliGRAM(s) Oral daily  hydrocortisone sodium succinate Injectable 50 milliGRAM(s) IV Push every 6 hours  iron sucrose IVPB 200 milliGRAM(s) IV Intermittent every 24 hours  midodrine. 10 milliGRAM(s) Oral three times a day  midodrine. 10 milliGRAM(s) Oral once  multivitamin 1 Tablet(s) Oral daily  pantoprazole    Tablet 40 milliGRAM(s) Oral before breakfast  piperacillin/tazobactam IVPB.. 3.375 Gram(s) IV Intermittent every 12 hours  potassium phosphate / sodium phosphate Powder (PHOS-NaK) 1 Packet(s) Oral two times a day  sodium bicarbonate 1300 milliGRAM(s) Oral three times a day  sodium chloride 0.9% lock flush 3 milliLiter(s) IV Push every 8 hours    MEDICATIONS  (PRN):  acetaminophen     Tablet .. 650 milliGRAM(s) Oral every 6 hours PRN Temp greater or equal to 38C (100.4F), Mild Pain (1 - 3)  albuterol    90 MICROgram(s) HFA Inhaler 2 Puff(s) Inhalation every 6 hours PRN Shortness of Breath and/or Wheezing  aluminum hydroxide/magnesium hydroxide/simethicone Suspension 30 milliLiter(s) Oral every 4 hours PRN Dyspepsia  guaiFENesin Oral Liquid (Sugar-Free) 100 milliGRAM(s) Oral every 6 hours PRN Cough  LORazepam   Injectable 1 milliGRAM(s) IV Push every 8 hours PRN Agitation  melatonin 3 milliGRAM(s) Oral at bedtime PRN Insomnia  ondansetron Injectable 4 milliGRAM(s) IV Push every 8 hours PRN Nausea and/or Vomiting      Vital Signs Last 24 Hrs  T(C): 36.5 (2024 10:56), Max: 37 (2024 15:00)  T(F): 97.7 (2024 10:56), Max: 98.6 (2024 15:00)  HR: 72 (2024 10:56) (60 - 88)  BP: 126/84 (2024 10:56) (98/58 - 126/84)  BP(mean): --  RR: 18 (2024 10:56) (17 - 18)  SpO2: 97% (2024 10:56) (97% - 100%)    Parameters below as of 2024 04:27  Patient On (Oxygen Delivery Method): room air          Physical Exam:    Constitutional: NAD, well-developed  HEENT: EOMI, no exophalmos  Neck: trachea midline, no thyroid enlargement  Respiratory: CTAB, normal respirations  Cardiovascular: S1 and S2, RRR  Gastrointestinal: BS+, soft, ntnd  Extremities: No peripheral edema  Neurological: AOx3, no focal deficits  Psychiatric: Normal mood and normal affect  Skin: no rashes, no acanthosis    LABS      143  |  109<H>  |  24.9<H>  ----------------------------<  117<H>  4.0   |  17.0<L>  |  3.45<H>    Ca    8.9      2024 06:34  Phos  2.1         TPro  5.9<L>  /  Alb  3.1<L>  /  TBili  <0.2<L>  /  DBili  x   /  AST  16  /  ALT  13  /  AlkPhos  43                            7.8    13.13 )-----------( 273      ( 2024 06:34 )             25.7             Ketone - Urine: Negative mg/dL ( @ 00:36)    Aspartate Aminotransferase (AST/SGOT): 16 U/L (24 @ 06:34)  Alanine Aminotransferase (ALT/SGPT): 13 U/L (24 @ 06:34)  Alkaline Phosphatase: 43 U/L (24 @ 06:34)  Albumin: 3.1 g/dL (24 @ 06:34)  Albumin: 3.8 g/dL (24 @ 22:09)  Aspartate Aminotransferase (AST/SGOT): 15 U/L (24 @ 22:09)  Alanine Aminotransferase (ALT/SGPT): 8 U/L (24 @ 22:09)  Alkaline Phosphatase: 63 U/L (24 @ 22:09)    Thyroid Stimulating Hormone, Serum: 1.99 uIU/mL (24 @ 06:42)        CAPILLARY BLOOD GLUCOSE     Patient is a 23y old  Female who presents with a chief complaint of MARIVEL on CKD?  Confusion (2024 09:10)    HPI:  23F Italian speaking, w/ hx of hyperthyroidism and ckd brought in for AMS at a [employer] event. Patient was apparently in her normal state of health at a [employer] event and shortly after eating rice/beans and meat started c/o abdominal pain and cramping and then vomited. Friends who witnessed it said they called EMS due to patient being very agitated. No hx of drug use. She has a hx of "kidney problems" and used to be on medications for it but self discontinued " a while ago." In the field Patient given Versed and then Ativan in ER to allow for exam and labs. Labs with Hbg of 8.6 and Cr. of 3.95 with reported baseline of 2.5. No reported mental health history. Admitted for acute metabolic encephalopathy with MARIVEL on CKD. Found to have COVID  Course complicated by high grade fever, hypotension  Consult for adrenal insufficiency    : Tita Clark [phone]    chart reviewed: normotensive on stress dose steroids and on midodrine  cortisol level drawn today 8am 24 after 4 doses of steroids have already been given          PAST MEDICAL & SURGICAL HISTORY:  Chronic kidney disease, unspecified CKD stage    Hyperthyroidism    Hyperthyroidism    H/O  section  x2        Social History:  As reported by friends at bedside (2024 05:57)      FAMILY HISTORY:  , has 2 kids, unemployed, denies any alcohol recreational substance or tobacco usage      Allergies    No Known Allergies    Intolerances        ROS as noted in the HPI    MEDICATIONS  (STANDING):  acetaminophen   IVPB .. 1000 milliGRAM(s) IV Intermittent once  calcitriol   Capsule 0.25 MICROGram(s) Oral daily  calcium carbonate    500 mG (Tums) Chewable 1 Tablet(s) Chew two times a day  epoetin dev (PROCRIT) Injectable 90492 Unit(s) SubCutaneous every 7 days  folic acid 1 milliGRAM(s) Oral daily  hydrocortisone sodium succinate Injectable 50 milliGRAM(s) IV Push every 6 hours  iron sucrose IVPB 200 milliGRAM(s) IV Intermittent every 24 hours  midodrine. 10 milliGRAM(s) Oral three times a day  midodrine. 10 milliGRAM(s) Oral once  multivitamin 1 Tablet(s) Oral daily  pantoprazole    Tablet 40 milliGRAM(s) Oral before breakfast  piperacillin/tazobactam IVPB.. 3.375 Gram(s) IV Intermittent every 12 hours  potassium phosphate / sodium phosphate Powder (PHOS-NaK) 1 Packet(s) Oral two times a day  sodium bicarbonate 1300 milliGRAM(s) Oral three times a day  sodium chloride 0.9% lock flush 3 milliLiter(s) IV Push every 8 hours    MEDICATIONS  (PRN):  acetaminophen     Tablet .. 650 milliGRAM(s) Oral every 6 hours PRN Temp greater or equal to 38C (100.4F), Mild Pain (1 - 3)  albuterol    90 MICROgram(s) HFA Inhaler 2 Puff(s) Inhalation every 6 hours PRN Shortness of Breath and/or Wheezing  aluminum hydroxide/magnesium hydroxide/simethicone Suspension 30 milliLiter(s) Oral every 4 hours PRN Dyspepsia  guaiFENesin Oral Liquid (Sugar-Free) 100 milliGRAM(s) Oral every 6 hours PRN Cough  LORazepam   Injectable 1 milliGRAM(s) IV Push every 8 hours PRN Agitation  melatonin 3 milliGRAM(s) Oral at bedtime PRN Insomnia  ondansetron Injectable 4 milliGRAM(s) IV Push every 8 hours PRN Nausea and/or Vomiting      Vital Signs Last 24 Hrs  T(C): 36.5 (2024 10:56), Max: 37 (2024 15:00)  T(F): 97.7 (2024 10:56), Max: 98.6 (2024 15:00)  HR: 72 (2024 10:56) (60 - 88)  BP: 126/84 (2024 10:56) (98/58 - 126/84)  BP(mean): --  RR: 18 (2024 10:56) (17 - 18)  SpO2: 97% (2024 10:56) (97% - 100%)    Parameters below as of 2024 04:27  Patient On (Oxygen Delivery Method): room air          Physical Exam:    Constitutional: NAD, well-developed  HEENT: EOMI, no exophalmos  Neck: trachea midline, no thyroid enlargement  Respiratory: CTAB, normal respirations  Cardiovascular: S1 and S2, RRR  Gastrointestinal: BS+, soft, ntnd  Extremities: No peripheral edema  Neurological: AOx3, no focal deficits  Psychiatric: Normal mood and normal affect  Skin: no rashes, no acanthosis    LABS      143  |  109<H>  |  24.9<H>  ----------------------------<  117<H>  4.0   |  17.0<L>  |  3.45<H>    Ca    8.9      2024 06:34  Phos  2.1         TPro  5.9<L>  /  Alb  3.1<L>  /  TBili  <0.2<L>  /  DBili  x   /  AST  16  /  ALT  13  /  AlkPhos  43                            7.8    13.13 )-----------( 273      ( 2024 06:34 )             25.7             Ketone - Urine: Negative mg/dL ( @ 00:36)    Aspartate Aminotransferase (AST/SGOT): 16 U/L (24 @ 06:34)  Alanine Aminotransferase (ALT/SGPT): 13 U/L (24 @ 06:34)  Alkaline Phosphatase: 43 U/L (24 @ 06:34)  Albumin: 3.1 g/dL (24 @ 06:34)  Albumin: 3.8 g/dL (24 @ 22:09)  Aspartate Aminotransferase (AST/SGOT): 15 U/L (24 @ 22:09)  Alanine Aminotransferase (ALT/SGPT): 8 U/L (24 @ 22:09)  Alkaline Phosphatase: 63 U/L (24 @ 22:09)    Thyroid Stimulating Hormone, Serum: 1.99 uIU/mL (24 @ 06:42)        CAPILLARY BLOOD GLUCOSE     Patient is a 23y old  Female who presents with a chief complaint of MARIVEL on CKD?  Confusion (2024 09:10)    HPI:  23F Swedish speaking, w/ hx of thyroid issues and ckd brought in for AMS at a [employer] event. Patient was apparently in her normal state of health at a [employer] event and shortly after eating rice/beans and meat started c/o abdominal pain and cramping and then vomited. Friends who witnessed it said they called EMS due to patient being very agitated. No hx of drug use. She has a hx of "kidney problems" and used to be on medications for it but self discontinued " a while ago." In the field Patient given Versed and then Ativan in ER to allow for exam and labs. Labs with Hbg of 8.6 and Cr. of 3.95 with reported baseline of 2.5. No reported mental health history. Admitted for acute metabolic encephalopathy with MARIVEL on CKD. Found to have COVID  Course complicated by high grade fever, hypotension  Consult for adrenal insufficiency    : Tita Clark [phone]    chart reviewed: normotensive on stress dose steroids and on midodrine  cortisol level drawn today 8am 24 after 4 doses of steroids have already been given    unable to reach any family     used [id number]  no hx of adrenal issues or bp issues  reported that she had stopped taking thyroid meds for about 1 month bc did not want to pay "$5" for them. threw away all the pill bottles. reports that she takes a small yellowish pill  does not know the pharmacy name or address of the clinic  takes a taxi to go to the clinic  has had thyroid issues for last 1-2 years  fhx of thyroid issues in granmother  fhx of diabetes in other grandmother  does not know how to read and does not remember things well    PAST MEDICAL & SURGICAL HISTORY:  Chronic kidney disease, unspecified CKD stage    Hyperthyroidism    Hyperthyroidism    H/O  section  x2        Social History:  As reported by friends at bedside (2024 05:57)      FAMILY HISTORY:  , has 2 kids, unemployed, denies any alcohol recreational substance or tobacco usage      Allergies    No Known Allergies    Intolerances        ROS as noted in the HPI    MEDICATIONS  (STANDING):  acetaminophen   IVPB .. 1000 milliGRAM(s) IV Intermittent once  calcitriol   Capsule 0.25 MICROGram(s) Oral daily  calcium carbonate    500 mG (Tums) Chewable 1 Tablet(s) Chew two times a day  epoetin dev (PROCRIT) Injectable 79249 Unit(s) SubCutaneous every 7 days  folic acid 1 milliGRAM(s) Oral daily  hydrocortisone sodium succinate Injectable 50 milliGRAM(s) IV Push every 6 hours  iron sucrose IVPB 200 milliGRAM(s) IV Intermittent every 24 hours  midodrine. 10 milliGRAM(s) Oral three times a day  midodrine. 10 milliGRAM(s) Oral once  multivitamin 1 Tablet(s) Oral daily  pantoprazole    Tablet 40 milliGRAM(s) Oral before breakfast  piperacillin/tazobactam IVPB.. 3.375 Gram(s) IV Intermittent every 12 hours  potassium phosphate / sodium phosphate Powder (PHOS-NaK) 1 Packet(s) Oral two times a day  sodium bicarbonate 1300 milliGRAM(s) Oral three times a day  sodium chloride 0.9% lock flush 3 milliLiter(s) IV Push every 8 hours    MEDICATIONS  (PRN):  acetaminophen     Tablet .. 650 milliGRAM(s) Oral every 6 hours PRN Temp greater or equal to 38C (100.4F), Mild Pain (1 - 3)  albuterol    90 MICROgram(s) HFA Inhaler 2 Puff(s) Inhalation every 6 hours PRN Shortness of Breath and/or Wheezing  aluminum hydroxide/magnesium hydroxide/simethicone Suspension 30 milliLiter(s) Oral every 4 hours PRN Dyspepsia  guaiFENesin Oral Liquid (Sugar-Free) 100 milliGRAM(s) Oral every 6 hours PRN Cough  LORazepam   Injectable 1 milliGRAM(s) IV Push every 8 hours PRN Agitation  melatonin 3 milliGRAM(s) Oral at bedtime PRN Insomnia  ondansetron Injectable 4 milliGRAM(s) IV Push every 8 hours PRN Nausea and/or Vomiting      Vital Signs Last 24 Hrs  T(C): 36.5 (2024 10:56), Max: 37 (2024 15:00)  T(F): 97.7 (2024 10:56), Max: 98.6 (2024 15:00)  HR: 72 (2024 10:56) (60 - 88)  BP: 126/84 (2024 10:56) (98/58 - 126/84)  BP(mean): --  RR: 18 (2024 10:56) (17 - 18)  SpO2: 97% (2024 10:56) (97% - 100%)    Parameters below as of 2024 04:27  Patient On (Oxygen Delivery Method): room air          Physical Exam:    Constitutional: NAD, well-developed  HEENT: EOMI, no exophalmos  Neck: trachea midline, no thyroid enlargement  Respiratory: CTAB, normal respirations  Cardiovascular: S1 and S2, RRR  Gastrointestinal: BS+, soft, ntnd  Extremities: No peripheral edema  Neurological: AOx3, no focal deficits  Psychiatric: Normal mood and normal affect  Skin: no rashes, no acanthosis    LABS      143  |  109<H>  |  24.9<H>  ----------------------------<  117<H>  4.0   |  17.0<L>  |  3.45<H>    Ca    8.9      2024 06:34  Phos  2.1         TPro  5.9<L>  /  Alb  3.1<L>  /  TBili  <0.2<L>  /  DBili  x   /  AST  16  /  ALT  13  /  AlkPhos  43                            7.8    13.13 )-----------( 273      ( 2024 06:34 )             25.7             Ketone - Urine: Negative mg/dL ( @ 00:36)    Aspartate Aminotransferase (AST/SGOT): 16 U/L (24 @ 06:34)  Alanine Aminotransferase (ALT/SGPT): 13 U/L (24 @ 06:34)  Alkaline Phosphatase: 43 U/L (24 @ 06:34)  Albumin: 3.1 g/dL (24 @ 06:34)  Albumin: 3.8 g/dL (24 @ 22:09)  Aspartate Aminotransferase (AST/SGOT): 15 U/L (24 @ 22:09)  Alanine Aminotransferase (ALT/SGPT): 8 U/L (24 @ 22:09)  Alkaline Phosphatase: 63 U/L (24 @ 22:09)    Thyroid Stimulating Hormone, Serum: 1.99 uIU/mL (24 @ 06:42)        CAPILLARY BLOOD GLUCOSE     Patient is a 23y old  Female who presents with a chief complaint of MARIVEL on CKD?  Confusion (2024 09:10)    HPI:  23F Kiswahili speaking, w/ hx of thyroid issues and ckd brought in for AMS at a Yaz[employer] event. Patient was apparently in her normal state of health at a Religious event and shortly after eating rice/beans and meat started c/o abdominal pain and cramping and then vomited. Friends who witnessed it said they called EMS due to patient being very agitated. No hx of drug use. She has a hx of "kidney problems" and used to be on medications for it but self discontinued " a while ago." In the field Patient given Versed and then Ativan in ER to allow for exam and labs. Labs with Hbg of 8.6 and Cr. of 3.95 with reported baseline of 2.5. No reported mental health history. Admitted for acute metabolic encephalopathy with MARIVEL on CKD. Found to have COVID  Course complicated by high grade fever, hypotension  Consult for adrenal insufficiency    : Tita Clark [phone]    chart reviewed: normotensive on stress dose steroids and on midodrine  cortisol level drawn today 8am 24 after 4 doses of steroids have already been given    unable to reach any family     used [id number]  no hx of adrenal issues or bp issues  reported that she had stopped taking thyroid meds for about 1 month bc did not want to pay "$5" for them. threw away all the pill bottles. reports that she takes a small yellowish pill  does not know the pharmacy name or address of the clinic  takes a taxi to go to the clinic  has had thyroid issues for last 1-2 years  fhx of thyroid issues in granmother  fhx of diabetes in other grandmother  does not know how to read and does not remember things well    PAST MEDICAL & SURGICAL HISTORY:  Chronic kidney disease, unspecified CKD stage    Hyperthyroidism    Hyperthyroidism    H/O  section  x2        Social History:  As reported by friends at bedside (2024 05:57)      FAMILY HISTORY:  , has 2 kids, unemployed, denies any alcohol recreational substance or tobacco usage      Allergies    No Known Allergies    Intolerances        ROS as noted in the HPI    MEDICATIONS  (STANDING):  acetaminophen   IVPB .. 1000 milliGRAM(s) IV Intermittent once  calcitriol   Capsule 0.25 MICROGram(s) Oral daily  calcium carbonate    500 mG (Tums) Chewable 1 Tablet(s) Chew two times a day  epoetin dev (PROCRIT) Injectable 49489 Unit(s) SubCutaneous every 7 days  folic acid 1 milliGRAM(s) Oral daily  hydrocortisone sodium succinate Injectable 50 milliGRAM(s) IV Push every 6 hours  iron sucrose IVPB 200 milliGRAM(s) IV Intermittent every 24 hours  midodrine. 10 milliGRAM(s) Oral three times a day  midodrine. 10 milliGRAM(s) Oral once  multivitamin 1 Tablet(s) Oral daily  pantoprazole    Tablet 40 milliGRAM(s) Oral before breakfast  piperacillin/tazobactam IVPB.. 3.375 Gram(s) IV Intermittent every 12 hours  potassium phosphate / sodium phosphate Powder (PHOS-NaK) 1 Packet(s) Oral two times a day  sodium bicarbonate 1300 milliGRAM(s) Oral three times a day  sodium chloride 0.9% lock flush 3 milliLiter(s) IV Push every 8 hours    MEDICATIONS  (PRN):  acetaminophen     Tablet .. 650 milliGRAM(s) Oral every 6 hours PRN Temp greater or equal to 38C (100.4F), Mild Pain (1 - 3)  albuterol    90 MICROgram(s) HFA Inhaler 2 Puff(s) Inhalation every 6 hours PRN Shortness of Breath and/or Wheezing  aluminum hydroxide/magnesium hydroxide/simethicone Suspension 30 milliLiter(s) Oral every 4 hours PRN Dyspepsia  guaiFENesin Oral Liquid (Sugar-Free) 100 milliGRAM(s) Oral every 6 hours PRN Cough  LORazepam   Injectable 1 milliGRAM(s) IV Push every 8 hours PRN Agitation  melatonin 3 milliGRAM(s) Oral at bedtime PRN Insomnia  ondansetron Injectable 4 milliGRAM(s) IV Push every 8 hours PRN Nausea and/or Vomiting      Vital Signs Last 24 Hrs  T(C): 36.5 (2024 10:56), Max: 37 (2024 15:00)  T(F): 97.7 (2024 10:56), Max: 98.6 (2024 15:00)  HR: 72 (2024 10:56) (60 - 88)  BP: 126/84 (2024 10:56) (98/58 - 126/84)  BP(mean): --  RR: 18 (2024 10:56) (17 - 18)  SpO2: 97% (2024 10:56) (97% - 100%)    Parameters below as of 2024 04:27  Patient On (Oxygen Delivery Method): room air          Physical Exam:    Constitutional: NAD, well-developed  HEENT: EOMI, no exophalmos  Neck: trachea midline, no thyroid enlargement  Respiratory: CTAB, normal respirations  Cardiovascular: S1 and S2, RRR  Gastrointestinal: BS+, soft, ntnd  Extremities: No peripheral edema  Neurological: AOx3, no focal deficits  Psychiatric: Normal mood and normal affect  Skin: no rashes, no acanthosis    LABS      143  |  109<H>  |  24.9<H>  ----------------------------<  117<H>  4.0   |  17.0<L>  |  3.45<H>    Ca    8.9      2024 06:34  Phos  2.1         TPro  5.9<L>  /  Alb  3.1<L>  /  TBili  <0.2<L>  /  DBili  x   /  AST  16  /  ALT  13  /  AlkPhos  43                            7.8    13.13 )-----------( 273      ( 2024 06:34 )             25.7             Ketone - Urine: Negative mg/dL ( @ 00:36)    Aspartate Aminotransferase (AST/SGOT): 16 U/L (24 @ 06:34)  Alanine Aminotransferase (ALT/SGPT): 13 U/L (24 @ 06:34)  Alkaline Phosphatase: 43 U/L (24 @ 06:34)  Albumin: 3.1 g/dL (24 @ 06:34)  Albumin: 3.8 g/dL (24 @ 22:09)  Aspartate Aminotransferase (AST/SGOT): 15 U/L (24 @ 22:09)  Alanine Aminotransferase (ALT/SGPT): 8 U/L (24 @ 22:09)  Alkaline Phosphatase: 63 U/L (24 @ 22:09)    Thyroid Stimulating Hormone, Serum: 1.99 uIU/mL (24 @ 06:42)        CAPILLARY BLOOD GLUCOSE

## 2024-01-05 DIAGNOSIS — U07.1 COVID-19: ICD-10-CM

## 2024-01-05 DIAGNOSIS — R00.1 BRADYCARDIA, UNSPECIFIED: ICD-10-CM

## 2024-01-05 DIAGNOSIS — N17.9 ACUTE KIDNEY FAILURE, UNSPECIFIED: ICD-10-CM

## 2024-01-05 LAB
ANION GAP SERPL CALC-SCNC: 15 MMOL/L — SIGNIFICANT CHANGE UP (ref 5–17)
ANION GAP SERPL CALC-SCNC: 15 MMOL/L — SIGNIFICANT CHANGE UP (ref 5–17)
BASOPHILS # BLD AUTO: 0.02 K/UL — SIGNIFICANT CHANGE UP (ref 0–0.2)
BASOPHILS # BLD AUTO: 0.02 K/UL — SIGNIFICANT CHANGE UP (ref 0–0.2)
BASOPHILS NFR BLD AUTO: 0.2 % — SIGNIFICANT CHANGE UP (ref 0–2)
BASOPHILS NFR BLD AUTO: 0.2 % — SIGNIFICANT CHANGE UP (ref 0–2)
BUN SERPL-MCNC: 31.6 MG/DL — HIGH (ref 8–20)
BUN SERPL-MCNC: 31.6 MG/DL — HIGH (ref 8–20)
CALCIUM SERPL-MCNC: 8.5 MG/DL — SIGNIFICANT CHANGE UP (ref 8.4–10.5)
CALCIUM SERPL-MCNC: 8.5 MG/DL — SIGNIFICANT CHANGE UP (ref 8.4–10.5)
CHLORIDE SERPL-SCNC: 108 MMOL/L — SIGNIFICANT CHANGE UP (ref 96–108)
CHLORIDE SERPL-SCNC: 108 MMOL/L — SIGNIFICANT CHANGE UP (ref 96–108)
CO2 SERPL-SCNC: 21 MMOL/L — LOW (ref 22–29)
CO2 SERPL-SCNC: 21 MMOL/L — LOW (ref 22–29)
CREAT SERPL-MCNC: 3.97 MG/DL — HIGH (ref 0.5–1.3)
CREAT SERPL-MCNC: 3.97 MG/DL — HIGH (ref 0.5–1.3)
EGFR: 15 ML/MIN/1.73M2 — LOW
EGFR: 15 ML/MIN/1.73M2 — LOW
EOSINOPHIL # BLD AUTO: 0 K/UL — SIGNIFICANT CHANGE UP (ref 0–0.5)
EOSINOPHIL # BLD AUTO: 0 K/UL — SIGNIFICANT CHANGE UP (ref 0–0.5)
EOSINOPHIL NFR BLD AUTO: 0 % — SIGNIFICANT CHANGE UP (ref 0–6)
EOSINOPHIL NFR BLD AUTO: 0 % — SIGNIFICANT CHANGE UP (ref 0–6)
GLUCOSE SERPL-MCNC: 141 MG/DL — HIGH (ref 70–99)
GLUCOSE SERPL-MCNC: 141 MG/DL — HIGH (ref 70–99)
HCT VFR BLD CALC: 25 % — LOW (ref 34.5–45)
HCT VFR BLD CALC: 25 % — LOW (ref 34.5–45)
HGB BLD-MCNC: 7.9 G/DL — LOW (ref 11.5–15.5)
HGB BLD-MCNC: 7.9 G/DL — LOW (ref 11.5–15.5)
IMM GRANULOCYTES NFR BLD AUTO: 1.2 % — HIGH (ref 0–0.9)
IMM GRANULOCYTES NFR BLD AUTO: 1.2 % — HIGH (ref 0–0.9)
LYMPHOCYTES # BLD AUTO: 2.52 K/UL — SIGNIFICANT CHANGE UP (ref 1–3.3)
LYMPHOCYTES # BLD AUTO: 2.52 K/UL — SIGNIFICANT CHANGE UP (ref 1–3.3)
LYMPHOCYTES # BLD AUTO: 20.6 % — SIGNIFICANT CHANGE UP (ref 13–44)
LYMPHOCYTES # BLD AUTO: 20.6 % — SIGNIFICANT CHANGE UP (ref 13–44)
MAGNESIUM SERPL-MCNC: 1.8 MG/DL — SIGNIFICANT CHANGE UP (ref 1.6–2.6)
MAGNESIUM SERPL-MCNC: 1.8 MG/DL — SIGNIFICANT CHANGE UP (ref 1.6–2.6)
MCHC RBC-ENTMCNC: 27.7 PG — SIGNIFICANT CHANGE UP (ref 27–34)
MCHC RBC-ENTMCNC: 27.7 PG — SIGNIFICANT CHANGE UP (ref 27–34)
MCHC RBC-ENTMCNC: 31.6 GM/DL — LOW (ref 32–36)
MCHC RBC-ENTMCNC: 31.6 GM/DL — LOW (ref 32–36)
MCV RBC AUTO: 87.7 FL — SIGNIFICANT CHANGE UP (ref 80–100)
MCV RBC AUTO: 87.7 FL — SIGNIFICANT CHANGE UP (ref 80–100)
MONOCYTES # BLD AUTO: 0.52 K/UL — SIGNIFICANT CHANGE UP (ref 0–0.9)
MONOCYTES # BLD AUTO: 0.52 K/UL — SIGNIFICANT CHANGE UP (ref 0–0.9)
MONOCYTES NFR BLD AUTO: 4.2 % — SIGNIFICANT CHANGE UP (ref 2–14)
MONOCYTES NFR BLD AUTO: 4.2 % — SIGNIFICANT CHANGE UP (ref 2–14)
NEUTROPHILS # BLD AUTO: 9.04 K/UL — HIGH (ref 1.8–7.4)
NEUTROPHILS # BLD AUTO: 9.04 K/UL — HIGH (ref 1.8–7.4)
NEUTROPHILS NFR BLD AUTO: 73.8 % — SIGNIFICANT CHANGE UP (ref 43–77)
NEUTROPHILS NFR BLD AUTO: 73.8 % — SIGNIFICANT CHANGE UP (ref 43–77)
PHOSPHATE SERPL-MCNC: 4.9 MG/DL — HIGH (ref 2.4–4.7)
PHOSPHATE SERPL-MCNC: 4.9 MG/DL — HIGH (ref 2.4–4.7)
PLATELET # BLD AUTO: 308 K/UL — SIGNIFICANT CHANGE UP (ref 150–400)
PLATELET # BLD AUTO: 308 K/UL — SIGNIFICANT CHANGE UP (ref 150–400)
POTASSIUM SERPL-MCNC: 4.1 MMOL/L — SIGNIFICANT CHANGE UP (ref 3.5–5.3)
POTASSIUM SERPL-MCNC: 4.1 MMOL/L — SIGNIFICANT CHANGE UP (ref 3.5–5.3)
POTASSIUM SERPL-SCNC: 4.1 MMOL/L — SIGNIFICANT CHANGE UP (ref 3.5–5.3)
POTASSIUM SERPL-SCNC: 4.1 MMOL/L — SIGNIFICANT CHANGE UP (ref 3.5–5.3)
RBC # BLD: 2.85 M/UL — LOW (ref 3.8–5.2)
RBC # BLD: 2.85 M/UL — LOW (ref 3.8–5.2)
RBC # FLD: 16 % — HIGH (ref 10.3–14.5)
RBC # FLD: 16 % — HIGH (ref 10.3–14.5)
SODIUM SERPL-SCNC: 144 MMOL/L — SIGNIFICANT CHANGE UP (ref 135–145)
SODIUM SERPL-SCNC: 144 MMOL/L — SIGNIFICANT CHANGE UP (ref 135–145)
T4 FREE SERPL-MCNC: 1.1 NG/DL — SIGNIFICANT CHANGE UP (ref 0.9–1.8)
T4 FREE SERPL-MCNC: 1.1 NG/DL — SIGNIFICANT CHANGE UP (ref 0.9–1.8)
THYROGLOB AB FLD IA-ACNC: <20 IU/ML — SIGNIFICANT CHANGE UP
THYROGLOB AB FLD IA-ACNC: <20 IU/ML — SIGNIFICANT CHANGE UP
THYROGLOB AB SERPL-ACNC: <20 IU/ML — SIGNIFICANT CHANGE UP
THYROGLOB AB SERPL-ACNC: <20 IU/ML — SIGNIFICANT CHANGE UP
THYROGLOB SERPL-MCNC: 26.3 NG/ML — SIGNIFICANT CHANGE UP (ref 1.6–59.9)
THYROGLOB SERPL-MCNC: 26.3 NG/ML — SIGNIFICANT CHANGE UP (ref 1.6–59.9)
TSH SERPL-MCNC: 3.55 UIU/ML — SIGNIFICANT CHANGE UP (ref 0.27–4.2)
TSH SERPL-MCNC: 3.55 UIU/ML — SIGNIFICANT CHANGE UP (ref 0.27–4.2)
WBC # BLD: 12.25 K/UL — HIGH (ref 3.8–10.5)
WBC # BLD: 12.25 K/UL — HIGH (ref 3.8–10.5)
WBC # FLD AUTO: 12.25 K/UL — HIGH (ref 3.8–10.5)
WBC # FLD AUTO: 12.25 K/UL — HIGH (ref 3.8–10.5)

## 2024-01-05 PROCEDURE — 99254 IP/OBS CNSLTJ NEW/EST MOD 60: CPT

## 2024-01-05 PROCEDURE — 99233 SBSQ HOSP IP/OBS HIGH 50: CPT

## 2024-01-05 PROCEDURE — 99232 SBSQ HOSP IP/OBS MODERATE 35: CPT

## 2024-01-05 RX ORDER — FLUDROCORTISONE ACETATE 0.1 MG/1
0.1 TABLET ORAL DAILY
Refills: 0 | Status: DISCONTINUED | OUTPATIENT
Start: 2024-01-05 | End: 2024-01-12

## 2024-01-05 RX ORDER — HYDROCORTISONE 20 MG
50 TABLET ORAL EVERY 12 HOURS
Refills: 0 | Status: COMPLETED | OUTPATIENT
Start: 2024-01-05 | End: 2024-01-06

## 2024-01-05 RX ORDER — LEVOTHYROXINE SODIUM 125 MCG
25 TABLET ORAL DAILY
Refills: 0 | Status: DISCONTINUED | OUTPATIENT
Start: 2024-01-06 | End: 2024-01-12

## 2024-01-05 RX ORDER — HYDROCORTISONE 20 MG
50 TABLET ORAL EVERY 12 HOURS
Refills: 0 | Status: DISCONTINUED | OUTPATIENT
Start: 2024-01-05 | End: 2024-01-05

## 2024-01-05 RX ADMIN — Medication 50 MILLIGRAM(S): at 18:41

## 2024-01-05 RX ADMIN — MIDODRINE HYDROCHLORIDE 5 MILLIGRAM(S): 2.5 TABLET ORAL at 19:40

## 2024-01-05 RX ADMIN — CALCITRIOL 0.25 MICROGRAM(S): 0.5 CAPSULE ORAL at 13:04

## 2024-01-05 RX ADMIN — FLUDROCORTISONE ACETATE 0.1 MILLIGRAM(S): 0.1 TABLET ORAL at 18:58

## 2024-01-05 RX ADMIN — Medication 1300 MILLIGRAM(S): at 14:56

## 2024-01-05 RX ADMIN — Medication 1 TABLET(S): at 13:04

## 2024-01-05 RX ADMIN — Medication 1 PACKET(S): at 05:24

## 2024-01-05 RX ADMIN — SODIUM CHLORIDE 3 MILLILITER(S): 9 INJECTION INTRAMUSCULAR; INTRAVENOUS; SUBCUTANEOUS at 06:38

## 2024-01-05 RX ADMIN — Medication 1 TABLET(S): at 18:40

## 2024-01-05 RX ADMIN — Medication 1300 MILLIGRAM(S): at 05:23

## 2024-01-05 RX ADMIN — IRON SUCROSE 110 MILLIGRAM(S): 20 INJECTION, SOLUTION INTRAVENOUS at 13:04

## 2024-01-05 RX ADMIN — PIPERACILLIN AND TAZOBACTAM 25 GRAM(S): 4; .5 INJECTION, POWDER, LYOPHILIZED, FOR SOLUTION INTRAVENOUS at 06:38

## 2024-01-05 RX ADMIN — Medication 1300 MILLIGRAM(S): at 21:11

## 2024-01-05 RX ADMIN — PIPERACILLIN AND TAZOBACTAM 25 GRAM(S): 4; .5 INJECTION, POWDER, LYOPHILIZED, FOR SOLUTION INTRAVENOUS at 18:40

## 2024-01-05 RX ADMIN — Medication 1 TABLET(S): at 05:24

## 2024-01-05 RX ADMIN — SODIUM CHLORIDE 3 MILLILITER(S): 9 INJECTION INTRAMUSCULAR; INTRAVENOUS; SUBCUTANEOUS at 21:15

## 2024-01-05 RX ADMIN — MIDODRINE HYDROCHLORIDE 5 MILLIGRAM(S): 2.5 TABLET ORAL at 05:24

## 2024-01-05 RX ADMIN — SODIUM CHLORIDE 3 MILLILITER(S): 9 INJECTION INTRAMUSCULAR; INTRAVENOUS; SUBCUTANEOUS at 14:26

## 2024-01-05 RX ADMIN — Medication 1 MILLIGRAM(S): at 13:04

## 2024-01-05 RX ADMIN — PANTOPRAZOLE SODIUM 40 MILLIGRAM(S): 20 TABLET, DELAYED RELEASE ORAL at 06:21

## 2024-01-05 RX ADMIN — MIDODRINE HYDROCHLORIDE 5 MILLIGRAM(S): 2.5 TABLET ORAL at 13:04

## 2024-01-05 RX ADMIN — Medication 50 MILLIGRAM(S): at 05:23

## 2024-01-05 NOTE — PROGRESS NOTE ADULT - SUBJECTIVE AND OBJECTIVE BOX
[facility] Division of Hospital Medicine    Chief Complaint:      SUBJECTIVE / OVERNIGHT EVENTS:    Patient seen and examined at bedside, overnight patient noted to be persistently bradycardic to 40's per telemetry, Seen by cardiology this AM, no junctional rhythm, only sinus bradycardia. Stopped midodrine, changed to florinef. Hydrocortisone decreased per Endocrine, will reattempt stim test when off steroids.      MEDICATIONS  (STANDING):  acetaminophen   IVPB .. 1000 milliGRAM(s) IV Intermittent once  calcitriol   Capsule 0.25 MICROGram(s) Oral daily  calcium carbonate    500 mG (Tums) Chewable 1 Tablet(s) Chew two times a day  epoetin dev (PROCRIT) Injectable 57401 Unit(s) SubCutaneous every 7 days  fludroCORTISONE 0.1 milliGRAM(s) Oral daily  folic acid 1 milliGRAM(s) Oral daily  hydrocortisone sodium succinate Injectable 50 milliGRAM(s) IV Push every 12 hours  iron sucrose IVPB 200 milliGRAM(s) IV Intermittent every 24 hours  midodrine. 10 milliGRAM(s) Oral once  multivitamin 1 Tablet(s) Oral daily  pantoprazole    Tablet 40 milliGRAM(s) Oral before breakfast  piperacillin/tazobactam IVPB.. 3.375 Gram(s) IV Intermittent every 12 hours  sodium bicarbonate 1300 milliGRAM(s) Oral three times a day  sodium chloride 0.9% lock flush 3 milliLiter(s) IV Push every 8 hours    MEDICATIONS  (PRN):  acetaminophen     Tablet .. 650 milliGRAM(s) Oral every 6 hours PRN Temp greater or equal to 38C (100.4F), Mild Pain (1 - 3)  albuterol    90 MICROgram(s) HFA Inhaler 2 Puff(s) Inhalation every 6 hours PRN Shortness of Breath and/or Wheezing  aluminum hydroxide/magnesium hydroxide/simethicone Suspension 30 milliLiter(s) Oral every 4 hours PRN Dyspepsia  guaiFENesin Oral Liquid (Sugar-Free) 100 milliGRAM(s) Oral every 6 hours PRN Cough  LORazepam   Injectable 1 milliGRAM(s) IV Push every 8 hours PRN Agitation  melatonin 3 milliGRAM(s) Oral at bedtime PRN Insomnia  ondansetron Injectable 4 milliGRAM(s) IV Push every 8 hours PRN Nausea and/or Vomiting        I&O's Summary      PHYSICAL EXAM:  Vital Signs Last 24 Hrs  T(C): 36.6 (05 Jan 2024 11:19), Max: 36.8 (04 Jan 2024 20:08)  T(F): 97.9 (05 Jan 2024 11:19), Max: 98.2 (04 Jan 2024 20:08)  HR: 49 (05 Jan 2024 11:19) (48 - 62)  BP: 106/63 (05 Jan 2024 11:19) (95/58 - 109/73)  BP(mean): --  RR: 18 (05 Jan 2024 11:19) (18 - 18)  SpO2: 99% (05 Jan 2024 11:19) (95% - 99%)    Parameters below as of 05 Jan 2024 08:59  Patient On (Oxygen Delivery Method): room air      General: Young female sitting in bed comfortably. No acute distress  HEENT: EOMI. Clear conjunctivae. Moist mucus membrane  Neck: Supple.   Chest: CTA bilaterally. No wheezing, rales or rhonchi.   Heart: Normal S1 & S2. RRR.   Abdomen: Non distended. Soft. Non-tender. + BS  Ext: No pedal edema. No calf tenderness   Neuro: AAO x 3. No focal deficit. No speech disorder  Skin: Warm and Dry  Psychiatry: Normal mood and affect    LABS:                        7.9    12.25 )-----------( 308      ( 05 Jan 2024 06:16 )             25.0     01-05    144  |  108  |  31.6<H>  ----------------------------<  141<H>  4.1   |  21.0<L>  |  3.97<H>    Ca    8.5      05 Jan 2024 06:16  Phos  4.9     01-05  Mg     1.8     01-05    TPro  5.9<L>  /  Alb  3.1<L>  /  TBili  <0.2<L>  /  DBili  x   /  AST  16  /  ALT  13  /  AlkPhos  43  01-04          Urinalysis Basic - ( 05 Jan 2024 06:16 )    Color: x / Appearance: x / SG: x / pH: x  Gluc: 141 mg/dL / Ketone: x  / Bili: x / Urobili: x   Blood: x / Protein: x / Nitrite: x   Leuk Esterase: x / RBC: x / WBC x   Sq Epi: x / Non Sq Epi: x / Bacteria: x        Culture - Blood (collected 02 Jan 2024 22:10)  Source: .Blood Blood-Peripheral  Preliminary Report (05 Jan 2024 03:02):    No growth at 48 Hours    Culture - Blood (collected 02 Jan 2024 22:10)  Source: .Blood Blood-Peripheral  Preliminary Report (05 Jan 2024 03:02):    No growth at 48 Hours      CAPILLARY BLOOD GLUCOSE            RADIOLOGY & ADDITIONAL TESTS:  Results Reviewed:   Imaging Personally Reviewed:  Electrocardiogram Personally Reviewed:         [facility] Division of Hospital Medicine    Chief Complaint:      SUBJECTIVE / OVERNIGHT EVENTS:    Patient seen and examined at bedside, overnight patient noted to be persistently bradycardic to 40's per telemetry, Seen by cardiology this AM, no junctional rhythm, only sinus bradycardia. Stopped midodrine, changed to florinef. Hydrocortisone decreased per Endocrine, will reattempt stim test when off steroids.      MEDICATIONS  (STANDING):  acetaminophen   IVPB .. 1000 milliGRAM(s) IV Intermittent once  calcitriol   Capsule 0.25 MICROGram(s) Oral daily  calcium carbonate    500 mG (Tums) Chewable 1 Tablet(s) Chew two times a day  epoetin dev (PROCRIT) Injectable 80078 Unit(s) SubCutaneous every 7 days  fludroCORTISONE 0.1 milliGRAM(s) Oral daily  folic acid 1 milliGRAM(s) Oral daily  hydrocortisone sodium succinate Injectable 50 milliGRAM(s) IV Push every 12 hours  iron sucrose IVPB 200 milliGRAM(s) IV Intermittent every 24 hours  midodrine. 10 milliGRAM(s) Oral once  multivitamin 1 Tablet(s) Oral daily  pantoprazole    Tablet 40 milliGRAM(s) Oral before breakfast  piperacillin/tazobactam IVPB.. 3.375 Gram(s) IV Intermittent every 12 hours  sodium bicarbonate 1300 milliGRAM(s) Oral three times a day  sodium chloride 0.9% lock flush 3 milliLiter(s) IV Push every 8 hours    MEDICATIONS  (PRN):  acetaminophen     Tablet .. 650 milliGRAM(s) Oral every 6 hours PRN Temp greater or equal to 38C (100.4F), Mild Pain (1 - 3)  albuterol    90 MICROgram(s) HFA Inhaler 2 Puff(s) Inhalation every 6 hours PRN Shortness of Breath and/or Wheezing  aluminum hydroxide/magnesium hydroxide/simethicone Suspension 30 milliLiter(s) Oral every 4 hours PRN Dyspepsia  guaiFENesin Oral Liquid (Sugar-Free) 100 milliGRAM(s) Oral every 6 hours PRN Cough  LORazepam   Injectable 1 milliGRAM(s) IV Push every 8 hours PRN Agitation  melatonin 3 milliGRAM(s) Oral at bedtime PRN Insomnia  ondansetron Injectable 4 milliGRAM(s) IV Push every 8 hours PRN Nausea and/or Vomiting        I&O's Summary      PHYSICAL EXAM:  Vital Signs Last 24 Hrs  T(C): 36.6 (05 Jan 2024 11:19), Max: 36.8 (04 Jan 2024 20:08)  T(F): 97.9 (05 Jan 2024 11:19), Max: 98.2 (04 Jan 2024 20:08)  HR: 49 (05 Jan 2024 11:19) (48 - 62)  BP: 106/63 (05 Jan 2024 11:19) (95/58 - 109/73)  BP(mean): --  RR: 18 (05 Jan 2024 11:19) (18 - 18)  SpO2: 99% (05 Jan 2024 11:19) (95% - 99%)    Parameters below as of 05 Jan 2024 08:59  Patient On (Oxygen Delivery Method): room air      General: Young female sitting in bed comfortably. No acute distress  HEENT: EOMI. Clear conjunctivae. Moist mucus membrane  Neck: Supple.   Chest: CTA bilaterally. No wheezing, rales or rhonchi.   Heart: Normal S1 & S2. RRR.   Abdomen: Non distended. Soft. Non-tender. + BS  Ext: No pedal edema. No calf tenderness   Neuro: AAO x 3. No focal deficit. No speech disorder  Skin: Warm and Dry  Psychiatry: Normal mood and affect    LABS:                        7.9    12.25 )-----------( 308      ( 05 Jan 2024 06:16 )             25.0     01-05    144  |  108  |  31.6<H>  ----------------------------<  141<H>  4.1   |  21.0<L>  |  3.97<H>    Ca    8.5      05 Jan 2024 06:16  Phos  4.9     01-05  Mg     1.8     01-05    TPro  5.9<L>  /  Alb  3.1<L>  /  TBili  <0.2<L>  /  DBili  x   /  AST  16  /  ALT  13  /  AlkPhos  43  01-04          Urinalysis Basic - ( 05 Jan 2024 06:16 )    Color: x / Appearance: x / SG: x / pH: x  Gluc: 141 mg/dL / Ketone: x  / Bili: x / Urobili: x   Blood: x / Protein: x / Nitrite: x   Leuk Esterase: x / RBC: x / WBC x   Sq Epi: x / Non Sq Epi: x / Bacteria: x        Culture - Blood (collected 02 Jan 2024 22:10)  Source: .Blood Blood-Peripheral  Preliminary Report (05 Jan 2024 03:02):    No growth at 48 Hours    Culture - Blood (collected 02 Jan 2024 22:10)  Source: .Blood Blood-Peripheral  Preliminary Report (05 Jan 2024 03:02):    No growth at 48 Hours      CAPILLARY BLOOD GLUCOSE            RADIOLOGY & ADDITIONAL TESTS:  Results Reviewed:   Imaging Personally Reviewed:  Electrocardiogram Personally Reviewed:

## 2024-01-05 NOTE — PROGRESS NOTE ADULT - ASSESSMENT
22 y/o female whose real name is Theresa Tsang. brought from [place] [place] for evaluation. Patient was apparently in her normal state of health at a [employer]omin[employer] event and shortly after eating rice/beans and meat started c/o abdominal pain and cramping and then vomited. Friends who witnessed it said they called EMS due to patient being very agitated. No one else reportedly was sick from eating the same food. No recent illnesses, travel, or sick contacts reported. Patient has never used illicit drugs and does not use alcohol. She has a hx of "kidney problems" and used to be on medications for it but self discontinued " a while ago." In th field Patient given Versed and then Ativan in ED to allow for exam and labs. ED attending states patient denying any trauma, or assault. No obvious injury on exam. Labs with Hbg of 8.6 and Cr. of 3.95 with reported baseline of 2.5. No reported mental health history. Patient now at mental baseline however hypotension in setting of possible AI? Sinus bradycardia and awaiting MR abdomen for renal lesions due to poor outpatient follow up.     Hypotension  Suspected Adrenal Insufficiency  -Follow Cortisol however given patient was on steroids, results inaccurate   Endocrine consulted, appreciate recs, Hydrocortisone adjusted to 50 mg now q12, continue weening    -Continue Solu-cortef and midodrine DC'd changed to FLorinef due to sinus bradycardia as per Cardiology, however given Hydrocortisone may be unneeded   -ICU Consult appreciated    AMS / Agitation  Resolved   -No prodrome, related to food ingestion by history  -Panic attack? Pain related?   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    24 y/o female whose real name is Theresa Tsang. brought from [facility] for evaluation. Patient was apparently in her normal state of health at a [employer] event and shortly after eating rice/beans and meat started c/o abdominal pain and cramping and then vomited. Friends who witnessed it said they called EMS due to patient being very agitated. No one else reportedly was sick from eating the same food. No recent illnesses, travel, or sick contacts reported. Patient has never used illicit drugs and does not use alcohol. She has a hx of "kidney problems" and used to be on medications for it but self discontinued " a while ago." In th field Patient given Versed and then Ativan in ED to allow for exam and labs. ED attending states patient denying any trauma, or assault. No obvious injury on exam. Labs with Hbg of 8.6 and Cr. of 3.95 with reported baseline of 2.5. No reported mental health history. Patient now at mental baseline however hypotension in setting of possible AI? Sinus bradycardia and awaiting MR abdomen for renal lesions due to poor outpatient follow up.     Hypotension  Suspected Adrenal Insufficiency  -Follow Cortisol however given patient was on steroids, results inaccurate   Endocrine consulted, appreciate recs, Hydrocortisone adjusted to 50 mg now q12, continue weening    -Continue Solu-cortef and midodrine DC'd changed to FLorinef due to sinus bradycardia as per Cardiology, however given Hydrocortisone may be unneeded   -ICU Consult appreciated    AMS / Agitation  Resolved   -No prodrome, related to food ingestion by history  -Panic attack? Pain related?   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

## 2024-01-05 NOTE — PROGRESS NOTE ADULT - ASSESSMENT
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  CT scan: Atrophic kidneys with bilateral indeterminate renal masses  - avoid potential nephrotoxins  - cont NaHCO3  - monitor labs  - await MRI abdomen  - pt eventually will require RRT; consider AVF creation and renal transplant evaluation once stable    Hypotension: r/o adrenal insufficiency  - Hydrocortisone as per Endocrine  - cont Midodrine    RO: iPTH 73  - cont CaCO3 and Rocaltrol  - check Vitamin D    Anemia: +CKD + low Fe  - cont MARY and IV Fe  - target Hgb > 10.0

## 2024-01-05 NOTE — CONSULT NOTE ADULT - NS ATTEND AMEND GEN_ALL_CORE FT
seen with above,    23F [place]ian-speaking history significant for CKD, anemia, following with outside nephrologist admitted since 1/1/23 after found with altered mental state with agitation in the [employer] after eating meals, EMS brought patient to the ER required sedation, lab work with Cr. of 4, hospital course found with code sepsis severe febrile episode to 105 and sinus tach 120s, with hypotension SBP 70s placed on stress dose IV hydrocortisone and midodrine, +COVID from 1/3, CT ab/pel with bilateral atropic kidneys and indeterminate renal masses and RP lymphadenopathy, noted on telemetry with sinus keenan to 40s, Echo done found with moderate size secundum ASD with L-to-R shunting and RA/RV enlargement    -TSH normal, unclear etiology of sinus bradycardia but would minimize offending medication, consider stopping midodrine, continue telemetry monitoring while inpatient  -congenital secundum ASD with R-heart enlargement, need office follow up once acute medical issue resolve to consider elective ASD closure in the future as outpatient  -MRI pending for renal masses assessment, possible paraneoplastic syndrome?  -nephrology following for future renal replacement therapy needs        Antonio Palmer DO, [facility]  Faculty Non-Invasive Cardiologist  [phone] seen with above,    23F Thai-speaking history significant for CKD, anemia, following with outside nephrologist admitted since 1/1/23 after found with altered mental state with agitation in the [place] after eating meals, EMS brought patient to the ER required sedation, lab work with Cr. of 4, hospital course found with code sepsis severe febrile episode to 105 and sinus tach 120s, with hypotension SBP 70s placed on stress dose IV hydrocortisone and midodrine, +COVID from 1/3, CT ab/pel with bilateral atropic kidneys and indeterminate renal masses and RP lymphadenopathy, noted on telemetry with sinus keenan to 40s, Echo done found with moderate size secundum ASD with L-to-R shunting and RA/RV enlargement    -TSH normal, unclear etiology of sinus bradycardia but would minimize offending medication, consider stopping midodrine, continue telemetry monitoring while inpatient  -congenital secundum ASD with R-heart enlargement, need office follow up once acute medical issue resolve to consider elective ASD closure in the future as outpatient  -MRI pending for renal masses assessment, possible paraneoplastic syndrome?  -nephrology following for future renal replacement therapy needs        Antonio Palmer DO, [facility]  Faculty Non-Invasive Cardiologist  [phone]

## 2024-01-05 NOTE — CONSULT NOTE ADULT - PROBLEM SELECTOR RECOMMENDATION 9
Rhythm strips reviewed all sinus  Tsh 3.5  check t3 t4  thyroid song dany per Endo  On midodrine 5mg tid  would try and titrate down due to bradycardia which occurs with midodrine  Can start florinef 0.1mg qd  continue to monitor  Patent was instruction to call if she gets lightheaded or dizzy  Echo with normal eF moderate  pulmonic regurgitation. tricuspid regurg  follow up with Cardio out patient for repeat echo  work up for adrenal insuff once off steroids Rhythm strips reviewed all sinus  Tsh 3.5  check t3 t4  thyroid song dany per Endo  Hypotensive On midodrine 5mg tid  would try and titrate down due to bradycardia which occurs with midodrine  Can start florinef 0.1mg qd  continue to monitor  Patent was instruction to call if she gets lightheaded or dizzy  Echo with normal eF moderate  pulmonic regurgitation. tricuspid regurg  follow up with Cardio out patient for repeat echo  work up for adrenal insuff once off steroids

## 2024-01-05 NOTE — CONSULT NOTE ADULT - SUBJECTIVE AND OBJECTIVE BOX
[facility]                                              CARDIOLOGY AT [address], [place] [address]                                             Telephone: [phone]. Fax:[phone]                                                         CARDIOLOGY CONSULTATION NOTE                                                                                             Consult requested by:  Dr Mathew  History obtained by: Patient and medical record  Community Cardiologist: None   obtained: Yes [  ] No [  ]  Reason for Consultation: Bradycardia  Avialable out pt records reviewed: Yes [  ] No x ]    : Tita Clark [phone] answer at this time  (2024 05:57)    Hx obtained via [employer]  This is a 24 y/o female with PMH of Hyperthyroidism and CKD who was found to be  acting strange at a [employer] event. Patient states she felt so weak she thought she was dying.  Was in usoh  then  after eating rice/beans and meat started c/o abdominal pain   Unclear if there was a syncopal event or not.  Was tachycardic when ems arrived and found to have hgb 8.6 and Creat 3.95  Baseline creat 2.5  Chart states that she was on meds for kidney failure but she self d/checo these  Had a code sepsis with temp 105  found to have Covid-19.    Hr was in the 70's now sinus keenan in the mid 40s  patient denies any dizziness       CARDIAC TESTING   ECHO:  < from: TTE W or WO Ultrasound Enhancing Agent (24 @ 12:49) >   1. Left ventricular cavity is normal. Left ventricular wall thickness is normal. The interventricular septum is flattened in systole consistent with right ventricular pressure overload. Left ventricular systolic function is normal with an ejection fraction visually estimated at 55 to 60 %.   2. Normal left ventricular diastolic function.   3. Mildly enlarged right ventricular cavity size and systolic function.   4. The left atrium is normal.   5. The right atrium is mildly dilated in size.   6. Thickened mitral valve leaflets. Mild mitral valve leaflet calcification.   7. Mild mitral regurgitation.   8. Trileaflet aortic valve with normal systolic excursion.   9. Moderate tricuspid regurgitation.  10. Moderate pulmonic regurgitation.  11. No pericardial effusion seen.  12. Secundum atrial septal defect with bidrectional shunting.  13. Estimated pulmonary artery systolic pressure is 31 mmHg, consistent with normal pulmonary artery pressure.      PAST MEDICAL HISTORY  Chronic kidney disease, unspecified CKD stage  Hyperthyroidism    PAST SURGICAL HISTORY  H/O  section    SOCIAL HISTORY:  Denies smoking/alcohol/drugs    Family History of Cardiovascular Disease:  Yes [  ] No [x  ]  Coronary Artery Disease in first degree relative: Yes [  ] No [x  ]  Sudden Cardiac Death in First degree relative: Yes [  ] No [  ]    HOME MEDICATIONS:      CURRENT MEDICATIONS:  midodrine. 5 milliGRAM(s) Oral three times a day  midodrine. 10 milliGRAM(s) Oral once  acetaminophen   IVPB ..  calcium carbonate    500 mG (Tums) Chewable  pantoprazole    Tablet  calcitriol   Capsule  epoetin dev (PROCRIT) Injectable  folic acid  hydrocortisone sodium succinate Injectable  iron sucrose IVPB  multivitamin  piperacillin/tazobactam IVPB..  sodium bicarbonate    ALLERGIES:  NKDA      REVIEW OF SYMPTOMS:   CONSTITUTIONAL: No fever, no chills, no weight loss, no weight gain, no fatigue   ENMT:  No vertigo; No sinus or throat pain  NECK: No pain or stiffness  CARDIOVASCULAR: No chest pain, no dyspnea, no syncope/presyncope, no palpitations, no dizziness, no Orthopnea, no Paroxsymal nocturnal dyspnea  RESPIRATORY: no Shortness of breath, no cough, no wheezing  : No dysuria, no hematuria   GI: complaining of abd discomfort  NEURO: No headache, no slurred speech   MUSCULOSKELETAL: No joint pain or swelling; No muscle, back, or extremity pain  PSYCH: No agitation, no anxiety.    ALL OTHER REVIEW OF SYSTEMS ARE NEGATIVE.    Vital Signs Last 24 Hrs  T(C): 36.8 (2024 08:59), Max: 36.8 (2024 20:08)  T(F): 98.2 (2024 08:59), Max: 98.2 (2024 20:08)  HR: 62 (2024 08:59) (48 - 84)  BP: 106/67 (2024 08:59) (95/58 - 126/84)  BP(mean): --  RR: 18 (2024 08:59) (18 - 18)  SpO2: 95% (2024 08:59) (95% - 97%)    Parameters below as of 2024 08:59  Patient On (Oxygen Delivery Method): room air  INTAKE AND OUTPUT:     PHYSICAL EXAM:  Constitutional: Comfortable . No acute distress.   HEENT: Atraumatic and normocephalic , neck is supple . no JVD. No carotid bruit.  CNS: A&Ox3. No focal deficits.   Respiratory: CTAB, unlabored   Cardiovascular: RRR normal s1 s2. No murmur. No rubs or gallop.  Gastrointestinal: Soft, non-tender. +Bowel sounds.   MSK: full ROM extremities x 4  Extremities: No edema. No cyanosis   Psychiatric: Calm . no agitation.   Skin: Warm and dry, no ulcers on extremities       LABS:                        7.9    12.25 )-----------( 308      ( 2024 06:16 )             25.0     01-05    144  |  108  |  31.6<H>  ----------------------------<  141<H>  4.1   |  21.0<L>  |  3.97<H>    Ca    8.5      2024 06:16  Phos  4.9     01-05  Mg     1.8     01-05    TPro  5.9<L>  /  Alb  3.1<L>  /  TBili  <0.2<L>  /  DBili  x   /  AST  16  /  ALT  13  /  AlkPhos  43  01-04      ;p-BNP=    Urinalysis Basic - ( 2024 06:16 )    Color: x / Appearance: x / SG: x / pH: x  Gluc: 141 mg/dL / Ketone: x  / Bili: x / Urobili: x   Blood: x / Protein: x / Nitrite: x   Leuk Esterase: x / RBC: x / WBC x   Sq Epi: x / Non Sq Epi: x / Bacteria: x    INTERPRETATION OF TELEMETRY:     ECG: Sinus keenan incomplete RBBB t wave inversion v2  Prior ECG: Yes [  ] No [  ]      RADIOLOGY & ADDITIONAL STUDIES:    X-ray:  reviewed by me. [facility]                                                [facility]                                              CARDIOLOGY AT [address], [place] [address]                                             Telephone: [phone]. Fax:[phone]                                                         CARDIOLOGY CONSULTATION NOTE                                                                                             Consult requested by:  Dr Mathew  History obtained by: Patient and medical record  Community Cardiologist: None   obtained: Yes [  ] No [  ]  Reason for Consultation: Bradycardia  Avialable out pt records reviewed: Yes [  ] No x ]    : Tita Clark [phone] answer at this time  (2024 05:57)    Hx obtained via [employer]  This is a 22 y/o female with PMH of Hyperthyroidism and CKD who was found to be  acting strange at a [employer] event. Patient states she felt so weak she thought she was dying.  Was in usoh  then  after eating rice/beans and meat started c/o abdominal pain   Unclear if there was a syncopal event or not.  Was tachycardic when ems arrived and found to have hgb 8.6 and Creat 3.95  Baseline creat 2.5  Chart states that she was on meds for kidney failure but she self d/checo these  Had a code sepsis with temp 105  found to have Covid-19.    Hr was in the 70's now sinus keenan in the mid 40s  patient denies any dizziness       CARDIAC TESTING   ECHO:  < from: TTE W or WO Ultrasound Enhancing Agent (24 @ 12:49) >   1. Left ventricular cavity is normal. Left ventricular wall thickness is normal. The interventricular septum is flattened in systole consistent with right ventricular pressure overload. Left ventricular systolic function is normal with an ejection fraction visually estimated at 55 to 60 %.   2. Normal left ventricular diastolic function.   3. Mildly enlarged right ventricular cavity size and systolic function.   4. The left atrium is normal.   5. The right atrium is mildly dilated in size.   6. Thickened mitral valve leaflets. Mild mitral valve leaflet calcification.   7. Mild mitral regurgitation.   8. Trileaflet aortic valve with normal systolic excursion.   9. Moderate tricuspid regurgitation.  10. Moderate pulmonic regurgitation.  11. No pericardial effusion seen.  12. Secundum atrial septal defect with bidrectional shunting.  13. Estimated pulmonary artery systolic pressure is 31 mmHg, consistent with normal pulmonary artery pressure.      PAST MEDICAL HISTORY  Chronic kidney disease, unspecified CKD stage  Hyperthyroidism    PAST SURGICAL HISTORY  H/O  section    SOCIAL HISTORY:  Denies smoking/alcohol/drugs    Family History of Cardiovascular Disease:  Yes [  ] No [x  ]  Coronary Artery Disease in first degree relative: Yes [  ] No [x  ]  Sudden Cardiac Death in First degree relative: Yes [  ] No [  ]    HOME MEDICATIONS:      CURRENT MEDICATIONS:  midodrine. 5 milliGRAM(s) Oral three times a day  midodrine. 10 milliGRAM(s) Oral once  acetaminophen   IVPB ..  calcium carbonate    500 mG (Tums) Chewable  pantoprazole    Tablet  calcitriol   Capsule  epoetin dev (PROCRIT) Injectable  folic acid  hydrocortisone sodium succinate Injectable  iron sucrose IVPB  multivitamin  piperacillin/tazobactam IVPB..  sodium bicarbonate    ALLERGIES:  NKDA      REVIEW OF SYMPTOMS:   CONSTITUTIONAL: No fever, no chills, no weight loss, no weight gain, no fatigue   ENMT:  No vertigo; No sinus or throat pain  NECK: No pain or stiffness  CARDIOVASCULAR: No chest pain, no dyspnea, no syncope/presyncope, no palpitations, no dizziness, no Orthopnea, no Paroxsymal nocturnal dyspnea  RESPIRATORY: no Shortness of breath, no cough, no wheezing  : No dysuria, no hematuria   GI: complaining of abd discomfort  NEURO: No headache, no slurred speech   MUSCULOSKELETAL: No joint pain or swelling; No muscle, back, or extremity pain  PSYCH: No agitation, no anxiety.    ALL OTHER REVIEW OF SYSTEMS ARE NEGATIVE.    Vital Signs Last 24 Hrs  T(C): 36.8 (2024 08:59), Max: 36.8 (2024 20:08)  T(F): 98.2 (2024 08:59), Max: 98.2 (2024 20:08)  HR: 62 (2024 08:59) (48 - 84)  BP: 106/67 (2024 08:59) (95/58 - 126/84)  BP(mean): --  RR: 18 (2024 08:59) (18 - 18)  SpO2: 95% (2024 08:59) (95% - 97%)    Parameters below as of 2024 08:59  Patient On (Oxygen Delivery Method): room air  INTAKE AND OUTPUT:     PHYSICAL EXAM:  Constitutional: Comfortable . No acute distress.   HEENT: Atraumatic and normocephalic , neck is supple . no JVD. No carotid bruit.  CNS: A&Ox3. No focal deficits.   Respiratory: CTAB, unlabored   Cardiovascular: RRR normal s1 s2. No murmur. No rubs or gallop.  Gastrointestinal: Soft, non-tender. +Bowel sounds.   MSK: full ROM extremities x 4  Extremities: No edema. No cyanosis   Psychiatric: Calm . no agitation.   Skin: Warm and dry, no ulcers on extremities       LABS:                        7.9    12.25 )-----------( 308      ( 2024 06:16 )             25.0     01-05    144  |  108  |  31.6<H>  ----------------------------<  141<H>  4.1   |  21.0<L>  |  3.97<H>    Ca    8.5      2024 06:16  Phos  4.9     01-05  Mg     1.8     01-05    TPro  5.9<L>  /  Alb  3.1<L>  /  TBili  <0.2<L>  /  DBili  x   /  AST  16  /  ALT  13  /  AlkPhos  43  01-04      ;p-BNP=    Urinalysis Basic - ( 2024 06:16 )    Color: x / Appearance: x / SG: x / pH: x  Gluc: 141 mg/dL / Ketone: x  / Bili: x / Urobili: x   Blood: x / Protein: x / Nitrite: x   Leuk Esterase: x / RBC: x / WBC x   Sq Epi: x / Non Sq Epi: x / Bacteria: x    INTERPRETATION OF TELEMETRY:     ECG: Sinus keenan incomplete RBBB t wave inversion v2  Prior ECG: Yes [  ] No [  ]      RADIOLOGY & ADDITIONAL STUDIES:    X-ray:  reviewed by me. [facility]

## 2024-01-05 NOTE — PROGRESS NOTE ADULT - NS ATTEST RISK PROBLEM GEN_ALL_CORE FT
Young female with significant renal failure, hypotension possibly adrenal insufficiency, Consulted Endocrine, discussed with Nephrology, continue to monitor BP.
Young female with significant renal failure, hypotension possibly adrenal insufficiency, Consulted Endocrine, discussed with Nephrology, continue to monitor BP. Sinus Bradycardia with Secundum ASD in need of closure outpatient per Cardiology
Hypotension.  Anemia.  Hypocalcemia.

## 2024-01-05 NOTE — PROGRESS NOTE ADULT - NS ATTEND AMEND GEN_ALL_CORE FT
plan discussed with NP and changes incorporated in the note.    agree with the plan above  taper to off for tomorrow  am cortisol monday morning  can still on monday

## 2024-01-05 NOTE — PROGRESS NOTE ADULT - SUBJECTIVE AND OBJECTIVE BOX
NEPHROLOGY INTERVAL HPI/OVERNIGHT EVENTS:  pt clinically stable  no adverse overnight events reported    MEDICATIONS  (STANDING):  acetaminophen   IVPB .. 1000 milliGRAM(s) IV Intermittent once  calcitriol   Capsule 0.25 MICROGram(s) Oral daily  calcium carbonate    500 mG (Tums) Chewable 1 Tablet(s) Chew two times a day  epoetin dev (PROCRIT) Injectable 45227 Unit(s) SubCutaneous every 7 days  folic acid 1 milliGRAM(s) Oral daily  hydrocortisone sodium succinate Injectable 50 milliGRAM(s) IV Push every 8 hours  iron sucrose IVPB 200 milliGRAM(s) IV Intermittent every 24 hours  midodrine. 5 milliGRAM(s) Oral three times a day  midodrine. 10 milliGRAM(s) Oral once  multivitamin 1 Tablet(s) Oral daily  pantoprazole    Tablet 40 milliGRAM(s) Oral before breakfast  piperacillin/tazobactam IVPB.. 3.375 Gram(s) IV Intermittent every 12 hours  sodium bicarbonate 1300 milliGRAM(s) Oral three times a day  sodium chloride 0.9% lock flush 3 milliLiter(s) IV Push every 8 hours    MEDICATIONS  (PRN):  acetaminophen     Tablet .. 650 milliGRAM(s) Oral every 6 hours PRN Temp greater or equal to 38C (100.4F), Mild Pain (1 - 3)  albuterol    90 MICROgram(s) HFA Inhaler 2 Puff(s) Inhalation every 6 hours PRN Shortness of Breath and/or Wheezing  aluminum hydroxide/magnesium hydroxide/simethicone Suspension 30 milliLiter(s) Oral every 4 hours PRN Dyspepsia  guaiFENesin Oral Liquid (Sugar-Free) 100 milliGRAM(s) Oral every 6 hours PRN Cough  LORazepam   Injectable 1 milliGRAM(s) IV Push every 8 hours PRN Agitation  melatonin 3 milliGRAM(s) Oral at bedtime PRN Insomnia  ondansetron Injectable 4 milliGRAM(s) IV Push every 8 hours PRN Nausea and/or Vomiting      Allergies    No Known Allergies          Vital Signs Last 24 Hrs  T(C): 36.7 (05 Jan 2024 04:38), Max: 36.8 (04 Jan 2024 20:08)  T(F): 98.1 (05 Jan 2024 04:38), Max: 98.2 (04 Jan 2024 20:08)  HR: 48 (05 Jan 2024 04:38) (48 - 84)  BP: 95/58 (05 Jan 2024 04:38) (95/58 - 126/84)  BP(mean): --  RR: 18 (05 Jan 2024 04:38) (18 - 18)  SpO2: 95% (05 Jan 2024 04:38) (95% - 97%)    Parameters below as of 05 Jan 2024 04:38  Patient On (Oxygen Delivery Method): room air        PHYSICAL EXAM:  GENERAL: Comfortable  HEENT: No periorbital edema  NECK: Supple; no JVD  NERVOUS SYSTEM:  Alert & Oriented X3  CHEST/LUNG: Clear  bilaterally  HEART: Regular rate and rhythm  ABDOMEN: Soft, Nontender,  +BS  EXTREMITIES: no dependent edema    LABS:                        7.8    13.13 )-----------( 273      ( 04 Jan 2024 06:34 )             25.7     01-04    143  |  109<H>  |  24.9<H>  ----------------------------<  117<H>  4.0   |  17.0<L>  |  3.45<H>      Creatinine: 3.95 mg/dL (01.01.24)    Ca    8.9      04 Jan 2024 06:34  Phos  2.1     01-03    TPro  5.9<L>  /  Alb  3.1<L>  /  TBili  <0.2<L>  /  DBili  x   /  AST  16  /  ALT  13  /  AlkPhos  43  01-04      Urinalysis Basic - ( 04 Jan 2024 06:34 )    Color: x / Appearance: x / SG: x / pH: x  Gluc: 117 mg/dL / Ketone: x  / Bili: x / Urobili: x   Blood: x / Protein: x / Nitrite: x   Leuk Esterase: x / RBC: x / WBC x   Sq Epi: x / Non Sq Epi: x / Bacteria: x          RADIOLOGY & ADDITIONAL TESTS:  < from: US Kidney and Bladder (01.01.24 @ 08:46) >  ACC: [id number] EXAM:  US KIDNEYS AND BLADDER   ORDERED BY: LORRAINE KIM     PROCEDURE DATE:  01/01/2024          INTERPRETATION:  CLINICAL INFORMATION: Worsening renal failure.    COMPARISON: None available.    TECHNIQUE: Sonography of thekidneys and bladder.    FINDINGS:  Right kidney: 6.0 cm. Atrophic. No hydronephrosis or calculi. Upper pole   cyst measuring 0.8 x 0.8 x 0.8 cm. Midpole isoechoic mass versus   prominent cortical lobulation measuring 2.6 x 2.6 x 2.6 cm.    Left kidney: 7.2 cm. Atrophic. No hydronephrosis or calculi. Upper pole   cyst measuring 0.9 x 0.8 x 0.7 cm.    Urinary bladder: Bilateral urinary jets visualized.    IMPRESSION:  Atrophic kidneys. No hydronephrosis.    Right renal mass versus prominent cortical lobulation. Recommend renal   protocol CT or MRI for further evaluation when clinically feasible.    < end of copied text >      < from: CT Abdomen and Pelvis No Cont (01.01.24 @ 11:59) >    ACC: [id number] EXAM:  CT ABDOMEN AND PELVIS   ORDERED BY: MAGALI NGUYEN     PROCEDURE DATE:  01/01/2024          INTERPRETATION:  CLINICAL INFORMATION: Acute kidney injury, abnormal   renal ultrasound.    COMPARISON: Ultrasound 1/1/2024.    CONTRAST/COMPLICATIONS:  IV Contrast: None  Oral Contrast: None  Complications: None reported    PROCEDURE:  CT of the Abdomen and Pelvis was performed.  Sagittal and coronal reformats were performed.    FINDINGS:    Evaluation of the solid organs and vasculature is limited in the absence   of intravenous contrast.  LOWER CHEST: Cardiomegaly.    LIVER: Within normal limits.  BILE DUCTS: Normal caliber.  GALLBLADDER: Within normal limits.  SPLEEN: Within normal limits.  PANCREAS: Within normal limits.  ADRENALS: Within normal limits.  KIDNEYS/URETERS: Both kidneys are atrophic with multiple areas of   scarring as well as prominent/masslike areas, measuring up to 2.9 cm in   the right upper kidney, corresponding to that seen on ultrasound. Similar   masslike finding noted in the left upper pole, measuring 2.7 cm. These   are indeterminate without IV contrast. No renal calculi or hydronephrosis.    BLADDER: Within normal limits.  REPRODUCTIVE ORGANS: Uterus and adnexa within normal limits.    BOWEL: Nobowel obstruction. Appendix is normal.  PERITONEUM: No ascites.  VESSELS: Within normal limits.  RETROPERITONEUM/LYMPH NODES: Mildly prominent para-aortic nodes measure   up to 1.6 x 1.2 cm (3, 73) with suggestion of fatty hilum.  ABDOMINAL WALL: Within normal limits.  BONES: Within normal limits.    IMPRESSION:  Atrophic kidneys with bilateral indeterminate renal masses, which cannot   be further characterized without contrast. Consider contrast enhanced CT   or MRI when clinically feasible.    Mildly prominent retroperitoneal lymph nodes.    Cardiomegaly    < end of copied text >   NEPHROLOGY INTERVAL HPI/OVERNIGHT EVENTS:  pt clinically stable  no adverse overnight events reported    MEDICATIONS  (STANDING):  acetaminophen   IVPB .. 1000 milliGRAM(s) IV Intermittent once  calcitriol   Capsule 0.25 MICROGram(s) Oral daily  calcium carbonate    500 mG (Tums) Chewable 1 Tablet(s) Chew two times a day  epoetin dev (PROCRIT) Injectable 05604 Unit(s) SubCutaneous every 7 days  folic acid 1 milliGRAM(s) Oral daily  hydrocortisone sodium succinate Injectable 50 milliGRAM(s) IV Push every 8 hours  iron sucrose IVPB 200 milliGRAM(s) IV Intermittent every 24 hours  midodrine. 5 milliGRAM(s) Oral three times a day  midodrine. 10 milliGRAM(s) Oral once  multivitamin 1 Tablet(s) Oral daily  pantoprazole    Tablet 40 milliGRAM(s) Oral before breakfast  piperacillin/tazobactam IVPB.. 3.375 Gram(s) IV Intermittent every 12 hours  sodium bicarbonate 1300 milliGRAM(s) Oral three times a day  sodium chloride 0.9% lock flush 3 milliLiter(s) IV Push every 8 hours    MEDICATIONS  (PRN):  acetaminophen     Tablet .. 650 milliGRAM(s) Oral every 6 hours PRN Temp greater or equal to 38C (100.4F), Mild Pain (1 - 3)  albuterol    90 MICROgram(s) HFA Inhaler 2 Puff(s) Inhalation every 6 hours PRN Shortness of Breath and/or Wheezing  aluminum hydroxide/magnesium hydroxide/simethicone Suspension 30 milliLiter(s) Oral every 4 hours PRN Dyspepsia  guaiFENesin Oral Liquid (Sugar-Free) 100 milliGRAM(s) Oral every 6 hours PRN Cough  LORazepam   Injectable 1 milliGRAM(s) IV Push every 8 hours PRN Agitation  melatonin 3 milliGRAM(s) Oral at bedtime PRN Insomnia  ondansetron Injectable 4 milliGRAM(s) IV Push every 8 hours PRN Nausea and/or Vomiting      Allergies    No Known Allergies          Vital Signs Last 24 Hrs  T(C): 36.7 (05 Jan 2024 04:38), Max: 36.8 (04 Jan 2024 20:08)  T(F): 98.1 (05 Jan 2024 04:38), Max: 98.2 (04 Jan 2024 20:08)  HR: 48 (05 Jan 2024 04:38) (48 - 84)  BP: 95/58 (05 Jan 2024 04:38) (95/58 - 126/84)  BP(mean): --  RR: 18 (05 Jan 2024 04:38) (18 - 18)  SpO2: 95% (05 Jan 2024 04:38) (95% - 97%)    Parameters below as of 05 Jan 2024 04:38  Patient On (Oxygen Delivery Method): room air        PHYSICAL EXAM:  GENERAL: Comfortable  HEENT: No periorbital edema  NECK: Supple; no JVD  NERVOUS SYSTEM:  Alert & Oriented X3  CHEST/LUNG: Clear  bilaterally  HEART: Regular rate and rhythm  ABDOMEN: Soft, Nontender,  +BS  EXTREMITIES: no dependent edema    LABS:                        7.8    13.13 )-----------( 273      ( 04 Jan 2024 06:34 )             25.7     01-04    143  |  109<H>  |  24.9<H>  ----------------------------<  117<H>  4.0   |  17.0<L>  |  3.45<H>      Creatinine: 3.95 mg/dL (01.01.24)    Ca    8.9      04 Jan 2024 06:34  Phos  2.1     01-03    TPro  5.9<L>  /  Alb  3.1<L>  /  TBili  <0.2<L>  /  DBili  x   /  AST  16  /  ALT  13  /  AlkPhos  43  01-04      Urinalysis Basic - ( 04 Jan 2024 06:34 )    Color: x / Appearance: x / SG: x / pH: x  Gluc: 117 mg/dL / Ketone: x  / Bili: x / Urobili: x   Blood: x / Protein: x / Nitrite: x   Leuk Esterase: x / RBC: x / WBC x   Sq Epi: x / Non Sq Epi: x / Bacteria: x          RADIOLOGY & ADDITIONAL TESTS:  < from: US Kidney and Bladder (01.01.24 @ 08:46) >  ACC: [id number] EXAM:  US KIDNEYS AND BLADDER   ORDERED BY: LORRAINE KIM     PROCEDURE DATE:  01/01/2024          INTERPRETATION:  CLINICAL INFORMATION: Worsening renal failure.    COMPARISON: None available.    TECHNIQUE: Sonography of thekidneys and bladder.    FINDINGS:  Right kidney: 6.0 cm. Atrophic. No hydronephrosis or calculi. Upper pole   cyst measuring 0.8 x 0.8 x 0.8 cm. Midpole isoechoic mass versus   prominent cortical lobulation measuring 2.6 x 2.6 x 2.6 cm.    Left kidney: 7.2 cm. Atrophic. No hydronephrosis or calculi. Upper pole   cyst measuring 0.9 x 0.8 x 0.7 cm.    Urinary bladder: Bilateral urinary jets visualized.    IMPRESSION:  Atrophic kidneys. No hydronephrosis.    Right renal mass versus prominent cortical lobulation. Recommend renal   protocol CT or MRI for further evaluation when clinically feasible.    < end of copied text >      < from: CT Abdomen and Pelvis No Cont (01.01.24 @ 11:59) >    ACC: [id number] EXAM:  CT ABDOMEN AND PELVIS   ORDERED BY: MAGALI NGUYEN     PROCEDURE DATE:  01/01/2024          INTERPRETATION:  CLINICAL INFORMATION: Acute kidney injury, abnormal   renal ultrasound.    COMPARISON: Ultrasound 1/1/2024.    CONTRAST/COMPLICATIONS:  IV Contrast: None  Oral Contrast: None  Complications: None reported    PROCEDURE:  CT of the Abdomen and Pelvis was performed.  Sagittal and coronal reformats were performed.    FINDINGS:    Evaluation of the solid organs and vasculature is limited in the absence   of intravenous contrast.  LOWER CHEST: Cardiomegaly.    LIVER: Within normal limits.  BILE DUCTS: Normal caliber.  GALLBLADDER: Within normal limits.  SPLEEN: Within normal limits.  PANCREAS: Within normal limits.  ADRENALS: Within normal limits.  KIDNEYS/URETERS: Both kidneys are atrophic with multiple areas of   scarring as well as prominent/masslike areas, measuring up to 2.9 cm in   the right upper kidney, corresponding to that seen on ultrasound. Similar   masslike finding noted in the left upper pole, measuring 2.7 cm. These   are indeterminate without IV contrast. No renal calculi or hydronephrosis.    BLADDER: Within normal limits.  REPRODUCTIVE ORGANS: Uterus and adnexa within normal limits.    BOWEL: Nobowel obstruction. Appendix is normal.  PERITONEUM: No ascites.  VESSELS: Within normal limits.  RETROPERITONEUM/LYMPH NODES: Mildly prominent para-aortic nodes measure   up to 1.6 x 1.2 cm (3, 73) with suggestion of fatty hilum.  ABDOMINAL WALL: Within normal limits.  BONES: Within normal limits.    IMPRESSION:  Atrophic kidneys with bilateral indeterminate renal masses, which cannot   be further characterized without contrast. Consider contrast enhanced CT   or MRI when clinically feasible.    Mildly prominent retroperitoneal lymph nodes.    Cardiomegaly    < end of copied text >

## 2024-01-05 NOTE — CONSULT NOTE ADULT - ASSESSMENT
24 y/o female with PMH of Hyperthyroidism and CKD who was found to be  acting strange at a [employer] event. Patient states she felt so weak she thought she was dying.  Was in [facility]  then  after eating rice/beans and meat started c/o abdominal pain   Unclear if there was a syncopal event or not.  Was tachycardic when ems arrived and found to have hgb 8.6 and Creat 3.95  Baseline creat 2.5  Chart states that she was on meds for kidney failure but she self d/checo these  Had a code sepsis with temp 105  found to have Covid-19.  Hr was in the 70's now sinus keenan in the mid 40s  patient denies any dizziness   Echo EF 55%      22 y/o female with PMH of Hyperthyroidism and CKD who was found to be  acting strange at a [employer] event. Patient states she felt so weak she thought she was dying.  Was in usoh  then  after eating rice/beans and meat started c/o abdominal pain   Unclear if there was a syncopal event or not.  Was tachycardic when ems arrived and found to have hgb 8.6 and Creat 3.95  Baseline creat 2.5  Chart states that she was on meds for kidney failure but she self d/checo these  Had a code sepsis with temp 105  found to have Covid-19.  Hr was in the 70's now sinus keenan in the mid 40s  patient denies any dizziness   Echo EF 55%

## 2024-01-05 NOTE — PROGRESS NOTE ADULT - ASSESSMENT
23F Tanzanian speaking, w/ hx of thyroid issues and ckd brought in for AMS at a [employer] event. Patient was apparently in her normal state of health at a Judaism event and shortly after eating rice/beans and meat started c/o abdominal pain and cramping and then vomited. Friends who witnessed it said they called EMS due to patient being very agitated. No hx of drug use. She has a hx of "kidney problems" and used to be on medications for it but self discontinued " a while ago." In the field Patient given Versed and then Ativan in ER to allow for exam and labs. Labs with Hbg of 8.6 and Cr. of 3.95 with reported baseline of 2.5. No reported mental health history. Admitted for acute metabolic encephalopathy with MARIVEL on CKD. Found to have COVID  Course complicated by high grade fever, hypotension  Consult for adrenal insufficiency    1. Hypotension- BP soft but stable  - Likely multifactorial in the setting of MARIVEL on CKD/COVID infection/anemia  - AM cortisol 1/4 inaccurate as pt already on HC  - Patient will need to have formal evaluation off of steroids  - Since BP is stable, will taper off steroids for acth stim test  - Change hydrocortisone 50 to q12    2. Hypothyroidism  - Confirmed home dosing with pharmacy of LT4 25 mcg, last picked up 11/20 (last dose would have been 12/20)  - Will resume LT4 25 mcg  - Thyroid ultrasound pending  - FT4, TSI and TRAb and TPO/TG Ab all still pending    3. MARIVEL on CKD  - Renal following    4. COVID+   - Care per primary team 23F Lao speaking, w/ hx of thyroid issues and ckd brought in for AMS at a [employer] event. Patient was apparently in her normal state of health at a [employer] event and shortly after eating rice/beans and meat started c/o abdominal pain and cramping and then vomited. Friends who witnessed it said they called EMS due to patient being very agitated. No hx of drug use. She has a hx of "kidney problems" and used to be on medications for it but self discontinued " a while ago." In the field Patient given Versed and then Ativan in ER to allow for exam and labs. Labs with Hbg of 8.6 and Cr. of 3.95 with reported baseline of 2.5. No reported mental health history. Admitted for acute metabolic encephalopathy with MARIVEL on CKD. Found to have COVID  Course complicated by high grade fever, hypotension  Consult for adrenal insufficiency    1. Hypotension- BP soft but stable  - Likely multifactorial in the setting of MARIVEL on CKD/COVID infection/anemia  - AM cortisol 1/4 inaccurate as pt already on HC  - Patient will need to have formal evaluation off of steroids  - Since BP is stable, will taper off steroids for acth stim test  - Change hydrocortisone 50 to q12    2. Hypothyroidism  - Confirmed home dosing with pharmacy of LT4 25 mcg, last picked up 11/20 (last dose would have been 12/20)  - Will resume LT4 25 mcg  - Thyroid ultrasound pending  - FT4, TSI and TRAb and TPO/TG Ab all still pending    3. MARIVEL on CKD  - Renal following    4. COVID+   - Care per primary team 23F British speaking, w/ hx of thyroid issues and ckd brought in for AMS at a [employer] event. Patient was apparently in her normal state of health at a [employer] event and shortly after eating rice/beans and meat started c/o abdominal pain and cramping and then vomited. Friends who witnessed it said they called EMS due to patient being very agitated. No hx of drug use. She has a hx of "kidney problems" and used to be on medications for it but self discontinued " a while ago." In the field Patient given Versed and then Ativan in ER to allow for exam and labs. Labs with Hbg of 8.6 and Cr. of 3.95 with reported baseline of 2.5. No reported mental health history. Admitted for acute metabolic encephalopathy with MARIVEL on CKD. Found to have COVID  Course complicated by high grade fever, hypotension  Consult for adrenal insufficiency    1. Hypotension- BP soft but stable  - Likely multifactorial in the setting of MARIVEL on CKD/COVID infection/anemia  - AM cortisol 1/4 inaccurate as pt already on HC  - Patient will need to have formal evaluation off of steroids  - Since BP is stable, will taper off steroids for acth stim test  - Change hydrocortisone 50 to q12    2. Hypothyroidism  - Confirmed home dosing with [facility] ([phone]) LT4 25 mcg, last picked up 11/20 (last dose would have been 12/20)  - Will resume LT4 25 mcg  - Thyroid ultrasound pending  - FT4, TSI and TRAb and TPO/TG Ab all still pending    3. MARIVEL on CKD  - Renal following    4. COVID+   - Care per primary team 23F Citizen of [place] speaking, w/ hx of thyroid issues and ckd brought in for AMS at a [employer] event. Patient was apparently in her normal state of health at a [employer] event and shortly after eating rice/beans and meat started c/o abdominal pain and cramping and then vomited. Friends who witnessed it said they called EMS due to patient being very agitated. No hx of drug use. She has a hx of "kidney problems" and used to be on medications for it but self discontinued " a while ago." In the field Patient given Versed and then Ativan in ER to allow for exam and labs. Labs with Hbg of 8.6 and Cr. of 3.95 with reported baseline of 2.5. No reported mental health history. Admitted for acute metabolic encephalopathy with MARIVEL on CKD. Found to have COVID  Course complicated by high grade fever, hypotension  Consult for adrenal insufficiency    1. Hypotension- BP soft but stable  - Likely multifactorial in the setting of MARIVEL on CKD/COVID infection/anemia  - AM cortisol 1/4 inaccurate as pt already on HC  - Patient will need to have formal evaluation off of steroids  - Since BP is stable, will taper off steroids for acth stim test  - Change hydrocortisone 50 to q12    2. Hypothyroidism  - Confirmed home dosing with [facility] ([phone]) LT4 25 mcg, last picked up 11/20 (last dose would have been 12/20)  - Will resume LT4 25 mcg  - Thyroid ultrasound pending  - FT4, TSI and TRAb and TPO/TG Ab all still pending    3. MARIVEL on CKD  - Renal following    4. COVID+   - Care per primary team 23F Malian speaking, w/ hypothyroidism and ckd brought in for AMS at a [employer] event. Patient was apparently in her normal state of health at a [employer]ian[employer] event and shortly after eating rice/beans and meat started c/o abdominal pain and cramping and then vomited. Friends who witnessed it said they called EMS due to patient being very agitated. No hx of drug use. She has a hx of "kidney problems" and used to be on medications for it but self discontinued " a while ago." In the field Patient given Versed and then Ativan in ER to allow for exam and labs. Labs with Hbg of 8.6 and Cr. of 3.95 with reported baseline of 2.5. No reported mental health history. Admitted for acute metabolic encephalopathy with MARIVEL on CKD. Found to have COVID  Course complicated by high grade fever, hypotension  Consult for adrenal insufficiency    1. Hypotension- BP soft but stable  - Likely multifactorial in the setting of MARIVEL on CKD/COVID infection/anemia  - AM cortisol 1/4 inaccurate as pt already on HC  - Patient will need to have formal evaluation off of steroids  - Since BP is stable, will taper off steroids for acth stim test  - Change hydrocortisone 50 to q12    2. Hypothyroidism  - Confirmed home dosing with [facility] ([phone]) LT4 25 mcg, last picked up 11/20 (last dose would have been 12/20)  - Will resume LT4 25 mcg  - Thyroid ultrasound pending  - FT4, TSI and TRAb and TPO/TG Ab all still pending    3. MARIVEL on CKD  - Renal following    4. COVID+   - Care per primary team 23F Greek speaking, w/ hypothyroidism and ckd brought in for AMS at a [employer] event. Patient was apparently in her normal state of health at a [employer] event and shortly after eating rice/beans and meat started c/o abdominal pain and cramping and then vomited. Friends who witnessed it said they called EMS due to patient being very agitated. No hx of drug use. She has a hx of "kidney problems" and used to be on medications for it but self discontinued " a while ago." In the field Patient given Versed and then Ativan in ER to allow for exam and labs. Labs with Hbg of 8.6 and Cr. of 3.95 with reported baseline of 2.5. No reported mental health history. Admitted for acute metabolic encephalopathy with MARIVEL on CKD. Found to have COVID  Course complicated by high grade fever, hypotension  Consult for adrenal insufficiency    1. Hypotension- BP soft but stable  - Likely multifactorial in the setting of MARIVEL on CKD/COVID infection/anemia  - AM cortisol 1/4 inaccurate as pt already on HC  - Patient will need to have formal evaluation off of steroids  - Since BP is stable, will taper off steroids for acth stim test  - Change hydrocortisone 50 to q12    2. Hypothyroidism  - Confirmed home dosing with [facility] ([phone]) LT4 25 mcg, last picked up 11/20 (last dose would have been 12/20)  - Will resume LT4 25 mcg  - Thyroid ultrasound pending  - FT4, TSI and TRAb and TPO/TG Ab all still pending    3. MARIVEL on CKD  - Renal following    4. COVID+   - Care per primary team 23F Portuguese speaking, w/ hypothyroidism and ckd brought in for AMS at a [employer] event. Patient was apparently in her normal state of health at a [employer] event and shortly after eating rice/beans and meat started c/o abdominal pain and cramping and then vomited. Friends who witnessed it said they called EMS due to patient being very agitated. No hx of drug use. She has a hx of "kidney problems" and used to be on medications for it but self discontinued " a while ago." In the field Patient given Versed and then Ativan in ER to allow for exam and labs. Labs with Hbg of 8.6 and Cr. of 3.95 with reported baseline of 2.5. No reported mental health history. Admitted for acute metabolic encephalopathy with MARIVEL on CKD. Found to have COVID  Course complicated by high grade fever, hypotension  Consult for adrenal insufficiency    1. Hypotension- BP soft but stable  - Likely multifactorial in the setting of MARIVEL on CKD/COVID infection/anemia  - AM cortisol 1/4 inaccurate as pt already on HC  - Patient will need to have formal evaluation off of steroids  - Since BP is stable, will taper off steroids for acth stim test  - Change hydrocortisone 50 to q12    2. Hypothyroidism  - TSH 3.55, FT4 1.1  - Confirmed home dosing with [facility] ([phone]) LT4 25 mcg, last picked up 11/20 (last dose would have been 12/20)  - Will resume home dose of LT4 25 mcg  - Thyroid ultrasound pending  - TSI and TRAb and TPO/TG Ab all still pending    3. MARIVEL on CKD  - Renal following    4. COVID+   - Care per primary team 23F Nepalese speaking, w/ hypothyroidism and ckd brought in for AMS at a J[employer]ism event. Patient was apparently in her normal state of health at a [employer] event and shortly after eating rice/beans and meat started c/o abdominal pain and cramping and then vomited. Friends who witnessed it said they called EMS due to patient being very agitated. No hx of drug use. She has a hx of "kidney problems" and used to be on medications for it but self discontinued " a while ago." In the field Patient given Versed and then Ativan in ER to allow for exam and labs. Labs with Hbg of 8.6 and Cr. of 3.95 with reported baseline of 2.5. No reported mental health history. Admitted for acute metabolic encephalopathy with MARIVEL on CKD. Found to have COVID  Course complicated by high grade fever, hypotension  Consult for adrenal insufficiency    1. Hypotension- BP soft but stable  - Likely multifactorial in the setting of MARIVEL on CKD/COVID infection/anemia  - AM cortisol 1/4 inaccurate as pt already on HC  - Patient will need to have formal evaluation off of steroids  - Since BP is stable, will taper off steroids for acth stim test  - Change hydrocortisone 50 to q12    2. Hypothyroidism  - TSH 3.55, FT4 1.1  - Confirmed home dosing with [facility] ([phone]) LT4 25 mcg, last picked up 11/20 (last dose would have been 12/20)  - Will resume home dose of LT4 25 mcg  - Thyroid ultrasound pending  - TSI and TRAb and TPO/TG Ab all still pending    3. MARIVEL on CKD  - Renal following    4. COVID+   - Care per primary team

## 2024-01-05 NOTE — PROGRESS NOTE ADULT - SUBJECTIVE AND OBJECTIVE BOX
INTERVAL HPI/OVERNIGHT EVENTS:  follow up for thyroid management  seen earlier this am  translation service: # [id number]    ros: denies chest pain or sob. starting to feel a little better    MEDICATIONS  (STANDING):  acetaminophen   IVPB .. 1000 milliGRAM(s) IV Intermittent once  calcitriol   Capsule 0.25 MICROGram(s) Oral daily  calcium carbonate    500 mG (Tums) Chewable 1 Tablet(s) Chew two times a day  epoetin dev (PROCRIT) Injectable 11976 Unit(s) SubCutaneous every 7 days  folic acid 1 milliGRAM(s) Oral daily  hydrocortisone sodium succinate Injectable 50 milliGRAM(s) IV Push every 12 hours  iron sucrose IVPB 200 milliGRAM(s) IV Intermittent every 24 hours  midodrine. 10 milliGRAM(s) Oral once  midodrine. 5 milliGRAM(s) Oral three times a day  multivitamin 1 Tablet(s) Oral daily  pantoprazole    Tablet 40 milliGRAM(s) Oral before breakfast  piperacillin/tazobactam IVPB.. 3.375 Gram(s) IV Intermittent every 12 hours  sodium bicarbonate 1300 milliGRAM(s) Oral three times a day  sodium chloride 0.9% lock flush 3 milliLiter(s) IV Push every 8 hours    MEDICATIONS  (PRN):  acetaminophen     Tablet .. 650 milliGRAM(s) Oral every 6 hours PRN Temp greater or equal to 38C (100.4F), Mild Pain (1 - 3)  albuterol    90 MICROgram(s) HFA Inhaler 2 Puff(s) Inhalation every 6 hours PRN Shortness of Breath and/or Wheezing  aluminum hydroxide/magnesium hydroxide/simethicone Suspension 30 milliLiter(s) Oral every 4 hours PRN Dyspepsia  guaiFENesin Oral Liquid (Sugar-Free) 100 milliGRAM(s) Oral every 6 hours PRN Cough  LORazepam   Injectable 1 milliGRAM(s) IV Push every 8 hours PRN Agitation  melatonin 3 milliGRAM(s) Oral at bedtime PRN Insomnia  ondansetron Injectable 4 milliGRAM(s) IV Push every 8 hours PRN Nausea and/or Vomiting      Allergies  No Known Allergies      Vital Signs Last 24 Hrs  T(C): 36.6 (05 Jan 2024 11:19), Max: 36.8 (04 Jan 2024 20:08)  T(F): 97.9 (05 Jan 2024 11:19), Max: 98.2 (04 Jan 2024 20:08)  HR: 49 (05 Jan 2024 11:19) (48 - 84)  BP: 106/63 (05 Jan 2024 11:19) (95/58 - 109/73)  BP(mean): --  RR: 18 (05 Jan 2024 11:19) (18 - 18)  SpO2: 99% (05 Jan 2024 11:19) (95% - 99%)    Parameters below as of 05 Jan 2024 08:59  Patient On (Oxygen Delivery Method): room air        PHYSICAL EXAM:  Constitutional: NAD, well-developed  HEENT: EOMI, no exophalmos  Neck: trachea midline, no thyroid enlargement  Respiratory: CTAB, normal respirations  Cardiovascular: S1 and S2, RRR  Gastrointestinal: BS+, soft, ntnd  Extremities: No le edema  Neurological: AOx3, no focal deficits      LABS:                        7.9    12.25 )-----------( 308      ( 05 Jan 2024 06:16 )             25.0     01-05    144  |  108  |  31.6<H>  ----------------------------<  141<H>  4.1   |  21.0<L>  |  3.97<H>    Ca    8.5      05 Jan 2024 06:16  Phos  4.9     01-05  Mg     1.8     01-05    TPro  5.9<L>  /  Alb  3.1<L>  /  TBili  <0.2<L>  /  DBili  x   /  AST  16  /  ALT  13  /  AlkPhos  43  01-04    Urinalysis Basic - ( 05 Jan 2024 06:16 )    Color: x / Appearance: x / SG: x / pH: x  Gluc: 141 mg/dL / Ketone: x  / Bili: x / Urobili: x   Blood: x / Protein: x / Nitrite: x   Leuk Esterase: x / RBC: x / WBC x   Sq Epi: x / Non Sq Epi: x / Bacteria: x                Thyroid Stimulating Hormone, Serum: 3.55 uIU/mL (01-05-24 @ 06:16)  Thyroid Stimulating Hormone, Serum: 1.99 uIU/mL (01-03-24 @ 06:42)   INTERVAL HPI/OVERNIGHT EVENTS:  follow up for thyroid management  seen earlier this am  translation service: # [id number]    ros: denies chest pain or sob. starting to feel a little better    MEDICATIONS  (STANDING):  acetaminophen   IVPB .. 1000 milliGRAM(s) IV Intermittent once  calcitriol   Capsule 0.25 MICROGram(s) Oral daily  calcium carbonate    500 mG (Tums) Chewable 1 Tablet(s) Chew two times a day  epoetin dev (PROCRIT) Injectable 79671 Unit(s) SubCutaneous every 7 days  folic acid 1 milliGRAM(s) Oral daily  hydrocortisone sodium succinate Injectable 50 milliGRAM(s) IV Push every 12 hours  iron sucrose IVPB 200 milliGRAM(s) IV Intermittent every 24 hours  midodrine. 10 milliGRAM(s) Oral once  midodrine. 5 milliGRAM(s) Oral three times a day  multivitamin 1 Tablet(s) Oral daily  pantoprazole    Tablet 40 milliGRAM(s) Oral before breakfast  piperacillin/tazobactam IVPB.. 3.375 Gram(s) IV Intermittent every 12 hours  sodium bicarbonate 1300 milliGRAM(s) Oral three times a day  sodium chloride 0.9% lock flush 3 milliLiter(s) IV Push every 8 hours    MEDICATIONS  (PRN):  acetaminophen     Tablet .. 650 milliGRAM(s) Oral every 6 hours PRN Temp greater or equal to 38C (100.4F), Mild Pain (1 - 3)  albuterol    90 MICROgram(s) HFA Inhaler 2 Puff(s) Inhalation every 6 hours PRN Shortness of Breath and/or Wheezing  aluminum hydroxide/magnesium hydroxide/simethicone Suspension 30 milliLiter(s) Oral every 4 hours PRN Dyspepsia  guaiFENesin Oral Liquid (Sugar-Free) 100 milliGRAM(s) Oral every 6 hours PRN Cough  LORazepam   Injectable 1 milliGRAM(s) IV Push every 8 hours PRN Agitation  melatonin 3 milliGRAM(s) Oral at bedtime PRN Insomnia  ondansetron Injectable 4 milliGRAM(s) IV Push every 8 hours PRN Nausea and/or Vomiting      Allergies  No Known Allergies      Vital Signs Last 24 Hrs  T(C): 36.6 (05 Jan 2024 11:19), Max: 36.8 (04 Jan 2024 20:08)  T(F): 97.9 (05 Jan 2024 11:19), Max: 98.2 (04 Jan 2024 20:08)  HR: 49 (05 Jan 2024 11:19) (48 - 84)  BP: 106/63 (05 Jan 2024 11:19) (95/58 - 109/73)  BP(mean): --  RR: 18 (05 Jan 2024 11:19) (18 - 18)  SpO2: 99% (05 Jan 2024 11:19) (95% - 99%)    Parameters below as of 05 Jan 2024 08:59  Patient On (Oxygen Delivery Method): room air        PHYSICAL EXAM:  Constitutional: NAD, well-developed  HEENT: EOMI, no exophalmos  Neck: trachea midline, no thyroid enlargement  Respiratory: CTAB, normal respirations  Cardiovascular: S1 and S2, RRR  Gastrointestinal: BS+, soft, ntnd  Extremities: No le edema  Neurological: AOx3, no focal deficits      LABS:                        7.9    12.25 )-----------( 308      ( 05 Jan 2024 06:16 )             25.0     01-05    144  |  108  |  31.6<H>  ----------------------------<  141<H>  4.1   |  21.0<L>  |  3.97<H>    Ca    8.5      05 Jan 2024 06:16  Phos  4.9     01-05  Mg     1.8     01-05    TPro  5.9<L>  /  Alb  3.1<L>  /  TBili  <0.2<L>  /  DBili  x   /  AST  16  /  ALT  13  /  AlkPhos  43  01-04    Urinalysis Basic - ( 05 Jan 2024 06:16 )    Color: x / Appearance: x / SG: x / pH: x  Gluc: 141 mg/dL / Ketone: x  / Bili: x / Urobili: x   Blood: x / Protein: x / Nitrite: x   Leuk Esterase: x / RBC: x / WBC x   Sq Epi: x / Non Sq Epi: x / Bacteria: x                Thyroid Stimulating Hormone, Serum: 3.55 uIU/mL (01-05-24 @ 06:16)  Thyroid Stimulating Hormone, Serum: 1.99 uIU/mL (01-03-24 @ 06:42)

## 2024-01-06 LAB
ANION GAP SERPL CALC-SCNC: 12 MMOL/L — SIGNIFICANT CHANGE UP (ref 5–17)
ANION GAP SERPL CALC-SCNC: 12 MMOL/L — SIGNIFICANT CHANGE UP (ref 5–17)
BUN SERPL-MCNC: 34.5 MG/DL — HIGH (ref 8–20)
BUN SERPL-MCNC: 34.5 MG/DL — HIGH (ref 8–20)
CALCIUM SERPL-MCNC: 8.4 MG/DL — SIGNIFICANT CHANGE UP (ref 8.4–10.5)
CALCIUM SERPL-MCNC: 8.4 MG/DL — SIGNIFICANT CHANGE UP (ref 8.4–10.5)
CHLORIDE SERPL-SCNC: 105 MMOL/L — SIGNIFICANT CHANGE UP (ref 96–108)
CHLORIDE SERPL-SCNC: 105 MMOL/L — SIGNIFICANT CHANGE UP (ref 96–108)
CO2 SERPL-SCNC: 24 MMOL/L — SIGNIFICANT CHANGE UP (ref 22–29)
CO2 SERPL-SCNC: 24 MMOL/L — SIGNIFICANT CHANGE UP (ref 22–29)
CREAT SERPL-MCNC: 4.35 MG/DL — HIGH (ref 0.5–1.3)
CREAT SERPL-MCNC: 4.35 MG/DL — HIGH (ref 0.5–1.3)
EGFR: 14 ML/MIN/1.73M2 — LOW
EGFR: 14 ML/MIN/1.73M2 — LOW
GLUCOSE SERPL-MCNC: 111 MG/DL — HIGH (ref 70–99)
GLUCOSE SERPL-MCNC: 111 MG/DL — HIGH (ref 70–99)
HCT VFR BLD CALC: 26.8 % — LOW (ref 34.5–45)
HCT VFR BLD CALC: 26.8 % — LOW (ref 34.5–45)
HGB BLD-MCNC: 7.9 G/DL — LOW (ref 11.5–15.5)
HGB BLD-MCNC: 7.9 G/DL — LOW (ref 11.5–15.5)
MCHC RBC-ENTMCNC: 26.6 PG — LOW (ref 27–34)
MCHC RBC-ENTMCNC: 26.6 PG — LOW (ref 27–34)
MCHC RBC-ENTMCNC: 29.5 GM/DL — LOW (ref 32–36)
MCHC RBC-ENTMCNC: 29.5 GM/DL — LOW (ref 32–36)
MCV RBC AUTO: 90.2 FL — SIGNIFICANT CHANGE UP (ref 80–100)
MCV RBC AUTO: 90.2 FL — SIGNIFICANT CHANGE UP (ref 80–100)
NRBC # BLD: 1 /100 WBCS — HIGH (ref 0–0)
NRBC # BLD: 1 /100 WBCS — HIGH (ref 0–0)
PLATELET # BLD AUTO: 271 K/UL — SIGNIFICANT CHANGE UP (ref 150–400)
PLATELET # BLD AUTO: 271 K/UL — SIGNIFICANT CHANGE UP (ref 150–400)
POTASSIUM SERPL-MCNC: 4.7 MMOL/L — SIGNIFICANT CHANGE UP (ref 3.5–5.3)
POTASSIUM SERPL-MCNC: 4.7 MMOL/L — SIGNIFICANT CHANGE UP (ref 3.5–5.3)
POTASSIUM SERPL-SCNC: 4.7 MMOL/L — SIGNIFICANT CHANGE UP (ref 3.5–5.3)
POTASSIUM SERPL-SCNC: 4.7 MMOL/L — SIGNIFICANT CHANGE UP (ref 3.5–5.3)
RBC # BLD: 2.97 M/UL — LOW (ref 3.8–5.2)
RBC # BLD: 2.97 M/UL — LOW (ref 3.8–5.2)
RBC # FLD: 15.9 % — HIGH (ref 10.3–14.5)
RBC # FLD: 15.9 % — HIGH (ref 10.3–14.5)
SODIUM SERPL-SCNC: 141 MMOL/L — SIGNIFICANT CHANGE UP (ref 135–145)
SODIUM SERPL-SCNC: 141 MMOL/L — SIGNIFICANT CHANGE UP (ref 135–145)
WBC # BLD: 12.93 K/UL — HIGH (ref 3.8–10.5)
WBC # BLD: 12.93 K/UL — HIGH (ref 3.8–10.5)
WBC # FLD AUTO: 12.93 K/UL — HIGH (ref 3.8–10.5)
WBC # FLD AUTO: 12.93 K/UL — HIGH (ref 3.8–10.5)

## 2024-01-06 PROCEDURE — 99233 SBSQ HOSP IP/OBS HIGH 50: CPT

## 2024-01-06 PROCEDURE — 76536 US EXAM OF HEAD AND NECK: CPT | Mod: 26

## 2024-01-06 RX ADMIN — Medication 1 TABLET(S): at 13:25

## 2024-01-06 RX ADMIN — PANTOPRAZOLE SODIUM 40 MILLIGRAM(S): 20 TABLET, DELAYED RELEASE ORAL at 06:01

## 2024-01-06 RX ADMIN — SODIUM CHLORIDE 3 MILLILITER(S): 9 INJECTION INTRAMUSCULAR; INTRAVENOUS; SUBCUTANEOUS at 13:36

## 2024-01-06 RX ADMIN — Medication 1 MILLIGRAM(S): at 13:27

## 2024-01-06 RX ADMIN — PIPERACILLIN AND TAZOBACTAM 25 GRAM(S): 4; .5 INJECTION, POWDER, LYOPHILIZED, FOR SOLUTION INTRAVENOUS at 06:00

## 2024-01-06 RX ADMIN — CALCITRIOL 0.25 MICROGRAM(S): 0.5 CAPSULE ORAL at 13:29

## 2024-01-06 RX ADMIN — Medication 50 MILLIGRAM(S): at 06:04

## 2024-01-06 RX ADMIN — SODIUM CHLORIDE 3 MILLILITER(S): 9 INJECTION INTRAMUSCULAR; INTRAVENOUS; SUBCUTANEOUS at 06:04

## 2024-01-06 RX ADMIN — Medication 1300 MILLIGRAM(S): at 13:26

## 2024-01-06 RX ADMIN — Medication 1 TABLET(S): at 17:49

## 2024-01-06 RX ADMIN — IRON SUCROSE 110 MILLIGRAM(S): 20 INJECTION, SOLUTION INTRAVENOUS at 17:50

## 2024-01-06 RX ADMIN — Medication 1300 MILLIGRAM(S): at 22:06

## 2024-01-06 RX ADMIN — FLUDROCORTISONE ACETATE 0.1 MILLIGRAM(S): 0.1 TABLET ORAL at 06:01

## 2024-01-06 RX ADMIN — Medication 1300 MILLIGRAM(S): at 06:01

## 2024-01-06 RX ADMIN — Medication 1 TABLET(S): at 06:03

## 2024-01-06 RX ADMIN — SODIUM CHLORIDE 3 MILLILITER(S): 9 INJECTION INTRAMUSCULAR; INTRAVENOUS; SUBCUTANEOUS at 22:27

## 2024-01-06 RX ADMIN — Medication 25 MICROGRAM(S): at 06:01

## 2024-01-06 NOTE — PROGRESS NOTE ADULT - ASSESSMENT
22 y/o female with PMH of Hyperthyroidism and CKD who was found to be  acting strange at a Jewish event. Patient states she felt so weak she thought she was dying. Was in usoh then after eating rice/beans and meat started c/o abdominal pain. Unclear if there was a syncopal event or not. Was tachycardic when ems arrived and found to have hgb 8.6 and Creat 3.95. Baseline creat 2.5  Chart states that she was on meds for kidney failure but she self d/checo these  Had a code sepsis with temp 105  found to have Covid-19.  HR was in the 70's now sinus keenan in the mid 40s. Patient denies any dizziness. Echo EF 55%     24 y/o female with PMH of Hyperthyroidism and CKD who was found to be  acting strange at a [employer] event. Patient states she felt so weak she thought she was dying. Was in usoh then after eating rice/beans and meat started c/o abdominal pain. Unclear if there was a syncopal event or not. Was tachycardic when ems arrived and found to have hgb 8.6 and Creat 3.95. Baseline creat 2.5  Chart states that she was on meds for kidney failure but she self d/checo these  Had a code sepsis with temp 105  found to have Covid-19.  HR was in the 70's now sinus keenan in the mid 40s. Patient denies any dizziness. Echo EF 55%

## 2024-01-06 NOTE — PROGRESS NOTE ADULT - ASSESSMENT
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  CT scan: Atrophic kidneys with bilateral indeterminate renal masses  - cont avoid potential nephrotoxins  - cont NaHCO3  - monitor labs  - still awaiting MRI abdomen to better evaluate renal masses  - consider AVF creation and renal transplant evaluation once stable    Hypotension: r/o adrenal insufficiency  - Hydrocortisone, midodrine  - Endocrinology f/u noted    RO: iPTH 73  - cont CaCO3 and Rocaltrol  -  Vitamin D pending  - trend labs    Anemia: +CKD + low Fe  - cont MARY and IV Fe  - target Hgb > 10.0  - attempt to avoid PRBCs if possible due to future renal transplant consideration CKD(III/IV): reported baseline of 2.5  Progressive renal decline  +COVID  M.A.  CT scan: Atrophic kidneys with bilateral indeterminate renal masses  - cont avoid potential nephrotoxins  - cont NaHCO3  - monitor labs  - still awaiting MRI abdomen to better evaluate renal masses  - consider AVF creation and renal transplant evaluation once stable--> if current trend continues may need to start RRT prior to d/c    Hypotension: r/o adrenal insufficiency  - Hydrocortisone, midodrine  - Endocrinology f/u noted    RO: iPTH 73  - cont CaCO3 and Rocaltrol  -  Vitamin D pending  - trend labs    Anemia: +CKD + low Fe  - cont MARY and IV Fe  - target Hgb > 10.0  - attempt to avoid PRBCs if possible due to future renal transplant consideration

## 2024-01-06 NOTE — PROGRESS NOTE ADULT - SUBJECTIVE AND OBJECTIVE BOX
NEPHROLOGY INTERVAL HPI/OVERNIGHT EVENTS:  stable overnight  no acute issues documented    MEDICATIONS  (STANDING):  acetaminophen   IVPB .. 1000 milliGRAM(s) IV Intermittent once  calcitriol   Capsule 0.25 MICROGram(s) Oral daily  calcium carbonate    500 mG (Tums) Chewable 1 Tablet(s) Chew two times a day  epoetin dev (PROCRIT) Injectable 89789 Unit(s) SubCutaneous every 7 days  fludroCORTISONE 0.1 milliGRAM(s) Oral daily  folic acid 1 milliGRAM(s) Oral daily  iron sucrose IVPB 200 milliGRAM(s) IV Intermittent every 24 hours  levothyroxine 25 MICROGram(s) Oral daily  midodrine. 10 milliGRAM(s) Oral once  multivitamin 1 Tablet(s) Oral daily  pantoprazole    Tablet 40 milliGRAM(s) Oral before breakfast  piperacillin/tazobactam IVPB.. 3.375 Gram(s) IV Intermittent every 12 hours  sodium bicarbonate 1300 milliGRAM(s) Oral three times a day  sodium chloride 0.9% lock flush 3 milliLiter(s) IV Push every 8 hours    MEDICATIONS  (PRN):  acetaminophen     Tablet .. 650 milliGRAM(s) Oral every 6 hours PRN Temp greater or equal to 38C (100.4F), Mild Pain (1 - 3)  albuterol    90 MICROgram(s) HFA Inhaler 2 Puff(s) Inhalation every 6 hours PRN Shortness of Breath and/or Wheezing  aluminum hydroxide/magnesium hydroxide/simethicone Suspension 30 milliLiter(s) Oral every 4 hours PRN Dyspepsia  guaiFENesin Oral Liquid (Sugar-Free) 100 milliGRAM(s) Oral every 6 hours PRN Cough  LORazepam   Injectable 1 milliGRAM(s) IV Push every 8 hours PRN Agitation  melatonin 3 milliGRAM(s) Oral at bedtime PRN Insomnia  ondansetron Injectable 4 milliGRAM(s) IV Push every 8 hours PRN Nausea and/or Vomiting      Allergies    No Known Allergies    Intolerances              Vital Signs Last 24 Hrs  T(C): 36.4 (06 Jan 2024 04:48), Max: 36.8 (05 Jan 2024 08:59)  T(F): 97.6 (06 Jan 2024 04:48), Max: 98.2 (05 Jan 2024 08:59)  HR: 49 (06 Jan 2024 04:48) (46 - 69)  BP: 111/75 (06 Jan 2024 04:48) (102/61 - 118/74)  BP(mean): --  RR: 18 (06 Jan 2024 04:48) (18 - 18)  SpO2: 95% (06 Jan 2024 04:48) (94% - 99%)    Parameters below as of 06 Jan 2024 04:48  Patient On (Oxygen Delivery Method): room air        PHYSICAL EXAM:  GENERAL: NAD  HEENT: Moist mucous membranes  NECK: Supple; no JVD  NERVOUS SYSTEM: Awake, alert  EXTREMITIES: no dependent edema  Further exam deferred to limit COVID exposure    LABS:                        7.9    12.25 )-----------( 308      ( 05 Jan 2024 06:16 )             25.0     01-05    144  |  108  |  31.6<H>  ----------------------------<  141<H>  4.1   |  21.0<L>  |  3.97<H>    Ca    8.5      05 Jan 2024 06:16  Phos  4.9     01-05  Mg     1.8     01-05    TPro  5.9<L>  /  Alb  3.1<L>  /  TBili  <0.2<L>  /  DBili  x   /  AST  16  /  ALT  13  /  AlkPhos  43  01-04      Urinalysis Basic - ( 05 Jan 2024 06:16 )    Color: x / Appearance: x / SG: x / pH: x  Gluc: 141 mg/dL / Ketone: x  / Bili: x / Urobili: x   Blood: x / Protein: x / Nitrite: x   Leuk Esterase: x / RBC: x / WBC x   Sq Epi: x / Non Sq Epi: x / Bacteria: x      Phosphorus: 4.9 mg/dL (01-05 @ 06:16)  Magnesium: 1.8 mg/dL (01-05 @ 06:16)      RADIOLOGY & ADDITIONAL TESTS:  < from: US Kidney and Bladder (01.01.24 @ 08:46) >  ACC: [id number] EXAM:  US KIDNEYS AND BLADDER   ORDERED BY: LORRAINE KIM     PROCEDURE DATE:  01/01/2024          INTERPRETATION:  CLINICAL INFORMATION: Worsening renal failure.    COMPARISON: None available.    TECHNIQUE: Sonography of thekidneys and bladder.    FINDINGS:  Right kidney: 6.0 cm. Atrophic. No hydronephrosis or calculi. Upper pole   cyst measuring 0.8 x 0.8 x 0.8 cm. Midpole isoechoic mass versus   prominent cortical lobulation measuring 2.6 x 2.6 x 2.6 cm.    Left kidney: 7.2 cm. Atrophic. No hydronephrosis or calculi. Upper pole   cyst measuring 0.9 x 0.8 x 0.7 cm.    Urinary bladder: Bilateral urinary jets visualized.    IMPRESSION:  Atrophic kidneys. No hydronephrosis.    Right renal mass versus prominent cortical lobulation. Recommend renal   protocol CT or MRI for further evaluation when clinically feasible.    < end of copied text >   NEPHROLOGY INTERVAL HPI/OVERNIGHT EVENTS:  stable overnight  no acute issues documented    MEDICATIONS  (STANDING):  acetaminophen   IVPB .. 1000 milliGRAM(s) IV Intermittent once  calcitriol   Capsule 0.25 MICROGram(s) Oral daily  calcium carbonate    500 mG (Tums) Chewable 1 Tablet(s) Chew two times a day  epoetin dev (PROCRIT) Injectable 01946 Unit(s) SubCutaneous every 7 days  fludroCORTISONE 0.1 milliGRAM(s) Oral daily  folic acid 1 milliGRAM(s) Oral daily  iron sucrose IVPB 200 milliGRAM(s) IV Intermittent every 24 hours  levothyroxine 25 MICROGram(s) Oral daily  midodrine. 10 milliGRAM(s) Oral once  multivitamin 1 Tablet(s) Oral daily  pantoprazole    Tablet 40 milliGRAM(s) Oral before breakfast  piperacillin/tazobactam IVPB.. 3.375 Gram(s) IV Intermittent every 12 hours  sodium bicarbonate 1300 milliGRAM(s) Oral three times a day  sodium chloride 0.9% lock flush 3 milliLiter(s) IV Push every 8 hours    MEDICATIONS  (PRN):  acetaminophen     Tablet .. 650 milliGRAM(s) Oral every 6 hours PRN Temp greater or equal to 38C (100.4F), Mild Pain (1 - 3)  albuterol    90 MICROgram(s) HFA Inhaler 2 Puff(s) Inhalation every 6 hours PRN Shortness of Breath and/or Wheezing  aluminum hydroxide/magnesium hydroxide/simethicone Suspension 30 milliLiter(s) Oral every 4 hours PRN Dyspepsia  guaiFENesin Oral Liquid (Sugar-Free) 100 milliGRAM(s) Oral every 6 hours PRN Cough  LORazepam   Injectable 1 milliGRAM(s) IV Push every 8 hours PRN Agitation  melatonin 3 milliGRAM(s) Oral at bedtime PRN Insomnia  ondansetron Injectable 4 milliGRAM(s) IV Push every 8 hours PRN Nausea and/or Vomiting      Allergies    No Known Allergies    Intolerances              Vital Signs Last 24 Hrs  T(C): 36.4 (06 Jan 2024 04:48), Max: 36.8 (05 Jan 2024 08:59)  T(F): 97.6 (06 Jan 2024 04:48), Max: 98.2 (05 Jan 2024 08:59)  HR: 49 (06 Jan 2024 04:48) (46 - 69)  BP: 111/75 (06 Jan 2024 04:48) (102/61 - 118/74)  BP(mean): --  RR: 18 (06 Jan 2024 04:48) (18 - 18)  SpO2: 95% (06 Jan 2024 04:48) (94% - 99%)    Parameters below as of 06 Jan 2024 04:48  Patient On (Oxygen Delivery Method): room air        PHYSICAL EXAM:  GENERAL: NAD  HEENT: Moist mucous membranes  NECK: Supple; no JVD  NERVOUS SYSTEM: Awake, alert  EXTREMITIES: no dependent edema  Further exam deferred to limit COVID exposure    LABS:                        7.9    12.25 )-----------( 308      ( 05 Jan 2024 06:16 )             25.0     01-05    144  |  108  |  31.6<H>  ----------------------------<  141<H>  4.1   |  21.0<L>  |  3.97<H>    Ca    8.5      05 Jan 2024 06:16  Phos  4.9     01-05  Mg     1.8     01-05    TPro  5.9<L>  /  Alb  3.1<L>  /  TBili  <0.2<L>  /  DBili  x   /  AST  16  /  ALT  13  /  AlkPhos  43  01-04      Urinalysis Basic - ( 05 Jan 2024 06:16 )    Color: x / Appearance: x / SG: x / pH: x  Gluc: 141 mg/dL / Ketone: x  / Bili: x / Urobili: x   Blood: x / Protein: x / Nitrite: x   Leuk Esterase: x / RBC: x / WBC x   Sq Epi: x / Non Sq Epi: x / Bacteria: x      Phosphorus: 4.9 mg/dL (01-05 @ 06:16)  Magnesium: 1.8 mg/dL (01-05 @ 06:16)      RADIOLOGY & ADDITIONAL TESTS:  < from: US Kidney and Bladder (01.01.24 @ 08:46) >  ACC: [id number] EXAM:  US KIDNEYS AND BLADDER   ORDERED BY: LORRAINE KIM     PROCEDURE DATE:  01/01/2024          INTERPRETATION:  CLINICAL INFORMATION: Worsening renal failure.    COMPARISON: None available.    TECHNIQUE: Sonography of thekidneys and bladder.    FINDINGS:  Right kidney: 6.0 cm. Atrophic. No hydronephrosis or calculi. Upper pole   cyst measuring 0.8 x 0.8 x 0.8 cm. Midpole isoechoic mass versus   prominent cortical lobulation measuring 2.6 x 2.6 x 2.6 cm.    Left kidney: 7.2 cm. Atrophic. No hydronephrosis or calculi. Upper pole   cyst measuring 0.9 x 0.8 x 0.7 cm.    Urinary bladder: Bilateral urinary jets visualized.    IMPRESSION:  Atrophic kidneys. No hydronephrosis.    Right renal mass versus prominent cortical lobulation. Recommend renal   protocol CT or MRI for further evaluation when clinically feasible.    < end of copied text >   NEPHROLOGY INTERVAL HPI/OVERNIGHT EVENTS:  stable overnight  no acute issues documented    MEDICATIONS  (STANDING):  acetaminophen   IVPB .. 1000 milliGRAM(s) IV Intermittent once  calcitriol   Capsule 0.25 MICROGram(s) Oral daily  calcium carbonate    500 mG (Tums) Chewable 1 Tablet(s) Chew two times a day  epoetin dev (PROCRIT) Injectable 34366 Unit(s) SubCutaneous every 7 days  fludroCORTISONE 0.1 milliGRAM(s) Oral daily  folic acid 1 milliGRAM(s) Oral daily  iron sucrose IVPB 200 milliGRAM(s) IV Intermittent every 24 hours  levothyroxine 25 MICROGram(s) Oral daily  midodrine. 10 milliGRAM(s) Oral once  multivitamin 1 Tablet(s) Oral daily  pantoprazole    Tablet 40 milliGRAM(s) Oral before breakfast  piperacillin/tazobactam IVPB.. 3.375 Gram(s) IV Intermittent every 12 hours  sodium bicarbonate 1300 milliGRAM(s) Oral three times a day  sodium chloride 0.9% lock flush 3 milliLiter(s) IV Push every 8 hours    MEDICATIONS  (PRN):  acetaminophen     Tablet .. 650 milliGRAM(s) Oral every 6 hours PRN Temp greater or equal to 38C (100.4F), Mild Pain (1 - 3)  albuterol    90 MICROgram(s) HFA Inhaler 2 Puff(s) Inhalation every 6 hours PRN Shortness of Breath and/or Wheezing  aluminum hydroxide/magnesium hydroxide/simethicone Suspension 30 milliLiter(s) Oral every 4 hours PRN Dyspepsia  guaiFENesin Oral Liquid (Sugar-Free) 100 milliGRAM(s) Oral every 6 hours PRN Cough  LORazepam   Injectable 1 milliGRAM(s) IV Push every 8 hours PRN Agitation  melatonin 3 milliGRAM(s) Oral at bedtime PRN Insomnia  ondansetron Injectable 4 milliGRAM(s) IV Push every 8 hours PRN Nausea and/or Vomiting      Allergies    No Known Allergies    Intolerances              Vital Signs Last 24 Hrs  T(C): 36.4 (06 Jan 2024 04:48), Max: 36.8 (05 Jan 2024 08:59)  T(F): 97.6 (06 Jan 2024 04:48), Max: 98.2 (05 Jan 2024 08:59)  HR: 49 (06 Jan 2024 04:48) (46 - 69)  BP: 111/75 (06 Jan 2024 04:48) (102/61 - 118/74)  BP(mean): --  RR: 18 (06 Jan 2024 04:48) (18 - 18)  SpO2: 95% (06 Jan 2024 04:48) (94% - 99%)    Parameters below as of 06 Jan 2024 04:48  Patient On (Oxygen Delivery Method): room air        PHYSICAL EXAM:  GENERAL: NAD  HEENT: Moist mucous membranes  NECK: Supple; no JVD  NERVOUS SYSTEM: Awake, alert  EXTREMITIES: no dependent edema  Further exam deferred to limit COVID exposure    LABS:                        7.9    12.25 )-----------( 308      ( 05 Jan 2024 06:16 )             25.0     01-05    144  |  108  |  31.6<H>  ----------------------------<  141<H>  4.1   |  21.0<L>  |  3.97<H>      Basic Metabolic Panel in AM (01.06.24 @ 06:04)   Sodium: 141 mmol/L  Potassium: 4.7 mmol/L  Chloride: 105: Chloride reference range changed from ..10/26/2022 mmol/L  Carbon Dioxide: 24.0 mmol/L  Anion Gap: 12 mmol/L  Blood Urea Nitrogen: 34.5 mg/dL  Creatinine: 4.35 mg/dL  Glucose: 111 mg/dL  Calcium: 8.4 mg/dL  Ca    8.5      05 Jan 2024 06:16  Phos  4.9     01-05  Mg     1.8     01-05    TPro  5.9<L>  /  Alb  3.1<L>  /  TBili  <0.2<L>  /  DBili  x   /  AST  16  /  ALT  13  /  AlkPhos  43  01-04      Urinalysis Basic - ( 05 Jan 2024 06:16 )    Color: x / Appearance: x / SG: x / pH: x  Gluc: 141 mg/dL / Ketone: x  / Bili: x / Urobili: x   Blood: x / Protein: x / Nitrite: x   Leuk Esterase: x / RBC: x / WBC x   Sq Epi: x / Non Sq Epi: x / Bacteria: x      Phosphorus: 4.9 mg/dL (01-05 @ 06:16)  Magnesium: 1.8 mg/dL (01-05 @ 06:16)      RADIOLOGY & ADDITIONAL TESTS:  < from: US Kidney and Bladder (01.01.24 @ 08:46) >  ACC: [id number] EXAM:  US KIDNEYS AND BLADDER   ORDERED BY: LORRAINE KIM     PROCEDURE DATE:  01/01/2024          INTERPRETATION:  CLINICAL INFORMATION: Worsening renal failure.    COMPARISON: None available.    TECHNIQUE: Sonography of thekidneys and bladder.    FINDINGS:  Right kidney: 6.0 cm. Atrophic. No hydronephrosis or calculi. Upper pole   cyst measuring 0.8 x 0.8 x 0.8 cm. Midpole isoechoic mass versus   prominent cortical lobulation measuring 2.6 x 2.6 x 2.6 cm.    Left kidney: 7.2 cm. Atrophic. No hydronephrosis or calculi. Upper pole   cyst measuring 0.9 x 0.8 x 0.7 cm.    Urinary bladder: Bilateral urinary jets visualized.    IMPRESSION:  Atrophic kidneys. No hydronephrosis.    Right renal mass versus prominent cortical lobulation. Recommend renal   protocol CT or MRI for further evaluation when clinically feasible.    < end of copied text >   NEPHROLOGY INTERVAL HPI/OVERNIGHT EVENTS:  stable overnight  no acute issues documented    MEDICATIONS  (STANDING):  acetaminophen   IVPB .. 1000 milliGRAM(s) IV Intermittent once  calcitriol   Capsule 0.25 MICROGram(s) Oral daily  calcium carbonate    500 mG (Tums) Chewable 1 Tablet(s) Chew two times a day  epoetin dev (PROCRIT) Injectable 82535 Unit(s) SubCutaneous every 7 days  fludroCORTISONE 0.1 milliGRAM(s) Oral daily  folic acid 1 milliGRAM(s) Oral daily  iron sucrose IVPB 200 milliGRAM(s) IV Intermittent every 24 hours  levothyroxine 25 MICROGram(s) Oral daily  midodrine. 10 milliGRAM(s) Oral once  multivitamin 1 Tablet(s) Oral daily  pantoprazole    Tablet 40 milliGRAM(s) Oral before breakfast  piperacillin/tazobactam IVPB.. 3.375 Gram(s) IV Intermittent every 12 hours  sodium bicarbonate 1300 milliGRAM(s) Oral three times a day  sodium chloride 0.9% lock flush 3 milliLiter(s) IV Push every 8 hours    MEDICATIONS  (PRN):  acetaminophen     Tablet .. 650 milliGRAM(s) Oral every 6 hours PRN Temp greater or equal to 38C (100.4F), Mild Pain (1 - 3)  albuterol    90 MICROgram(s) HFA Inhaler 2 Puff(s) Inhalation every 6 hours PRN Shortness of Breath and/or Wheezing  aluminum hydroxide/magnesium hydroxide/simethicone Suspension 30 milliLiter(s) Oral every 4 hours PRN Dyspepsia  guaiFENesin Oral Liquid (Sugar-Free) 100 milliGRAM(s) Oral every 6 hours PRN Cough  LORazepam   Injectable 1 milliGRAM(s) IV Push every 8 hours PRN Agitation  melatonin 3 milliGRAM(s) Oral at bedtime PRN Insomnia  ondansetron Injectable 4 milliGRAM(s) IV Push every 8 hours PRN Nausea and/or Vomiting      Allergies    No Known Allergies    Intolerances              Vital Signs Last 24 Hrs  T(C): 36.4 (06 Jan 2024 04:48), Max: 36.8 (05 Jan 2024 08:59)  T(F): 97.6 (06 Jan 2024 04:48), Max: 98.2 (05 Jan 2024 08:59)  HR: 49 (06 Jan 2024 04:48) (46 - 69)  BP: 111/75 (06 Jan 2024 04:48) (102/61 - 118/74)  BP(mean): --  RR: 18 (06 Jan 2024 04:48) (18 - 18)  SpO2: 95% (06 Jan 2024 04:48) (94% - 99%)    Parameters below as of 06 Jan 2024 04:48  Patient On (Oxygen Delivery Method): room air        PHYSICAL EXAM:  GENERAL: NAD  HEENT: Moist mucous membranes  NECK: Supple; no JVD  NERVOUS SYSTEM: Awake, alert  EXTREMITIES: no dependent edema  Further exam deferred to limit COVID exposure    LABS:                        7.9    12.25 )-----------( 308      ( 05 Jan 2024 06:16 )             25.0     01-05    144  |  108  |  31.6<H>  ----------------------------<  141<H>  4.1   |  21.0<L>  |  3.97<H>      Basic Metabolic Panel in AM (01.06.24 @ 06:04)   Sodium: 141 mmol/L  Potassium: 4.7 mmol/L  Chloride: 105: Chloride reference range changed from ..10/26/2022 mmol/L  Carbon Dioxide: 24.0 mmol/L  Anion Gap: 12 mmol/L  Blood Urea Nitrogen: 34.5 mg/dL  Creatinine: 4.35 mg/dL  Glucose: 111 mg/dL  Calcium: 8.4 mg/dL  Ca    8.5      05 Jan 2024 06:16  Phos  4.9     01-05  Mg     1.8     01-05    TPro  5.9<L>  /  Alb  3.1<L>  /  TBili  <0.2<L>  /  DBili  x   /  AST  16  /  ALT  13  /  AlkPhos  43  01-04      Urinalysis Basic - ( 05 Jan 2024 06:16 )    Color: x / Appearance: x / SG: x / pH: x  Gluc: 141 mg/dL / Ketone: x  / Bili: x / Urobili: x   Blood: x / Protein: x / Nitrite: x   Leuk Esterase: x / RBC: x / WBC x   Sq Epi: x / Non Sq Epi: x / Bacteria: x      Phosphorus: 4.9 mg/dL (01-05 @ 06:16)  Magnesium: 1.8 mg/dL (01-05 @ 06:16)      RADIOLOGY & ADDITIONAL TESTS:  < from: US Kidney and Bladder (01.01.24 @ 08:46) >  ACC: [id number] EXAM:  US KIDNEYS AND BLADDER   ORDERED BY: LORRAINE KIM     PROCEDURE DATE:  01/01/2024          INTERPRETATION:  CLINICAL INFORMATION: Worsening renal failure.    COMPARISON: None available.    TECHNIQUE: Sonography of thekidneys and bladder.    FINDINGS:  Right kidney: 6.0 cm. Atrophic. No hydronephrosis or calculi. Upper pole   cyst measuring 0.8 x 0.8 x 0.8 cm. Midpole isoechoic mass versus   prominent cortical lobulation measuring 2.6 x 2.6 x 2.6 cm.    Left kidney: 7.2 cm. Atrophic. No hydronephrosis or calculi. Upper pole   cyst measuring 0.9 x 0.8 x 0.7 cm.    Urinary bladder: Bilateral urinary jets visualized.    IMPRESSION:  Atrophic kidneys. No hydronephrosis.    Right renal mass versus prominent cortical lobulation. Recommend renal   protocol CT or MRI for further evaluation when clinically feasible.    < end of copied text >

## 2024-01-06 NOTE — PROGRESS NOTE ADULT - SUBJECTIVE AND OBJECTIVE BOX
[facility]                                                         CARDIOLOGY AT [facility]                                                                  [address], [place] [address]                                                         Telephone: [phone]. Fax:[phone]                                                                             PROGRESS NOTE    Reason for follow up: Bradycardia   Update: Pt is still bradycardic with HR down to the mid 40s. HR goes as high as 56. Patient reports a headache overnight but otherwise no other events.     Used : Seema ID # [id number]       Review of symptoms:   Cardiac:  No chest pain. No dyspnea. No palpitations.  Respiratory: no cough. No dyspnea  Gastrointestinal: No diarrhea. No abdominal pain. No bleeding.   Neuro: No focal neuro complaints.    Vitals:  T(C): 36.8 (01-06-24 @ 10:58), Max: 36.8 (01-06-24 @ 10:58)  HR: 51 (01-06-24 @ 10:58) (46 - 69)  BP: 110/70 (01-06-24 @ 10:58) (102/61 - 118/74)  RR: 18 (01-06-24 @ 10:58) (18 - 18)  SpO2: 95% (01-06-24 @ 10:58) (94% - 99%)  Wt(kg): --  I&O's Summary    05 Jan 2024 07:01  -  06 Jan 2024 07:00  --------------------------------------------------------  IN: 210 mL / OUT: 0 mL / NET: 210 mL      Weight (kg): 57.198 (01-02 @ 20:41)    PHYSICAL EXAM:  Appearance: Comfortable. No acute distress  HEENT:  Atraumatic. Normocephalic.  Normal oral mucosa  Neurologic: A & O x 3, no gross focal deficits.  Cardiovascular: RRR S1 S2, bradycardic. No murmur, no rubs/gallops. No JVD  Respiratory: Lungs clear to auscultation, unlabored   Gastrointestinal:  Soft, Non-tender, + BS  Lower Extremities: 2+ Peripheral Pulses, No clubbing, cyanosis, or edema  Psychiatry: Patient is calm. No agitation.   Skin: warm and dry.    CURRENT CARDIAC MEDICATIONS:  midodrine. 10 milliGRAM(s) Oral once      CURRENT OTHER MEDICATIONS:  albuterol    90 MICROgram(s) HFA Inhaler 2 Puff(s) Inhalation every 6 hours PRN Shortness of Breath and/or Wheezing  guaiFENesin Oral Liquid (Sugar-Free) 100 milliGRAM(s) Oral every 6 hours PRN Cough  piperacillin/tazobactam IVPB.. 3.375 Gram(s) IV Intermittent every 12 hours  acetaminophen     Tablet .. 650 milliGRAM(s) Oral every 6 hours PRN Temp greater or equal to 38C (100.4F), Mild Pain (1 - 3)  acetaminophen   IVPB .. 1000 milliGRAM(s) IV Intermittent once  LORazepam   Injectable 1 milliGRAM(s) IV Push every 8 hours PRN Agitation  melatonin 3 milliGRAM(s) Oral at bedtime PRN Insomnia  ondansetron Injectable 4 milliGRAM(s) IV Push every 8 hours PRN Nausea and/or Vomiting  aluminum hydroxide/magnesium hydroxide/simethicone Suspension 30 milliLiter(s) Oral every 4 hours PRN Dyspepsia  calcium carbonate    500 mG (Tums) Chewable 1 Tablet(s) Chew two times a day  pantoprazole    Tablet 40 milliGRAM(s) Oral before breakfast  fludroCORTISONE 0.1 milliGRAM(s) Oral daily  levothyroxine 25 MICROGram(s) Oral daily  calcitriol   Capsule 0.25 MICROGram(s) Oral daily  epoetin dev (PROCRIT) Injectable 11396 Unit(s) SubCutaneous every 7 days  folic acid 1 milliGRAM(s) Oral daily  iron sucrose IVPB 200 milliGRAM(s) IV Intermittent every 24 hours, Stop order after: 5 Doses  multivitamin 1 Tablet(s) Oral daily  sodium bicarbonate 1300 milliGRAM(s) Oral three times a day  sodium chloride 0.9% lock flush 3 milliLiter(s) IV Push every 8 hours      LABS:	 	  ( 01 Jan 2024 11:31 )  Troponin T  X    ,  CPK  106  , CKMB  X    , BNP X        , ( 01 Jan 2024 03:50 )  Troponin T  X    ,  CPK  141  , CKMB  X    , BNP X                                  7.9    12.93 )-----------( 271      ( 06 Jan 2024 06:04 )             26.8     01-06    141  |  105  |  34.5<H>  ----------------------------<  111<H>  4.7   |  24.0  |  4.35<H>    Ca    8.4      06 Jan 2024 06:04  Phos  4.9     01-05  Mg     1.8     01-05      PT/INR/PTT ( 02 Jan 2024 23:51 )                       :                       :      14.0         :       28.9                  .        .                   .              .           .       1.27        .                                       Lipid Profile:   HgA1c:   TSH: Thyroid Stimulating Hormone, Serum: 3.55 uIU/mL  Thyroid Stimulating Hormone, Serum: 1.99 uIU/mL      TELEMETRY: Sinus bradycardic 40s with HR as high as 56   ECG: NSR     DIAGNOSTIC TESTING:  [x ] Echocardiogram:  < from: TTE W or WO Ultrasound Enhancing Agent (01.03.24 @ 12:49) >   1. Left ventricular cavity is normal. Left ventricular wall thickness is normal. The interventricular septum is flattened in systole consistent with right ventricular pressure overload. Left ventricular systolic function is normal with an ejection fraction visually estimated at 55 to 60 %.   2. Normal left ventricular diastolic function.   3. Mildly enlarged right ventricular cavity size and systolic function.   4. The left atrium is normal.   5. The right atrium is mildly dilated in size.   6. Thickened mitral valve leaflets. Mild mitral valve leaflet calcification.   7. Mild mitral regurgitation.   8. Trileaflet aortic valve with normal systolic excursion.   9. Moderate tricuspid regurgitation.  10. Moderate pulmonic regurgitation.  11. No pericardial effusion seen.  12. Secundum atrial septal defect with bidrectional shunting.  13. Estimated pulmonary artery systolic pressure is 31 mmHg, consistent with normal pulmonary artery pressure.    < end of copied text >    [ ]  Catheterization:  [ ] Stress Test:    OTHER: 	                                                                [facility]                                                         CARDIOLOGY AT [facility]                                                                  [address], [place] [address]                                                         Telephone: [phone]. Fax:[phone]                                                                             PROGRESS NOTE    Reason for follow up: Bradycardia   Update: Pt is still bradycardic with HR down to the mid 40s. HR goes as high as 56. Patient reports a headache overnight but otherwise no other events.     Used : Seema ID # [id number]       Review of symptoms:   Cardiac:  No chest pain. No dyspnea. No palpitations.  Respiratory: no cough. No dyspnea  Gastrointestinal: No diarrhea. No abdominal pain. No bleeding.   Neuro: No focal neuro complaints.    Vitals:  T(C): 36.8 (01-06-24 @ 10:58), Max: 36.8 (01-06-24 @ 10:58)  HR: 51 (01-06-24 @ 10:58) (46 - 69)  BP: 110/70 (01-06-24 @ 10:58) (102/61 - 118/74)  RR: 18 (01-06-24 @ 10:58) (18 - 18)  SpO2: 95% (01-06-24 @ 10:58) (94% - 99%)  Wt(kg): --  I&O's Summary    05 Jan 2024 07:01  -  06 Jan 2024 07:00  --------------------------------------------------------  IN: 210 mL / OUT: 0 mL / NET: 210 mL      Weight (kg): 57.198 (01-02 @ 20:41)    PHYSICAL EXAM:  Appearance: Comfortable. No acute distress  HEENT:  Atraumatic. Normocephalic.  Normal oral mucosa  Neurologic: A & O x 3, no gross focal deficits.  Cardiovascular: RRR S1 S2, bradycardic. No murmur, no rubs/gallops. No JVD  Respiratory: Lungs clear to auscultation, unlabored   Gastrointestinal:  Soft, Non-tender, + BS  Lower Extremities: 2+ Peripheral Pulses, No clubbing, cyanosis, or edema  Psychiatry: Patient is calm. No agitation.   Skin: warm and dry.    CURRENT CARDIAC MEDICATIONS:  midodrine. 10 milliGRAM(s) Oral once      CURRENT OTHER MEDICATIONS:  albuterol    90 MICROgram(s) HFA Inhaler 2 Puff(s) Inhalation every 6 hours PRN Shortness of Breath and/or Wheezing  guaiFENesin Oral Liquid (Sugar-Free) 100 milliGRAM(s) Oral every 6 hours PRN Cough  piperacillin/tazobactam IVPB.. 3.375 Gram(s) IV Intermittent every 12 hours  acetaminophen     Tablet .. 650 milliGRAM(s) Oral every 6 hours PRN Temp greater or equal to 38C (100.4F), Mild Pain (1 - 3)  acetaminophen   IVPB .. 1000 milliGRAM(s) IV Intermittent once  LORazepam   Injectable 1 milliGRAM(s) IV Push every 8 hours PRN Agitation  melatonin 3 milliGRAM(s) Oral at bedtime PRN Insomnia  ondansetron Injectable 4 milliGRAM(s) IV Push every 8 hours PRN Nausea and/or Vomiting  aluminum hydroxide/magnesium hydroxide/simethicone Suspension 30 milliLiter(s) Oral every 4 hours PRN Dyspepsia  calcium carbonate    500 mG (Tums) Chewable 1 Tablet(s) Chew two times a day  pantoprazole    Tablet 40 milliGRAM(s) Oral before breakfast  fludroCORTISONE 0.1 milliGRAM(s) Oral daily  levothyroxine 25 MICROGram(s) Oral daily  calcitriol   Capsule 0.25 MICROGram(s) Oral daily  epoetin dev (PROCRIT) Injectable 18574 Unit(s) SubCutaneous every 7 days  folic acid 1 milliGRAM(s) Oral daily  iron sucrose IVPB 200 milliGRAM(s) IV Intermittent every 24 hours, Stop order after: 5 Doses  multivitamin 1 Tablet(s) Oral daily  sodium bicarbonate 1300 milliGRAM(s) Oral three times a day  sodium chloride 0.9% lock flush 3 milliLiter(s) IV Push every 8 hours      LABS:	 	  ( 01 Jan 2024 11:31 )  Troponin T  X    ,  CPK  106  , CKMB  X    , BNP X        , ( 01 Jan 2024 03:50 )  Troponin T  X    ,  CPK  141  , CKMB  X    , BNP X                                  7.9    12.93 )-----------( 271      ( 06 Jan 2024 06:04 )             26.8     01-06    141  |  105  |  34.5<H>  ----------------------------<  111<H>  4.7   |  24.0  |  4.35<H>    Ca    8.4      06 Jan 2024 06:04  Phos  4.9     01-05  Mg     1.8     01-05      PT/INR/PTT ( 02 Jan 2024 23:51 )                       :                       :      14.0         :       28.9                  .        .                   .              .           .       1.27        .                                       Lipid Profile:   HgA1c:   TSH: Thyroid Stimulating Hormone, Serum: 3.55 uIU/mL  Thyroid Stimulating Hormone, Serum: 1.99 uIU/mL      TELEMETRY: Sinus bradycardic 40s with HR as high as 56   ECG: NSR     DIAGNOSTIC TESTING:  [x ] Echocardiogram:  < from: TTE W or WO Ultrasound Enhancing Agent (01.03.24 @ 12:49) >   1. Left ventricular cavity is normal. Left ventricular wall thickness is normal. The interventricular septum is flattened in systole consistent with right ventricular pressure overload. Left ventricular systolic function is normal with an ejection fraction visually estimated at 55 to 60 %.   2. Normal left ventricular diastolic function.   3. Mildly enlarged right ventricular cavity size and systolic function.   4. The left atrium is normal.   5. The right atrium is mildly dilated in size.   6. Thickened mitral valve leaflets. Mild mitral valve leaflet calcification.   7. Mild mitral regurgitation.   8. Trileaflet aortic valve with normal systolic excursion.   9. Moderate tricuspid regurgitation.  10. Moderate pulmonic regurgitation.  11. No pericardial effusion seen.  12. Secundum atrial septal defect with bidrectional shunting.  13. Estimated pulmonary artery systolic pressure is 31 mmHg, consistent with normal pulmonary artery pressure.    < end of copied text >    [ ]  Catheterization:  [ ] Stress Test:    OTHER:

## 2024-01-06 NOTE — PROGRESS NOTE ADULT - ASSESSMENT
24 y/o female whose real name is Theresa Tsang. brought from [facility] for evaluation. Patient was apparently in her normal state of health at a [employer] event and shortly after eating rice/beans and meat started c/o abdominal pain and cramping and then vomited. Friends who witnessed it said they called EMS due to patient being very agitated. She has a hx of "kidney problems" and used to be on medications for it but self discontinued " a while ago."Labs with Hbg of 8.6 and Cr. of 3.95 with reported baseline of 2.5. No reported mental health history. Patient now at mental baseline however hypotension in setting of possible AI? Sinus bradycardia and awaiting MR abdomen for renal lesions due to poor outpatient follow up.     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 Pain related?   -CTH neg  -Utox positive for benzo likely given en route to ER  -Back to baseline now   -Monitor     Anemia  -Likely on basis of CKD  -Low iron stores, started on Venofer   -FOBT negative     COVID-19/Leukocytosis  -No hypoxia  -Supportive Care  -DC zosyn, s/p 3 days  -Bcx negative to date  -Increased wbc likely due to steroids     Hypocalcemia  -Replete      DVT Prophylaxis -- Venodyne  Dispo: Home once stable.    22 y/o female whose real name is Theresa Tsang. brought from [facility] for evaluation. Patient was apparently in her normal state of health at a [employer] event and shortly after eating rice/beans and meat started c/o abdominal pain and cramping and then vomited. Friends who witnessed it said they called EMS due to patient being very agitated. She has a hx of "kidney problems" and used to be on medications for it but self discontinued " a while ago."Labs with Hbg of 8.6 and Cr. of 3.95 with reported baseline of 2.5. No reported mental health history. Patient now at mental baseline however hypotension in setting of possible AI? Sinus bradycardia and awaiting MR abdomen for renal lesions due to poor outpatient follow up.     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 Pain related?   -CTH neg  -Utox positive for benzo likely given en route to ER  -Back to baseline now   -Monitor     Anemia  -Likely on basis of CKD  -Low iron stores, started on Venofer   -FOBT negative     COVID-19/Leukocytosis  -No hypoxia  -Supportive Care  -DC zosyn, s/p 3 days  -Bcx negative to date  -Increased wbc likely due to steroids     Hypocalcemia  -Replete      DVT Prophylaxis -- Venodyne  Dispo: Home once stable.

## 2024-01-06 NOTE — PROGRESS NOTE ADULT - PROBLEM SELECTOR PLAN 1
- review of strips revealed NSR  - TSH is normal 3.55  - Pending T4 and T3. Thyroid sonogram per endocrinology   - BP is normotensive, off midodrine   - TTE EF 50-60%, mild MR, moderate TR, moderate pulmonic regurgitation, atrial septal defect with bidirectional shunting noted  - consider outpatient ASD closure    - unclear etiology. minimize medications.   - monitor on telemetry while in patient - review of strips revealed NSR  - likely autonomic dysfunction   - trend troponins.   - TSH is normal 3.55  - Pending T4 and T3. Thyroid sonogram per endocrinology   - BP is normotensive, off midodrine   - TTE EF 50-60%, mild MR, moderate TR, moderate pulmonic regurgitation, atrial septal defect with bidirectional shunting noted  - consider outpatient ASD closure    - unclear etiology. minimize medications that causes bradycardia.   - monitor on telemetry while in patient    No further inpatient cardiac work up at this time.  Will sign off.  Please reconsult if needed.

## 2024-01-06 NOTE — PROGRESS NOTE ADULT - NS ATTEND AMEND GEN_ALL_CORE FT
autnomic dysfunction and sinus bradycardia.  very  likely due to adrenal insufficiency induce bradycardia.  steroid replacement as per Endocrinologist.   no pathological conduction defects suspected.  recent covid may have worsen the adrenal insuficince. consider stress dose steroids.  off midodrine. consider home replacement for BP if needed isntead of diorect vasopressors.  no further in patient cardaic work up I snededd.   will sign off.

## 2024-01-06 NOTE — PROGRESS NOTE ADULT - SUBJECTIVE AND OBJECTIVE BOX
SUBJECTIVE / OVERNIGHT EVENTS: Seen and examined. Used  Richard. Endorses mild generalized abdominal pain. worse when eating. Denies chest pain, SOB, N/V, fever, chills, dysuria or any other complaints. All remainder ROS negative.     MEDICATIONS  (STANDING):  acetaminophen   IVPB .. 1000 milliGRAM(s) IV Intermittent once  calcitriol   Capsule 0.25 MICROGram(s) Oral daily  calcium carbonate    500 mG (Tums) Chewable 1 Tablet(s) Chew two times a day  epoetin dev (PROCRIT) Injectable 83546 Unit(s) SubCutaneous every 7 days  fludroCORTISONE 0.1 milliGRAM(s) Oral daily  folic acid 1 milliGRAM(s) Oral daily  iron sucrose IVPB 200 milliGRAM(s) IV Intermittent every 24 hours  levothyroxine 25 MICROGram(s) Oral daily  midodrine. 10 milliGRAM(s) Oral once  multivitamin 1 Tablet(s) Oral daily  pantoprazole    Tablet 40 milliGRAM(s) Oral before breakfast  piperacillin/tazobactam IVPB.. 3.375 Gram(s) IV Intermittent every 12 hours  sodium bicarbonate 1300 milliGRAM(s) Oral three times a day  sodium chloride 0.9% lock flush 3 milliLiter(s) IV Push every 8 hours    MEDICATIONS  (PRN):  acetaminophen     Tablet .. 650 milliGRAM(s) Oral every 6 hours PRN Temp greater or equal to 38C (100.4F), Mild Pain (1 - 3)  albuterol    90 MICROgram(s) HFA Inhaler 2 Puff(s) Inhalation every 6 hours PRN Shortness of Breath and/or Wheezing  aluminum hydroxide/magnesium hydroxide/simethicone Suspension 30 milliLiter(s) Oral every 4 hours PRN Dyspepsia  guaiFENesin Oral Liquid (Sugar-Free) 100 milliGRAM(s) Oral every 6 hours PRN Cough  LORazepam   Injectable 1 milliGRAM(s) IV Push every 8 hours PRN Agitation  melatonin 3 milliGRAM(s) Oral at bedtime PRN Insomnia  ondansetron Injectable 4 milliGRAM(s) IV Push every 8 hours PRN Nausea and/or Vomiting        I&O's Summary    05 Jan 2024 07:01  -  06 Jan 2024 07:00  --------------------------------------------------------  IN: 210 mL / OUT: 0 mL / NET: 210 mL        PHYSICAL EXAM:  Vital Signs Last 24 Hrs  T(C): 36.8 (06 Jan 2024 10:58), Max: 36.8 (06 Jan 2024 10:58)  T(F): 98.2 (06 Jan 2024 10:58), Max: 98.2 (06 Jan 2024 10:58)  HR: 51 (06 Jan 2024 10:58) (46 - 69)  BP: 110/70 (06 Jan 2024 10:58) (102/61 - 118/74)  BP(mean): --  RR: 18 (06 Jan 2024 10:58) (18 - 18)  SpO2: 95% (06 Jan 2024 10:58) (94% - 96%)    Parameters below as of 06 Jan 2024 04:48  Patient On (Oxygen Delivery Method): room air        General: Young female laying in bed comfortably. No acute distress  HEENT: EOMI. Clear conjunctivae. Moist mucus membrane  Neck: Supple.   Chest: CTA bilaterally. No wheezing, rales or rhonchi.   Heart: Normal S1 & S2. RRR.   Abdomen: Non distended. Soft. Non-tender. + BS  Ext: No pedal edema. No calf tenderness   Neuro: AAO x 3. No focal deficit. No speech disorder  Skin: Warm and Dry  Psychiatry: Normal mood and affect    LABS:                        7.9    12.93 )-----------( 271      ( 06 Jan 2024 06:04 )             26.8     01-06    141  |  105  |  34.5<H>  ----------------------------<  111<H>  4.7   |  24.0  |  4.35<H>    Ca    8.4      06 Jan 2024 06:04  Phos  4.9     01-05  Mg     1.8     01-05            Urinalysis Basic - ( 06 Jan 2024 06:04 )    Color: x / Appearance: x / SG: x / pH: x  Gluc: 111 mg/dL / Ketone: x  / Bili: x / Urobili: x   Blood: x / Protein: x / Nitrite: x   Leuk Esterase: x / RBC: x / WBC x   Sq Epi: x / Non Sq Epi: x / Bacteria: x        CAPILLARY BLOOD GLUCOSE            RADIOLOGY & ADDITIONAL TESTS:  Results Reviewed:   Imaging Personally Reviewed:  Electrocardiogram Personally Reviewed:         SUBJECTIVE / OVERNIGHT EVENTS: Seen and examined. Used  Richard. Endorses mild generalized abdominal pain. worse when eating. Denies chest pain, SOB, N/V, fever, chills, dysuria or any other complaints. All remainder ROS negative.     MEDICATIONS  (STANDING):  acetaminophen   IVPB .. 1000 milliGRAM(s) IV Intermittent once  calcitriol   Capsule 0.25 MICROGram(s) Oral daily  calcium carbonate    500 mG (Tums) Chewable 1 Tablet(s) Chew two times a day  epoetin dev (PROCRIT) Injectable 82329 Unit(s) SubCutaneous every 7 days  fludroCORTISONE 0.1 milliGRAM(s) Oral daily  folic acid 1 milliGRAM(s) Oral daily  iron sucrose IVPB 200 milliGRAM(s) IV Intermittent every 24 hours  levothyroxine 25 MICROGram(s) Oral daily  midodrine. 10 milliGRAM(s) Oral once  multivitamin 1 Tablet(s) Oral daily  pantoprazole    Tablet 40 milliGRAM(s) Oral before breakfast  piperacillin/tazobactam IVPB.. 3.375 Gram(s) IV Intermittent every 12 hours  sodium bicarbonate 1300 milliGRAM(s) Oral three times a day  sodium chloride 0.9% lock flush 3 milliLiter(s) IV Push every 8 hours    MEDICATIONS  (PRN):  acetaminophen     Tablet .. 650 milliGRAM(s) Oral every 6 hours PRN Temp greater or equal to 38C (100.4F), Mild Pain (1 - 3)  albuterol    90 MICROgram(s) HFA Inhaler 2 Puff(s) Inhalation every 6 hours PRN Shortness of Breath and/or Wheezing  aluminum hydroxide/magnesium hydroxide/simethicone Suspension 30 milliLiter(s) Oral every 4 hours PRN Dyspepsia  guaiFENesin Oral Liquid (Sugar-Free) 100 milliGRAM(s) Oral every 6 hours PRN Cough  LORazepam   Injectable 1 milliGRAM(s) IV Push every 8 hours PRN Agitation  melatonin 3 milliGRAM(s) Oral at bedtime PRN Insomnia  ondansetron Injectable 4 milliGRAM(s) IV Push every 8 hours PRN Nausea and/or Vomiting        I&O's Summary    05 Jan 2024 07:01  -  06 Jan 2024 07:00  --------------------------------------------------------  IN: 210 mL / OUT: 0 mL / NET: 210 mL        PHYSICAL EXAM:  Vital Signs Last 24 Hrs  T(C): 36.8 (06 Jan 2024 10:58), Max: 36.8 (06 Jan 2024 10:58)  T(F): 98.2 (06 Jan 2024 10:58), Max: 98.2 (06 Jan 2024 10:58)  HR: 51 (06 Jan 2024 10:58) (46 - 69)  BP: 110/70 (06 Jan 2024 10:58) (102/61 - 118/74)  BP(mean): --  RR: 18 (06 Jan 2024 10:58) (18 - 18)  SpO2: 95% (06 Jan 2024 10:58) (94% - 96%)    Parameters below as of 06 Jan 2024 04:48  Patient On (Oxygen Delivery Method): room air        General: Young female laying in bed comfortably. No acute distress  HEENT: EOMI. Clear conjunctivae. Moist mucus membrane  Neck: Supple.   Chest: CTA bilaterally. No wheezing, rales or rhonchi.   Heart: Normal S1 & S2. RRR.   Abdomen: Non distended. Soft. Non-tender. + BS  Ext: No pedal edema. No calf tenderness   Neuro: AAO x 3. No focal deficit. No speech disorder  Skin: Warm and Dry  Psychiatry: Normal mood and affect    LABS:                        7.9    12.93 )-----------( 271      ( 06 Jan 2024 06:04 )             26.8     01-06    141  |  105  |  34.5<H>  ----------------------------<  111<H>  4.7   |  24.0  |  4.35<H>    Ca    8.4      06 Jan 2024 06:04  Phos  4.9     01-05  Mg     1.8     01-05            Urinalysis Basic - ( 06 Jan 2024 06:04 )    Color: x / Appearance: x / SG: x / pH: x  Gluc: 111 mg/dL / Ketone: x  / Bili: x / Urobili: x   Blood: x / Protein: x / Nitrite: x   Leuk Esterase: x / RBC: x / WBC x   Sq Epi: x / Non Sq Epi: x / Bacteria: x        CAPILLARY BLOOD GLUCOSE            RADIOLOGY & ADDITIONAL TESTS:  Results Reviewed:   Imaging Personally Reviewed:  Electrocardiogram Personally Reviewed:

## 2024-01-07 LAB
ALBUMIN SERPL ELPH-MCNC: 3.1 G/DL — LOW (ref 3.3–5.2)
ALBUMIN SERPL ELPH-MCNC: 3.1 G/DL — LOW (ref 3.3–5.2)
ALP SERPL-CCNC: 43 U/L — SIGNIFICANT CHANGE UP (ref 40–120)
ALP SERPL-CCNC: 43 U/L — SIGNIFICANT CHANGE UP (ref 40–120)
ALT FLD-CCNC: 147 U/L — HIGH
ALT FLD-CCNC: 147 U/L — HIGH
ANION GAP SERPL CALC-SCNC: 12 MMOL/L — SIGNIFICANT CHANGE UP (ref 5–17)
ANION GAP SERPL CALC-SCNC: 12 MMOL/L — SIGNIFICANT CHANGE UP (ref 5–17)
ANISOCYTOSIS BLD QL: SLIGHT — SIGNIFICANT CHANGE UP
ANISOCYTOSIS BLD QL: SLIGHT — SIGNIFICANT CHANGE UP
AST SERPL-CCNC: 87 U/L — HIGH
AST SERPL-CCNC: 87 U/L — HIGH
BASOPHILS # BLD AUTO: 0.23 K/UL — HIGH (ref 0–0.2)
BASOPHILS # BLD AUTO: 0.23 K/UL — HIGH (ref 0–0.2)
BASOPHILS NFR BLD AUTO: 2.6 % — HIGH (ref 0–2)
BASOPHILS NFR BLD AUTO: 2.6 % — HIGH (ref 0–2)
BILIRUB SERPL-MCNC: <0.2 MG/DL — LOW (ref 0.4–2)
BILIRUB SERPL-MCNC: <0.2 MG/DL — LOW (ref 0.4–2)
BUN SERPL-MCNC: 33.8 MG/DL — HIGH (ref 8–20)
BUN SERPL-MCNC: 33.8 MG/DL — HIGH (ref 8–20)
CALCIUM SERPL-MCNC: 8.1 MG/DL — LOW (ref 8.4–10.5)
CALCIUM SERPL-MCNC: 8.1 MG/DL — LOW (ref 8.4–10.5)
CHLORIDE SERPL-SCNC: 106 MMOL/L — SIGNIFICANT CHANGE UP (ref 96–108)
CHLORIDE SERPL-SCNC: 106 MMOL/L — SIGNIFICANT CHANGE UP (ref 96–108)
CO2 SERPL-SCNC: 27 MMOL/L — SIGNIFICANT CHANGE UP (ref 22–29)
CO2 SERPL-SCNC: 27 MMOL/L — SIGNIFICANT CHANGE UP (ref 22–29)
CREAT SERPL-MCNC: 4.35 MG/DL — HIGH (ref 0.5–1.3)
CREAT SERPL-MCNC: 4.35 MG/DL — HIGH (ref 0.5–1.3)
DACRYOCYTES BLD QL SMEAR: SLIGHT — SIGNIFICANT CHANGE UP
DACRYOCYTES BLD QL SMEAR: SLIGHT — SIGNIFICANT CHANGE UP
EGFR: 14 ML/MIN/1.73M2 — LOW
EGFR: 14 ML/MIN/1.73M2 — LOW
EOSINOPHIL # BLD AUTO: 0 K/UL — SIGNIFICANT CHANGE UP (ref 0–0.5)
EOSINOPHIL # BLD AUTO: 0 K/UL — SIGNIFICANT CHANGE UP (ref 0–0.5)
EOSINOPHIL NFR BLD AUTO: 0 % — SIGNIFICANT CHANGE UP (ref 0–6)
EOSINOPHIL NFR BLD AUTO: 0 % — SIGNIFICANT CHANGE UP (ref 0–6)
GIANT PLATELETS BLD QL SMEAR: PRESENT — SIGNIFICANT CHANGE UP
GIANT PLATELETS BLD QL SMEAR: PRESENT — SIGNIFICANT CHANGE UP
GLUCOSE SERPL-MCNC: 84 MG/DL — SIGNIFICANT CHANGE UP (ref 70–99)
GLUCOSE SERPL-MCNC: 84 MG/DL — SIGNIFICANT CHANGE UP (ref 70–99)
HCT VFR BLD CALC: 24.9 % — LOW (ref 34.5–45)
HCT VFR BLD CALC: 24.9 % — LOW (ref 34.5–45)
HGB BLD-MCNC: 7.9 G/DL — LOW (ref 11.5–15.5)
HGB BLD-MCNC: 7.9 G/DL — LOW (ref 11.5–15.5)
HYPOCHROMIA BLD QL: SLIGHT — SIGNIFICANT CHANGE UP
HYPOCHROMIA BLD QL: SLIGHT — SIGNIFICANT CHANGE UP
LYMPHOCYTES # BLD AUTO: 3.07 K/UL — SIGNIFICANT CHANGE UP (ref 1–3.3)
LYMPHOCYTES # BLD AUTO: 3.07 K/UL — SIGNIFICANT CHANGE UP (ref 1–3.3)
LYMPHOCYTES # BLD AUTO: 35.1 % — SIGNIFICANT CHANGE UP (ref 13–44)
LYMPHOCYTES # BLD AUTO: 35.1 % — SIGNIFICANT CHANGE UP (ref 13–44)
MANUAL SMEAR VERIFICATION: SIGNIFICANT CHANGE UP
MANUAL SMEAR VERIFICATION: SIGNIFICANT CHANGE UP
MCHC RBC-ENTMCNC: 28 PG — SIGNIFICANT CHANGE UP (ref 27–34)
MCHC RBC-ENTMCNC: 28 PG — SIGNIFICANT CHANGE UP (ref 27–34)
MCHC RBC-ENTMCNC: 31.7 GM/DL — LOW (ref 32–36)
MCHC RBC-ENTMCNC: 31.7 GM/DL — LOW (ref 32–36)
MCV RBC AUTO: 88.3 FL — SIGNIFICANT CHANGE UP (ref 80–100)
MCV RBC AUTO: 88.3 FL — SIGNIFICANT CHANGE UP (ref 80–100)
METAMYELOCYTES # FLD: 1.8 % — HIGH (ref 0–0)
METAMYELOCYTES # FLD: 1.8 % — HIGH (ref 0–0)
MICROCYTES BLD QL: SLIGHT — SIGNIFICANT CHANGE UP
MICROCYTES BLD QL: SLIGHT — SIGNIFICANT CHANGE UP
MONOCYTES # BLD AUTO: 0.31 K/UL — SIGNIFICANT CHANGE UP (ref 0–0.9)
MONOCYTES # BLD AUTO: 0.31 K/UL — SIGNIFICANT CHANGE UP (ref 0–0.9)
MONOCYTES NFR BLD AUTO: 3.5 % — SIGNIFICANT CHANGE UP (ref 2–14)
MONOCYTES NFR BLD AUTO: 3.5 % — SIGNIFICANT CHANGE UP (ref 2–14)
MYELOCYTES NFR BLD: 1.7 % — HIGH (ref 0–0)
MYELOCYTES NFR BLD: 1.7 % — HIGH (ref 0–0)
NEUTROPHILS # BLD AUTO: 4.76 K/UL — SIGNIFICANT CHANGE UP (ref 1.8–7.4)
NEUTROPHILS # BLD AUTO: 4.76 K/UL — SIGNIFICANT CHANGE UP (ref 1.8–7.4)
NEUTROPHILS NFR BLD AUTO: 54.4 % — SIGNIFICANT CHANGE UP (ref 43–77)
NEUTROPHILS NFR BLD AUTO: 54.4 % — SIGNIFICANT CHANGE UP (ref 43–77)
NRBC # BLD: 2 /100 WBCS — HIGH (ref 0–0)
NRBC # BLD: 2 /100 WBCS — HIGH (ref 0–0)
OVALOCYTES BLD QL SMEAR: SLIGHT — SIGNIFICANT CHANGE UP
OVALOCYTES BLD QL SMEAR: SLIGHT — SIGNIFICANT CHANGE UP
PHOSPHATE SERPL-MCNC: 2.8 MG/DL — SIGNIFICANT CHANGE UP (ref 2.4–4.7)
PHOSPHATE SERPL-MCNC: 2.8 MG/DL — SIGNIFICANT CHANGE UP (ref 2.4–4.7)
PLAT MORPH BLD: NORMAL — SIGNIFICANT CHANGE UP
PLAT MORPH BLD: NORMAL — SIGNIFICANT CHANGE UP
PLATELET # BLD AUTO: 216 K/UL — SIGNIFICANT CHANGE UP (ref 150–400)
PLATELET # BLD AUTO: 216 K/UL — SIGNIFICANT CHANGE UP (ref 150–400)
POIKILOCYTOSIS BLD QL AUTO: SLIGHT — SIGNIFICANT CHANGE UP
POIKILOCYTOSIS BLD QL AUTO: SLIGHT — SIGNIFICANT CHANGE UP
POLYCHROMASIA BLD QL SMEAR: SLIGHT — SIGNIFICANT CHANGE UP
POLYCHROMASIA BLD QL SMEAR: SLIGHT — SIGNIFICANT CHANGE UP
POTASSIUM SERPL-MCNC: 3.5 MMOL/L — SIGNIFICANT CHANGE UP (ref 3.5–5.3)
POTASSIUM SERPL-MCNC: 3.5 MMOL/L — SIGNIFICANT CHANGE UP (ref 3.5–5.3)
POTASSIUM SERPL-SCNC: 3.5 MMOL/L — SIGNIFICANT CHANGE UP (ref 3.5–5.3)
POTASSIUM SERPL-SCNC: 3.5 MMOL/L — SIGNIFICANT CHANGE UP (ref 3.5–5.3)
PROT SERPL-MCNC: 5.5 G/DL — LOW (ref 6.6–8.7)
PROT SERPL-MCNC: 5.5 G/DL — LOW (ref 6.6–8.7)
RBC # BLD: 2.82 M/UL — LOW (ref 3.8–5.2)
RBC # BLD: 2.82 M/UL — LOW (ref 3.8–5.2)
RBC # FLD: 16 % — HIGH (ref 10.3–14.5)
RBC # FLD: 16 % — HIGH (ref 10.3–14.5)
RBC BLD AUTO: ABNORMAL
RBC BLD AUTO: ABNORMAL
SMUDGE CELLS # BLD: PRESENT — SIGNIFICANT CHANGE UP
SMUDGE CELLS # BLD: PRESENT — SIGNIFICANT CHANGE UP
SODIUM SERPL-SCNC: 145 MMOL/L — SIGNIFICANT CHANGE UP (ref 135–145)
SODIUM SERPL-SCNC: 145 MMOL/L — SIGNIFICANT CHANGE UP (ref 135–145)
TSH RECEP AB FLD-ACNC: <1.1 IU/L — SIGNIFICANT CHANGE UP (ref 0–1.75)
TSH RECEP AB FLD-ACNC: <1.1 IU/L — SIGNIFICANT CHANGE UP (ref 0–1.75)
VARIANT LYMPHS # BLD: 0.9 % — SIGNIFICANT CHANGE UP (ref 0–6)
VARIANT LYMPHS # BLD: 0.9 % — SIGNIFICANT CHANGE UP (ref 0–6)
WBC # BLD: 8.75 K/UL — SIGNIFICANT CHANGE UP (ref 3.8–10.5)
WBC # BLD: 8.75 K/UL — SIGNIFICANT CHANGE UP (ref 3.8–10.5)
WBC # FLD AUTO: 8.75 K/UL — SIGNIFICANT CHANGE UP (ref 3.8–10.5)
WBC # FLD AUTO: 8.75 K/UL — SIGNIFICANT CHANGE UP (ref 3.8–10.5)

## 2024-01-07 PROCEDURE — 99233 SBSQ HOSP IP/OBS HIGH 50: CPT

## 2024-01-07 RX ADMIN — SODIUM CHLORIDE 3 MILLILITER(S): 9 INJECTION INTRAMUSCULAR; INTRAVENOUS; SUBCUTANEOUS at 21:37

## 2024-01-07 RX ADMIN — CALCITRIOL 0.25 MICROGRAM(S): 0.5 CAPSULE ORAL at 08:53

## 2024-01-07 RX ADMIN — Medication 1300 MILLIGRAM(S): at 06:15

## 2024-01-07 RX ADMIN — FLUDROCORTISONE ACETATE 0.1 MILLIGRAM(S): 0.1 TABLET ORAL at 06:15

## 2024-01-07 RX ADMIN — PANTOPRAZOLE SODIUM 40 MILLIGRAM(S): 20 TABLET, DELAYED RELEASE ORAL at 06:15

## 2024-01-07 RX ADMIN — SODIUM CHLORIDE 3 MILLILITER(S): 9 INJECTION INTRAMUSCULAR; INTRAVENOUS; SUBCUTANEOUS at 06:16

## 2024-01-07 RX ADMIN — Medication 25 MICROGRAM(S): at 06:15

## 2024-01-07 RX ADMIN — Medication 1300 MILLIGRAM(S): at 13:03

## 2024-01-07 RX ADMIN — Medication 1 TABLET(S): at 08:54

## 2024-01-07 RX ADMIN — Medication 1 TABLET(S): at 16:32

## 2024-01-07 RX ADMIN — Medication 1 TABLET(S): at 06:15

## 2024-01-07 RX ADMIN — Medication 1300 MILLIGRAM(S): at 21:31

## 2024-01-07 RX ADMIN — Medication 1 MILLIGRAM(S): at 08:54

## 2024-01-07 RX ADMIN — SODIUM CHLORIDE 3 MILLILITER(S): 9 INJECTION INTRAMUSCULAR; INTRAVENOUS; SUBCUTANEOUS at 12:13

## 2024-01-07 NOTE — PROGRESS NOTE ADULT - SUBJECTIVE AND OBJECTIVE BOX
NEPHROLOGY INTERVAL HPI/OVERNIGHT EVENTS:  pt offering no complaints  no adverse overnight events reported    MEDICATIONS  (STANDING):  acetaminophen   IVPB .. 1000 milliGRAM(s) IV Intermittent once  calcitriol   Capsule 0.25 MICROGram(s) Oral daily  calcium carbonate    500 mG (Tums) Chewable 1 Tablet(s) Chew two times a day  epoetin dev (PROCRIT) Injectable 22288 Unit(s) SubCutaneous every 7 days  fludroCORTISONE 0.1 milliGRAM(s) Oral daily  folic acid 1 milliGRAM(s) Oral daily  levothyroxine 25 MICROGram(s) Oral daily  midodrine. 10 milliGRAM(s) Oral once  multivitamin 1 Tablet(s) Oral daily  pantoprazole    Tablet 40 milliGRAM(s) Oral before breakfast  sodium bicarbonate 1300 milliGRAM(s) Oral three times a day  sodium chloride 0.9% lock flush 3 milliLiter(s) IV Push every 8 hours    MEDICATIONS  (PRN):  acetaminophen     Tablet .. 650 milliGRAM(s) Oral every 6 hours PRN Temp greater or equal to 38C (100.4F), Mild Pain (1 - 3)  albuterol    90 MICROgram(s) HFA Inhaler 2 Puff(s) Inhalation every 6 hours PRN Shortness of Breath and/or Wheezing  aluminum hydroxide/magnesium hydroxide/simethicone Suspension 30 milliLiter(s) Oral every 4 hours PRN Dyspepsia  guaiFENesin Oral Liquid (Sugar-Free) 100 milliGRAM(s) Oral every 6 hours PRN Cough  LORazepam   Injectable 1 milliGRAM(s) IV Push every 8 hours PRN Agitation  melatonin 3 milliGRAM(s) Oral at bedtime PRN Insomnia  ondansetron Injectable 4 milliGRAM(s) IV Push every 8 hours PRN Nausea and/or Vomiting      Allergies    No Known Allergies          Vital Signs Last 24 Hrs  T(C): 37.1 (07 Jan 2024 04:53), Max: 37.1 (07 Jan 2024 04:53)  T(F): 98.7 (07 Jan 2024 04:53), Max: 98.7 (07 Jan 2024 04:53)  HR: 62 (07 Jan 2024 04:53) (51 - 62)  BP: 94/61 (07 Jan 2024 04:53) (94/61 - 110/70)  BP(mean): --  RR: 16 (07 Jan 2024 04:53) (16 - 18)  SpO2: 95% (07 Jan 2024 04:53) (95% - 100%)    Parameters below as of 07 Jan 2024 04:53  Patient On (Oxygen Delivery Method): room air        PHYSICAL EXAM:  GENERAL: Fatigued, weak  HEENT: Moist mucous membranes  NECK: Supple; no JVD  NERVOUS SYSTEM: Awake, alert  EXTREMITIES: no dependent edema  Further exam deferred to limit COVID exposure    LABS:                        7.9    12.93 )-----------( 271      ( 06 Jan 2024 06:04 )             26.8     01-06    141  |  105  |  34.5<H>  ----------------------------<  111<H>  4.7   |  24.0  |  4.35<H>    Ca    8.4      06 Jan 2024 06:04        Urinalysis Basic - ( 06 Jan 2024 06:04 )    Color: x / Appearance: x / SG: x / pH: x  Gluc: 111 mg/dL / Ketone: x  / Bili: x / Urobili: x   Blood: x / Protein: x / Nitrite: x   Leuk Esterase: x / RBC: x / WBC x   Sq Epi: x / Non Sq Epi: x / Bacteria: x          RADIOLOGY & ADDITIONAL TESTS:  < from: US Kidney and Bladder (01.01.24 @ 08:46) >    ACC: [id number] EXAM:  US KIDNEYS AND BLADDER   ORDERED BY: LORRAINE KIM     PROCEDURE DATE:  01/01/2024          INTERPRETATION:  CLINICAL INFORMATION: Worsening renal failure.    COMPARISON: None available.    TECHNIQUE: Sonography of thekidneys and bladder.    FINDINGS:  Right kidney: 6.0 cm. Atrophic. No hydronephrosis or calculi. Upper pole   cyst measuring 0.8 x 0.8 x 0.8 cm. Midpole isoechoic mass versus   prominent cortical lobulation measuring 2.6 x 2.6 x 2.6 cm.    Left kidney: 7.2 cm. Atrophic. No hydronephrosis or calculi. Upper pole   cyst measuring 0.9 x 0.8 x 0.7 cm.    Urinary bladder: Bilateral urinary jets visualized.    IMPRESSION:  Atrophic kidneys. No hydronephrosis.    Right renal mass versus prominent cortical lobulation. Recommend renal   protocol CT or MRI for further evaluation when clinically feasible.    < end of copied text >   NEPHROLOGY INTERVAL HPI/OVERNIGHT EVENTS:  pt offering no complaints  no adverse overnight events reported    MEDICATIONS  (STANDING):  acetaminophen   IVPB .. 1000 milliGRAM(s) IV Intermittent once  calcitriol   Capsule 0.25 MICROGram(s) Oral daily  calcium carbonate    500 mG (Tums) Chewable 1 Tablet(s) Chew two times a day  epoetin dev (PROCRIT) Injectable 46919 Unit(s) SubCutaneous every 7 days  fludroCORTISONE 0.1 milliGRAM(s) Oral daily  folic acid 1 milliGRAM(s) Oral daily  levothyroxine 25 MICROGram(s) Oral daily  midodrine. 10 milliGRAM(s) Oral once  multivitamin 1 Tablet(s) Oral daily  pantoprazole    Tablet 40 milliGRAM(s) Oral before breakfast  sodium bicarbonate 1300 milliGRAM(s) Oral three times a day  sodium chloride 0.9% lock flush 3 milliLiter(s) IV Push every 8 hours    MEDICATIONS  (PRN):  acetaminophen     Tablet .. 650 milliGRAM(s) Oral every 6 hours PRN Temp greater or equal to 38C (100.4F), Mild Pain (1 - 3)  albuterol    90 MICROgram(s) HFA Inhaler 2 Puff(s) Inhalation every 6 hours PRN Shortness of Breath and/or Wheezing  aluminum hydroxide/magnesium hydroxide/simethicone Suspension 30 milliLiter(s) Oral every 4 hours PRN Dyspepsia  guaiFENesin Oral Liquid (Sugar-Free) 100 milliGRAM(s) Oral every 6 hours PRN Cough  LORazepam   Injectable 1 milliGRAM(s) IV Push every 8 hours PRN Agitation  melatonin 3 milliGRAM(s) Oral at bedtime PRN Insomnia  ondansetron Injectable 4 milliGRAM(s) IV Push every 8 hours PRN Nausea and/or Vomiting      Allergies    No Known Allergies          Vital Signs Last 24 Hrs  T(C): 37.1 (07 Jan 2024 04:53), Max: 37.1 (07 Jan 2024 04:53)  T(F): 98.7 (07 Jan 2024 04:53), Max: 98.7 (07 Jan 2024 04:53)  HR: 62 (07 Jan 2024 04:53) (51 - 62)  BP: 94/61 (07 Jan 2024 04:53) (94/61 - 110/70)  BP(mean): --  RR: 16 (07 Jan 2024 04:53) (16 - 18)  SpO2: 95% (07 Jan 2024 04:53) (95% - 100%)    Parameters below as of 07 Jan 2024 04:53  Patient On (Oxygen Delivery Method): room air        PHYSICAL EXAM:  GENERAL: Fatigued, weak  HEENT: Moist mucous membranes  NECK: Supple; no JVD  NERVOUS SYSTEM: Awake, alert  EXTREMITIES: no dependent edema  Further exam deferred to limit COVID exposure    LABS:                        7.9    12.93 )-----------( 271      ( 06 Jan 2024 06:04 )             26.8     01-06    141  |  105  |  34.5<H>  ----------------------------<  111<H>  4.7   |  24.0  |  4.35<H>    Ca    8.4      06 Jan 2024 06:04        Urinalysis Basic - ( 06 Jan 2024 06:04 )    Color: x / Appearance: x / SG: x / pH: x  Gluc: 111 mg/dL / Ketone: x  / Bili: x / Urobili: x   Blood: x / Protein: x / Nitrite: x   Leuk Esterase: x / RBC: x / WBC x   Sq Epi: x / Non Sq Epi: x / Bacteria: x          RADIOLOGY & ADDITIONAL TESTS:  < from: US Kidney and Bladder (01.01.24 @ 08:46) >    ACC: [id number] EXAM:  US KIDNEYS AND BLADDER   ORDERED BY: LORRAINE KIM     PROCEDURE DATE:  01/01/2024          INTERPRETATION:  CLINICAL INFORMATION: Worsening renal failure.    COMPARISON: None available.    TECHNIQUE: Sonography of thekidneys and bladder.    FINDINGS:  Right kidney: 6.0 cm. Atrophic. No hydronephrosis or calculi. Upper pole   cyst measuring 0.8 x 0.8 x 0.8 cm. Midpole isoechoic mass versus   prominent cortical lobulation measuring 2.6 x 2.6 x 2.6 cm.    Left kidney: 7.2 cm. Atrophic. No hydronephrosis or calculi. Upper pole   cyst measuring 0.9 x 0.8 x 0.7 cm.    Urinary bladder: Bilateral urinary jets visualized.    IMPRESSION:  Atrophic kidneys. No hydronephrosis.    Right renal mass versus prominent cortical lobulation. Recommend renal   protocol CT or MRI for further evaluation when clinically feasible.    < end of copied text >   NEPHROLOGY INTERVAL HPI/OVERNIGHT EVENTS:  pt offering no complaints  no adverse overnight events reported  I spoke with her using the [facility] provided virtual     MEDICATIONS  (STANDING):  acetaminophen   IVPB .. 1000 milliGRAM(s) IV Intermittent once  calcitriol   Capsule 0.25 MICROGram(s) Oral daily  calcium carbonate    500 mG (Tums) Chewable 1 Tablet(s) Chew two times a day  epoetin dev (PROCRIT) Injectable 19335 Unit(s) SubCutaneous every 7 days  fludroCORTISONE 0.1 milliGRAM(s) Oral daily  folic acid 1 milliGRAM(s) Oral daily  levothyroxine 25 MICROGram(s) Oral daily  midodrine. 10 milliGRAM(s) Oral once  multivitamin 1 Tablet(s) Oral daily  pantoprazole    Tablet 40 milliGRAM(s) Oral before breakfast  sodium bicarbonate 1300 milliGRAM(s) Oral three times a day  sodium chloride 0.9% lock flush 3 milliLiter(s) IV Push every 8 hours    MEDICATIONS  (PRN):  acetaminophen     Tablet .. 650 milliGRAM(s) Oral every 6 hours PRN Temp greater or equal to 38C (100.4F), Mild Pain (1 - 3)  albuterol    90 MICROgram(s) HFA Inhaler 2 Puff(s) Inhalation every 6 hours PRN Shortness of Breath and/or Wheezing  aluminum hydroxide/magnesium hydroxide/simethicone Suspension 30 milliLiter(s) Oral every 4 hours PRN Dyspepsia  guaiFENesin Oral Liquid (Sugar-Free) 100 milliGRAM(s) Oral every 6 hours PRN Cough  LORazepam   Injectable 1 milliGRAM(s) IV Push every 8 hours PRN Agitation  melatonin 3 milliGRAM(s) Oral at bedtime PRN Insomnia  ondansetron Injectable 4 milliGRAM(s) IV Push every 8 hours PRN Nausea and/or Vomiting      Allergies    No Known Allergies          Vital Signs Last 24 Hrs  T(C): 37.1 (07 Jan 2024 04:53), Max: 37.1 (07 Jan 2024 04:53)  T(F): 98.7 (07 Jan 2024 04:53), Max: 98.7 (07 Jan 2024 04:53)  HR: 62 (07 Jan 2024 04:53) (51 - 62)  BP: 94/61 (07 Jan 2024 04:53) (94/61 - 110/70)  BP(mean): --  RR: 16 (07 Jan 2024 04:53) (16 - 18)  SpO2: 95% (07 Jan 2024 04:53) (95% - 100%)    Parameters below as of 07 Jan 2024 04:53  Patient On (Oxygen Delivery Method): room air        PHYSICAL EXAM:  GENERAL: Fatigued, weak  HEENT: Moist mucous membranes  NECK: Supple; no JVD  NERVOUS SYSTEM: Awake, alert  EXTREMITIES: no dependent edema  Further exam deferred to limit COVID exposure    LABS:                        7.9    12.93 )-----------( 271      ( 06 Jan 2024 06:04 )             26.8     Complete Blood Count + Automated Diff in AM (01.07.24 @ 07:26)   WBC Count: 8.75 K/uL  RBC Count: 2.82 M/uL  Hemoglobin: 7.9 g/dL  Hematocrit: 24.9 %  Mean Cell Volume: 88.3 fl  Mean Cell Hemoglobin: 28.0 pg  Mean Cell Hemoglobin Conc: 31.7 gm/dL  Red Cell Distrib Width: 16.0 %  Platelet Count - Automated: 216 K/uL  Auto Neutrophil #: 4.76 K/uL  Auto Lymphocyte #: 3.07 K/uL  Auto Monocyte #: 0.31 K/uL  Auto Eosinophil #: 0.00 K/uL  Auto Basophil #: 0.23 K/uL  Auto Neutrophil %: 54.4: Differential percentages must be correlated with absolute numbers for   clinical significance. %  Auto Lymphocyte %: 35.1 %  Auto Monocyte %: 3.5 %  Auto Eosinophil %: 0.0 %  Auto Basophil %: 2.6 %01-06    141  |  105  |  34.5<H>  ----------------------------<  111<H>  4.7   |  24.0  |  4.35<H>    Ca    8.4      06 Jan 2024 06:04      Comprehensive Metabolic Panel in AM (01.07.24 @ 07:26)   Sodium: 145 mmol/L  Potassium: 3.5 mmol/L  Chloride: 106: Chloride reference range changed from ..10/26/2022 mmol/L  Carbon Dioxide: 27.0 mmol/L  Anion Gap: 12 mmol/L  Blood Urea Nitrogen: 33.8 mg/dL  Creatinine: 4.35 mg/dL  Glucose: 84 mg/dL  Calcium: 8.1 mg/dL  Protein Total: 5.5 g/dL  Albumin: 3.1 g/dL  Bilirubin Total: <0.2 mg/dL  Alkaline Phosphatase: 43 U/L  Aspartate Aminotransferase (AST/SGOT): 87 U/L  Alanine Aminotransferase (ALT/SGPT): 147 U/L  Urinalysis Basic - ( 06 Jan 2024 06:04 )    Color: x / Appearance: x / SG: x / pH: x  Gluc: 111 mg/dL / Ketone: x  / Bili: x / Urobili: x   Blood: x / Protein: x / Nitrite: x   Leuk Esterase: x / RBC: x / WBC x   Sq Epi: x / Non Sq Epi: x / Bacteria: x          RADIOLOGY & ADDITIONAL TESTS:  < from: US Kidney and Bladder (01.01.24 @ 08:46) >    ACC: [id number] EXAM:  US KIDNEYS AND BLADDER   ORDERED BY: LORRAINE KIM     PROCEDURE DATE:  01/01/2024          INTERPRETATION:  CLINICAL INFORMATION: Worsening renal failure.    COMPARISON: None available.    TECHNIQUE: Sonography of thekidneys and bladder.    FINDINGS:  Right kidney: 6.0 cm. Atrophic. No hydronephrosis or calculi. Upper pole   cyst measuring 0.8 x 0.8 x 0.8 cm. Midpole isoechoic mass versus   prominent cortical lobulation measuring 2.6 x 2.6 x 2.6 cm.    Left kidney: 7.2 cm. Atrophic. No hydronephrosis or calculi. Upper pole   cyst measuring 0.9 x 0.8 x 0.7 cm.    Urinary bladder: Bilateral urinary jets visualized.    IMPRESSION:  Atrophic kidneys. No hydronephrosis.    Right renal mass versus prominent cortical lobulation. Recommend renal   protocol CT or MRI for further evaluation when clinically feasible.    < end of copied text >   NEPHROLOGY INTERVAL HPI/OVERNIGHT EVENTS:  pt offering no complaints  no adverse overnight events reported  I spoke with her using the [facility] provided virtual     MEDICATIONS  (STANDING):  acetaminophen   IVPB .. 1000 milliGRAM(s) IV Intermittent once  calcitriol   Capsule 0.25 MICROGram(s) Oral daily  calcium carbonate    500 mG (Tums) Chewable 1 Tablet(s) Chew two times a day  epoetin dev (PROCRIT) Injectable 95572 Unit(s) SubCutaneous every 7 days  fludroCORTISONE 0.1 milliGRAM(s) Oral daily  folic acid 1 milliGRAM(s) Oral daily  levothyroxine 25 MICROGram(s) Oral daily  midodrine. 10 milliGRAM(s) Oral once  multivitamin 1 Tablet(s) Oral daily  pantoprazole    Tablet 40 milliGRAM(s) Oral before breakfast  sodium bicarbonate 1300 milliGRAM(s) Oral three times a day  sodium chloride 0.9% lock flush 3 milliLiter(s) IV Push every 8 hours    MEDICATIONS  (PRN):  acetaminophen     Tablet .. 650 milliGRAM(s) Oral every 6 hours PRN Temp greater or equal to 38C (100.4F), Mild Pain (1 - 3)  albuterol    90 MICROgram(s) HFA Inhaler 2 Puff(s) Inhalation every 6 hours PRN Shortness of Breath and/or Wheezing  aluminum hydroxide/magnesium hydroxide/simethicone Suspension 30 milliLiter(s) Oral every 4 hours PRN Dyspepsia  guaiFENesin Oral Liquid (Sugar-Free) 100 milliGRAM(s) Oral every 6 hours PRN Cough  LORazepam   Injectable 1 milliGRAM(s) IV Push every 8 hours PRN Agitation  melatonin 3 milliGRAM(s) Oral at bedtime PRN Insomnia  ondansetron Injectable 4 milliGRAM(s) IV Push every 8 hours PRN Nausea and/or Vomiting      Allergies    No Known Allergies          Vital Signs Last 24 Hrs  T(C): 37.1 (07 Jan 2024 04:53), Max: 37.1 (07 Jan 2024 04:53)  T(F): 98.7 (07 Jan 2024 04:53), Max: 98.7 (07 Jan 2024 04:53)  HR: 62 (07 Jan 2024 04:53) (51 - 62)  BP: 94/61 (07 Jan 2024 04:53) (94/61 - 110/70)  BP(mean): --  RR: 16 (07 Jan 2024 04:53) (16 - 18)  SpO2: 95% (07 Jan 2024 04:53) (95% - 100%)    Parameters below as of 07 Jan 2024 04:53  Patient On (Oxygen Delivery Method): room air        PHYSICAL EXAM:  GENERAL: Fatigued, weak  HEENT: Moist mucous membranes  NECK: Supple; no JVD  NERVOUS SYSTEM: Awake, alert  EXTREMITIES: no dependent edema  Further exam deferred to limit COVID exposure    LABS:                        7.9    12.93 )-----------( 271      ( 06 Jan 2024 06:04 )             26.8     Complete Blood Count + Automated Diff in AM (01.07.24 @ 07:26)   WBC Count: 8.75 K/uL  RBC Count: 2.82 M/uL  Hemoglobin: 7.9 g/dL  Hematocrit: 24.9 %  Mean Cell Volume: 88.3 fl  Mean Cell Hemoglobin: 28.0 pg  Mean Cell Hemoglobin Conc: 31.7 gm/dL  Red Cell Distrib Width: 16.0 %  Platelet Count - Automated: 216 K/uL  Auto Neutrophil #: 4.76 K/uL  Auto Lymphocyte #: 3.07 K/uL  Auto Monocyte #: 0.31 K/uL  Auto Eosinophil #: 0.00 K/uL  Auto Basophil #: 0.23 K/uL  Auto Neutrophil %: 54.4: Differential percentages must be correlated with absolute numbers for   clinical significance. %  Auto Lymphocyte %: 35.1 %  Auto Monocyte %: 3.5 %  Auto Eosinophil %: 0.0 %  Auto Basophil %: 2.6 %01-06    141  |  105  |  34.5<H>  ----------------------------<  111<H>  4.7   |  24.0  |  4.35<H>    Ca    8.4      06 Jan 2024 06:04      Comprehensive Metabolic Panel in AM (01.07.24 @ 07:26)   Sodium: 145 mmol/L  Potassium: 3.5 mmol/L  Chloride: 106: Chloride reference range changed from ..10/26/2022 mmol/L  Carbon Dioxide: 27.0 mmol/L  Anion Gap: 12 mmol/L  Blood Urea Nitrogen: 33.8 mg/dL  Creatinine: 4.35 mg/dL  Glucose: 84 mg/dL  Calcium: 8.1 mg/dL  Protein Total: 5.5 g/dL  Albumin: 3.1 g/dL  Bilirubin Total: <0.2 mg/dL  Alkaline Phosphatase: 43 U/L  Aspartate Aminotransferase (AST/SGOT): 87 U/L  Alanine Aminotransferase (ALT/SGPT): 147 U/L  Urinalysis Basic - ( 06 Jan 2024 06:04 )    Color: x / Appearance: x / SG: x / pH: x  Gluc: 111 mg/dL / Ketone: x  / Bili: x / Urobili: x   Blood: x / Protein: x / Nitrite: x   Leuk Esterase: x / RBC: x / WBC x   Sq Epi: x / Non Sq Epi: x / Bacteria: x          RADIOLOGY & ADDITIONAL TESTS:  < from: US Kidney and Bladder (01.01.24 @ 08:46) >    ACC: [id number] EXAM:  US KIDNEYS AND BLADDER   ORDERED BY: LORRAINE KIM     PROCEDURE DATE:  01/01/2024          INTERPRETATION:  CLINICAL INFORMATION: Worsening renal failure.    COMPARISON: None available.    TECHNIQUE: Sonography of thekidneys and bladder.    FINDINGS:  Right kidney: 6.0 cm. Atrophic. No hydronephrosis or calculi. Upper pole   cyst measuring 0.8 x 0.8 x 0.8 cm. Midpole isoechoic mass versus   prominent cortical lobulation measuring 2.6 x 2.6 x 2.6 cm.    Left kidney: 7.2 cm. Atrophic. No hydronephrosis or calculi. Upper pole   cyst measuring 0.9 x 0.8 x 0.7 cm.    Urinary bladder: Bilateral urinary jets visualized.    IMPRESSION:  Atrophic kidneys. No hydronephrosis.    Right renal mass versus prominent cortical lobulation. Recommend renal   protocol CT or MRI for further evaluation when clinically feasible.    < end of copied text >

## 2024-01-07 NOTE — PROGRESS NOTE ADULT - ASSESSMENT
24 y/o female whose real name is Theresa Tsang. brought from [place] for evaluation. Patient was apparently in her normal state of health at a [employer] event and shortly after eating rice/beans and meat started c/o abdominal pain and cramping and then vomited. Friends who witnessed it said they called EMS due to patient being very agitated. She has a hx of "kidney problems" and used to be on medications for it but self discontinued " a while ago."Labs with Hbg of 8.6 and Cr. of 3.95 with reported baseline of 2.5. No reported mental health history. Patient now at mental baseline however hypotension in setting of possible AI? Sinus bradycardia and awaiting MR abdomen for renal lesions due to poor outpatient follow up.     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 Pain related?   -CTH neg  -Utox positive for benzo likely given en route to ER  -Back to baseline now   -Monitor     Anemia  -Likely on basis of CKD  -Low iron stores, started on Venofer   -FOBT negative     COVID-19/Leukocytosis  -No hypoxia  -Supportive Care  -DC zosyn, s/p 3 days  -Bcx negative to date  -Increased wbc likely due to steroids     Hypocalcemia  -Replete      DVT Prophylaxis -- Venodyne  Dispo: Home once stable.    22 y/o female whose real name is Theresa Tsang. brought from [place] for evaluation. Patient was apparently in her normal state of health at a [employer] event and shortly after eating rice/beans and meat started c/o abdominal pain and cramping and then vomited. Friends who witnessed it said they called EMS due to patient being very agitated. She has a hx of "kidney problems" and used to be on medications for it but self discontinued " a while ago."Labs with Hbg of 8.6 and Cr. of 3.95 with reported baseline of 2.5. No reported mental health history. Patient now at mental baseline however hypotension in setting of possible AI? Sinus bradycardia and awaiting MR abdomen for renal lesions due to poor outpatient follow up.     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 Pain related?   -CTH neg  -Utox positive for benzo likely given en route to ER  -Back to baseline now   -Monitor     Anemia  -Likely on basis of CKD  -Low iron stores, started on Venofer   -FOBT negative     COVID-19/Leukocytosis  -No hypoxia  -Supportive Care  -DC zosyn, s/p 3 days  -Bcx negative to date  -Increased wbc likely due to steroids     Hypocalcemia  -Replete      DVT Prophylaxis -- Venodyne  Dispo: Home once stable.

## 2024-01-07 NOTE — PROGRESS NOTE ADULT - ASSESSMENT
CKD(III/IV): reported baseline of 2.5  +COVID  Progressive renal decline (?) COVID nephrotoxicity (?) ATN due to hypotension  M.A.  CT scan: Atrophic kidneys with bilateral indeterminate renal masses  - cont avoid potential nephrotoxins  - cont NaHCO3  - still awaiting MRI abdomen to better evaluate renal masses  - if current trend continues may need to consider initiation of HD this admission    Hypotension: r/o adrenal insufficiency  - Hydrocortisone, midodrine  - Endocrinology f/u noted    RO: iPTH 73  - cont CaCO3 and Rocaltrol  - trend labs    Anemia: +CKD + low Fe  - cont MARY and IV Fe  - target Hgb > 10.0  - attempt to avoid PRBCs (future transplant candidate)

## 2024-01-07 NOTE — PROGRESS NOTE ADULT - SUBJECTIVE AND OBJECTIVE BOX
SUBJECTIVE / OVERNIGHT EVENTS: Seen and examined. Used  ID [id number]. Endorses abdominal pain has resolved. still awaiting MR abdomen. Denies chest pain, SOB, abd pain, N/V, fever, chills, dysuria or any other complaints. All remainder ROS negative.     MEDICATIONS  (STANDING):  acetaminophen   IVPB .. 1000 milliGRAM(s) IV Intermittent once  calcitriol   Capsule 0.25 MICROGram(s) Oral daily  calcium carbonate    500 mG (Tums) Chewable 1 Tablet(s) Chew two times a day  epoetin dev (PROCRIT) Injectable 51217 Unit(s) SubCutaneous every 7 days  fludroCORTISONE 0.1 milliGRAM(s) Oral daily  folic acid 1 milliGRAM(s) Oral daily  levothyroxine 25 MICROGram(s) Oral daily  midodrine. 10 milliGRAM(s) Oral once  multivitamin 1 Tablet(s) Oral daily  pantoprazole    Tablet 40 milliGRAM(s) Oral before breakfast  sodium bicarbonate 1300 milliGRAM(s) Oral three times a day  sodium chloride 0.9% lock flush 3 milliLiter(s) IV Push every 8 hours    MEDICATIONS  (PRN):  acetaminophen     Tablet .. 650 milliGRAM(s) Oral every 6 hours PRN Temp greater or equal to 38C (100.4F), Mild Pain (1 - 3)  albuterol    90 MICROgram(s) HFA Inhaler 2 Puff(s) Inhalation every 6 hours PRN Shortness of Breath and/or Wheezing  aluminum hydroxide/magnesium hydroxide/simethicone Suspension 30 milliLiter(s) Oral every 4 hours PRN Dyspepsia  guaiFENesin Oral Liquid (Sugar-Free) 100 milliGRAM(s) Oral every 6 hours PRN Cough  LORazepam   Injectable 1 milliGRAM(s) IV Push every 8 hours PRN Agitation  melatonin 3 milliGRAM(s) Oral at bedtime PRN Insomnia  ondansetron Injectable 4 milliGRAM(s) IV Push every 8 hours PRN Nausea and/or Vomiting        I&O's Summary    07 Jan 2024 07:01  -  07 Jan 2024 11:50  --------------------------------------------------------  IN: 350 mL / OUT: 0 mL / NET: 350 mL        PHYSICAL EXAM:  Vital Signs Last 24 Hrs  T(C): 37 (07 Jan 2024 11:35), Max: 37.1 (07 Jan 2024 04:53)  T(F): 98.6 (07 Jan 2024 11:35), Max: 98.7 (07 Jan 2024 04:53)  HR: 76 (07 Jan 2024 11:35) (53 - 76)  BP: 113/75 (07 Jan 2024 11:35) (94/61 - 113/75)  BP(mean): --  RR: 18 (07 Jan 2024 11:35) (16 - 18)  SpO2: 96% (07 Jan 2024 11:35) (94% - 100%)    Parameters below as of 07 Jan 2024 08:57  Patient On (Oxygen Delivery Method): room air          General: Young female laying in bed comfortably. No acute distress  HEENT: EOMI. Clear conjunctivae. Moist mucus membrane  Neck: Supple.   Chest: CTA bilaterally. No wheezing, rales or rhonchi.   Heart: Normal S1 & S2. RRR.   Abdomen: Non distended. Soft. Non-tender. + BS  Ext: No pedal edema. No calf tenderness   Neuro: AAO x 3. No focal deficit. No speech disorder  Skin: Warm and Dry  Psychiatry: Normal mood and affect    LABS:                        7.9    8.75  )-----------( 216      ( 07 Jan 2024 07:26 )             24.9     01-07    145  |  106  |  33.8<H>  ----------------------------<  84  3.5   |  27.0  |  4.35<H>    Ca    8.1<L>      07 Jan 2024 07:26  Phos  2.8     01-07    TPro  5.5<L>  /  Alb  3.1<L>  /  TBili  <0.2<L>  /  DBili  x   /  AST  87<H>  /  ALT  147<H>  /  AlkPhos  43  01-07          Urinalysis Basic - ( 07 Jan 2024 07:26 )    Color: x / Appearance: x / SG: x / pH: x  Gluc: 84 mg/dL / Ketone: x  / Bili: x / Urobili: x   Blood: x / Protein: x / Nitrite: x   Leuk Esterase: x / RBC: x / WBC x   Sq Epi: x / Non Sq Epi: x / Bacteria: x        CAPILLARY BLOOD GLUCOSE            RADIOLOGY & ADDITIONAL TESTS:  Results Reviewed:   Imaging Personally Reviewed:  Electrocardiogram Personally Reviewed:         SUBJECTIVE / OVERNIGHT EVENTS: Seen and examined. Used  ID [id number]. Endorses abdominal pain has resolved. still awaiting MR abdomen. Denies chest pain, SOB, abd pain, N/V, fever, chills, dysuria or any other complaints. All remainder ROS negative.     MEDICATIONS  (STANDING):  acetaminophen   IVPB .. 1000 milliGRAM(s) IV Intermittent once  calcitriol   Capsule 0.25 MICROGram(s) Oral daily  calcium carbonate    500 mG (Tums) Chewable 1 Tablet(s) Chew two times a day  epoetin dev (PROCRIT) Injectable 12704 Unit(s) SubCutaneous every 7 days  fludroCORTISONE 0.1 milliGRAM(s) Oral daily  folic acid 1 milliGRAM(s) Oral daily  levothyroxine 25 MICROGram(s) Oral daily  midodrine. 10 milliGRAM(s) Oral once  multivitamin 1 Tablet(s) Oral daily  pantoprazole    Tablet 40 milliGRAM(s) Oral before breakfast  sodium bicarbonate 1300 milliGRAM(s) Oral three times a day  sodium chloride 0.9% lock flush 3 milliLiter(s) IV Push every 8 hours    MEDICATIONS  (PRN):  acetaminophen     Tablet .. 650 milliGRAM(s) Oral every 6 hours PRN Temp greater or equal to 38C (100.4F), Mild Pain (1 - 3)  albuterol    90 MICROgram(s) HFA Inhaler 2 Puff(s) Inhalation every 6 hours PRN Shortness of Breath and/or Wheezing  aluminum hydroxide/magnesium hydroxide/simethicone Suspension 30 milliLiter(s) Oral every 4 hours PRN Dyspepsia  guaiFENesin Oral Liquid (Sugar-Free) 100 milliGRAM(s) Oral every 6 hours PRN Cough  LORazepam   Injectable 1 milliGRAM(s) IV Push every 8 hours PRN Agitation  melatonin 3 milliGRAM(s) Oral at bedtime PRN Insomnia  ondansetron Injectable 4 milliGRAM(s) IV Push every 8 hours PRN Nausea and/or Vomiting        I&O's Summary    07 Jan 2024 07:01  -  07 Jan 2024 11:50  --------------------------------------------------------  IN: 350 mL / OUT: 0 mL / NET: 350 mL        PHYSICAL EXAM:  Vital Signs Last 24 Hrs  T(C): 37 (07 Jan 2024 11:35), Max: 37.1 (07 Jan 2024 04:53)  T(F): 98.6 (07 Jan 2024 11:35), Max: 98.7 (07 Jan 2024 04:53)  HR: 76 (07 Jan 2024 11:35) (53 - 76)  BP: 113/75 (07 Jan 2024 11:35) (94/61 - 113/75)  BP(mean): --  RR: 18 (07 Jan 2024 11:35) (16 - 18)  SpO2: 96% (07 Jan 2024 11:35) (94% - 100%)    Parameters below as of 07 Jan 2024 08:57  Patient On (Oxygen Delivery Method): room air          General: Young female laying in bed comfortably. No acute distress  HEENT: EOMI. Clear conjunctivae. Moist mucus membrane  Neck: Supple.   Chest: CTA bilaterally. No wheezing, rales or rhonchi.   Heart: Normal S1 & S2. RRR.   Abdomen: Non distended. Soft. Non-tender. + BS  Ext: No pedal edema. No calf tenderness   Neuro: AAO x 3. No focal deficit. No speech disorder  Skin: Warm and Dry  Psychiatry: Normal mood and affect    LABS:                        7.9    8.75  )-----------( 216      ( 07 Jan 2024 07:26 )             24.9     01-07    145  |  106  |  33.8<H>  ----------------------------<  84  3.5   |  27.0  |  4.35<H>    Ca    8.1<L>      07 Jan 2024 07:26  Phos  2.8     01-07    TPro  5.5<L>  /  Alb  3.1<L>  /  TBili  <0.2<L>  /  DBili  x   /  AST  87<H>  /  ALT  147<H>  /  AlkPhos  43  01-07          Urinalysis Basic - ( 07 Jan 2024 07:26 )    Color: x / Appearance: x / SG: x / pH: x  Gluc: 84 mg/dL / Ketone: x  / Bili: x / Urobili: x   Blood: x / Protein: x / Nitrite: x   Leuk Esterase: x / RBC: x / WBC x   Sq Epi: x / Non Sq Epi: x / Bacteria: x        CAPILLARY BLOOD GLUCOSE            RADIOLOGY & ADDITIONAL TESTS:  Results Reviewed:   Imaging Personally Reviewed:  Electrocardiogram Personally Reviewed:

## 2024-01-08 LAB
ALBUMIN SERPL ELPH-MCNC: 3.2 G/DL — LOW (ref 3.3–5.2)
ALBUMIN SERPL ELPH-MCNC: 3.2 G/DL — LOW (ref 3.3–5.2)
ALP SERPL-CCNC: 52 U/L — SIGNIFICANT CHANGE UP (ref 40–120)
ALP SERPL-CCNC: 52 U/L — SIGNIFICANT CHANGE UP (ref 40–120)
ALT FLD-CCNC: 116 U/L — HIGH
ALT FLD-CCNC: 116 U/L — HIGH
ANION GAP SERPL CALC-SCNC: 13 MMOL/L — SIGNIFICANT CHANGE UP (ref 5–17)
ANION GAP SERPL CALC-SCNC: 13 MMOL/L — SIGNIFICANT CHANGE UP (ref 5–17)
ANION GAP SERPL CALC-SCNC: 14 MMOL/L — SIGNIFICANT CHANGE UP (ref 5–17)
ANION GAP SERPL CALC-SCNC: 14 MMOL/L — SIGNIFICANT CHANGE UP (ref 5–17)
AST SERPL-CCNC: 45 U/L — HIGH
AST SERPL-CCNC: 45 U/L — HIGH
BASOPHILS # BLD AUTO: 0.04 K/UL — SIGNIFICANT CHANGE UP (ref 0–0.2)
BASOPHILS # BLD AUTO: 0.04 K/UL — SIGNIFICANT CHANGE UP (ref 0–0.2)
BASOPHILS NFR BLD AUTO: 0.4 % — SIGNIFICANT CHANGE UP (ref 0–2)
BASOPHILS NFR BLD AUTO: 0.4 % — SIGNIFICANT CHANGE UP (ref 0–2)
BILIRUB SERPL-MCNC: 0.2 MG/DL — LOW (ref 0.4–2)
BILIRUB SERPL-MCNC: 0.2 MG/DL — LOW (ref 0.4–2)
BUN SERPL-MCNC: 26.9 MG/DL — HIGH (ref 8–20)
BUN SERPL-MCNC: 26.9 MG/DL — HIGH (ref 8–20)
BUN SERPL-MCNC: 27.5 MG/DL — HIGH (ref 8–20)
BUN SERPL-MCNC: 27.5 MG/DL — HIGH (ref 8–20)
CALCIUM SERPL-MCNC: 8.3 MG/DL — LOW (ref 8.4–10.5)
CALCIUM SERPL-MCNC: 8.3 MG/DL — LOW (ref 8.4–10.5)
CALCIUM SERPL-MCNC: 9.2 MG/DL — SIGNIFICANT CHANGE UP (ref 8.4–10.5)
CALCIUM SERPL-MCNC: 9.2 MG/DL — SIGNIFICANT CHANGE UP (ref 8.4–10.5)
CHLORIDE SERPL-SCNC: 104 MMOL/L — SIGNIFICANT CHANGE UP (ref 96–108)
CHLORIDE SERPL-SCNC: 104 MMOL/L — SIGNIFICANT CHANGE UP (ref 96–108)
CHLORIDE SERPL-SCNC: 105 MMOL/L — SIGNIFICANT CHANGE UP (ref 96–108)
CHLORIDE SERPL-SCNC: 105 MMOL/L — SIGNIFICANT CHANGE UP (ref 96–108)
CO2 SERPL-SCNC: 28 MMOL/L — SIGNIFICANT CHANGE UP (ref 22–29)
CORTIS AM PEAK SERPL-MCNC: 4 UG/DL — LOW (ref 6–18.4)
CORTIS AM PEAK SERPL-MCNC: 4 UG/DL — LOW (ref 6–18.4)
CREAT SERPL-MCNC: 3.55 MG/DL — HIGH (ref 0.5–1.3)
CREAT SERPL-MCNC: 3.55 MG/DL — HIGH (ref 0.5–1.3)
CREAT SERPL-MCNC: 3.95 MG/DL — HIGH (ref 0.5–1.3)
CREAT SERPL-MCNC: 3.95 MG/DL — HIGH (ref 0.5–1.3)
CULTURE RESULTS: SIGNIFICANT CHANGE UP
EGFR: 16 ML/MIN/1.73M2 — LOW
EGFR: 16 ML/MIN/1.73M2 — LOW
EGFR: 18 ML/MIN/1.73M2 — LOW
EGFR: 18 ML/MIN/1.73M2 — LOW
EOSINOPHIL # BLD AUTO: 0.25 K/UL — SIGNIFICANT CHANGE UP (ref 0–0.5)
EOSINOPHIL # BLD AUTO: 0.25 K/UL — SIGNIFICANT CHANGE UP (ref 0–0.5)
EOSINOPHIL NFR BLD AUTO: 2.4 % — SIGNIFICANT CHANGE UP (ref 0–6)
EOSINOPHIL NFR BLD AUTO: 2.4 % — SIGNIFICANT CHANGE UP (ref 0–6)
GLUCOSE SERPL-MCNC: 79 MG/DL — SIGNIFICANT CHANGE UP (ref 70–99)
GLUCOSE SERPL-MCNC: 79 MG/DL — SIGNIFICANT CHANGE UP (ref 70–99)
GLUCOSE SERPL-MCNC: 89 MG/DL — SIGNIFICANT CHANGE UP (ref 70–99)
GLUCOSE SERPL-MCNC: 89 MG/DL — SIGNIFICANT CHANGE UP (ref 70–99)
HCT VFR BLD CALC: 28 % — LOW (ref 34.5–45)
HCT VFR BLD CALC: 28 % — LOW (ref 34.5–45)
HGB BLD-MCNC: 8.3 G/DL — LOW (ref 11.5–15.5)
HGB BLD-MCNC: 8.3 G/DL — LOW (ref 11.5–15.5)
IMM GRANULOCYTES NFR BLD AUTO: 4 % — HIGH (ref 0–0.9)
IMM GRANULOCYTES NFR BLD AUTO: 4 % — HIGH (ref 0–0.9)
LYMPHOCYTES # BLD AUTO: 3.63 K/UL — HIGH (ref 1–3.3)
LYMPHOCYTES # BLD AUTO: 3.63 K/UL — HIGH (ref 1–3.3)
LYMPHOCYTES # BLD AUTO: 35.3 % — SIGNIFICANT CHANGE UP (ref 13–44)
LYMPHOCYTES # BLD AUTO: 35.3 % — SIGNIFICANT CHANGE UP (ref 13–44)
MAGNESIUM SERPL-MCNC: 1.9 MG/DL — SIGNIFICANT CHANGE UP (ref 1.6–2.6)
MAGNESIUM SERPL-MCNC: 1.9 MG/DL — SIGNIFICANT CHANGE UP (ref 1.6–2.6)
MCHC RBC-ENTMCNC: 27 PG — SIGNIFICANT CHANGE UP (ref 27–34)
MCHC RBC-ENTMCNC: 27 PG — SIGNIFICANT CHANGE UP (ref 27–34)
MCHC RBC-ENTMCNC: 29.6 GM/DL — LOW (ref 32–36)
MCHC RBC-ENTMCNC: 29.6 GM/DL — LOW (ref 32–36)
MCV RBC AUTO: 91.2 FL — SIGNIFICANT CHANGE UP (ref 80–100)
MCV RBC AUTO: 91.2 FL — SIGNIFICANT CHANGE UP (ref 80–100)
MONOCYTES # BLD AUTO: 0.84 K/UL — SIGNIFICANT CHANGE UP (ref 0–0.9)
MONOCYTES # BLD AUTO: 0.84 K/UL — SIGNIFICANT CHANGE UP (ref 0–0.9)
MONOCYTES NFR BLD AUTO: 8.2 % — SIGNIFICANT CHANGE UP (ref 2–14)
MONOCYTES NFR BLD AUTO: 8.2 % — SIGNIFICANT CHANGE UP (ref 2–14)
NEUTROPHILS # BLD AUTO: 5.12 K/UL — SIGNIFICANT CHANGE UP (ref 1.8–7.4)
NEUTROPHILS # BLD AUTO: 5.12 K/UL — SIGNIFICANT CHANGE UP (ref 1.8–7.4)
NEUTROPHILS NFR BLD AUTO: 49.7 % — SIGNIFICANT CHANGE UP (ref 43–77)
NEUTROPHILS NFR BLD AUTO: 49.7 % — SIGNIFICANT CHANGE UP (ref 43–77)
PHOSPHATE SERPL-MCNC: 2.9 MG/DL — SIGNIFICANT CHANGE UP (ref 2.4–4.7)
PHOSPHATE SERPL-MCNC: 2.9 MG/DL — SIGNIFICANT CHANGE UP (ref 2.4–4.7)
PLATELET # BLD AUTO: 235 K/UL — SIGNIFICANT CHANGE UP (ref 150–400)
PLATELET # BLD AUTO: 235 K/UL — SIGNIFICANT CHANGE UP (ref 150–400)
POTASSIUM SERPL-MCNC: 2.7 MMOL/L — CRITICAL LOW (ref 3.5–5.3)
POTASSIUM SERPL-MCNC: 2.7 MMOL/L — CRITICAL LOW (ref 3.5–5.3)
POTASSIUM SERPL-MCNC: 3.1 MMOL/L — LOW (ref 3.5–5.3)
POTASSIUM SERPL-MCNC: 3.1 MMOL/L — LOW (ref 3.5–5.3)
POTASSIUM SERPL-SCNC: 2.7 MMOL/L — CRITICAL LOW (ref 3.5–5.3)
POTASSIUM SERPL-SCNC: 2.7 MMOL/L — CRITICAL LOW (ref 3.5–5.3)
POTASSIUM SERPL-SCNC: 3.1 MMOL/L — LOW (ref 3.5–5.3)
POTASSIUM SERPL-SCNC: 3.1 MMOL/L — LOW (ref 3.5–5.3)
PROT SERPL-MCNC: 5.8 G/DL — LOW (ref 6.6–8.7)
PROT SERPL-MCNC: 5.8 G/DL — LOW (ref 6.6–8.7)
RBC # BLD: 3.07 M/UL — LOW (ref 3.8–5.2)
RBC # BLD: 3.07 M/UL — LOW (ref 3.8–5.2)
RBC # FLD: 15.9 % — HIGH (ref 10.3–14.5)
RBC # FLD: 15.9 % — HIGH (ref 10.3–14.5)
SODIUM SERPL-SCNC: 146 MMOL/L — HIGH (ref 135–145)
SPECIMEN SOURCE: SIGNIFICANT CHANGE UP
THYROPEROXIDASE AB SERPL-ACNC: <10 IU/ML — SIGNIFICANT CHANGE UP
THYROPEROXIDASE AB SERPL-ACNC: <10 IU/ML — SIGNIFICANT CHANGE UP
WBC # BLD: 10.29 K/UL — SIGNIFICANT CHANGE UP (ref 3.8–10.5)
WBC # BLD: 10.29 K/UL — SIGNIFICANT CHANGE UP (ref 3.8–10.5)
WBC # FLD AUTO: 10.29 K/UL — SIGNIFICANT CHANGE UP (ref 3.8–10.5)
WBC # FLD AUTO: 10.29 K/UL — SIGNIFICANT CHANGE UP (ref 3.8–10.5)

## 2024-01-08 PROCEDURE — 99233 SBSQ HOSP IP/OBS HIGH 50: CPT

## 2024-01-08 RX ORDER — POTASSIUM CHLORIDE 20 MEQ
40 PACKET (EA) ORAL ONCE
Refills: 0 | Status: COMPLETED | OUTPATIENT
Start: 2024-01-08 | End: 2024-01-08

## 2024-01-08 RX ORDER — POTASSIUM CHLORIDE 20 MEQ
20 PACKET (EA) ORAL
Refills: 0 | Status: COMPLETED | OUTPATIENT
Start: 2024-01-08 | End: 2024-01-08

## 2024-01-08 RX ORDER — COSYNTROPIN 0.25 MG/ML
0.25 INJECTION, SOLUTION INTRAVENOUS ONCE
Refills: 0 | Status: COMPLETED | OUTPATIENT
Start: 2024-01-08 | End: 2024-01-08

## 2024-01-08 RX ORDER — SODIUM BICARBONATE 1 MEQ/ML
650 SYRINGE (ML) INTRAVENOUS THREE TIMES A DAY
Refills: 0 | Status: DISCONTINUED | OUTPATIENT
Start: 2024-01-08 | End: 2024-01-12

## 2024-01-08 RX ADMIN — Medication 20 MILLIEQUIVALENT(S): at 11:34

## 2024-01-08 RX ADMIN — Medication 650 MILLIGRAM(S): at 22:36

## 2024-01-08 RX ADMIN — CALCITRIOL 0.25 MICROGRAM(S): 0.5 CAPSULE ORAL at 11:34

## 2024-01-08 RX ADMIN — Medication 1300 MILLIGRAM(S): at 05:51

## 2024-01-08 RX ADMIN — Medication 1 MILLIGRAM(S): at 11:34

## 2024-01-08 RX ADMIN — Medication 40 MILLIEQUIVALENT(S): at 22:36

## 2024-01-08 RX ADMIN — SODIUM CHLORIDE 3 MILLILITER(S): 9 INJECTION INTRAMUSCULAR; INTRAVENOUS; SUBCUTANEOUS at 05:58

## 2024-01-08 RX ADMIN — Medication 20 MILLIEQUIVALENT(S): at 09:50

## 2024-01-08 RX ADMIN — PANTOPRAZOLE SODIUM 40 MILLIGRAM(S): 20 TABLET, DELAYED RELEASE ORAL at 05:50

## 2024-01-08 RX ADMIN — Medication 50 MILLIEQUIVALENT(S): at 08:32

## 2024-01-08 RX ADMIN — Medication 1 TABLET(S): at 17:15

## 2024-01-08 RX ADMIN — Medication 650 MILLIGRAM(S): at 13:11

## 2024-01-08 RX ADMIN — Medication 25 MICROGRAM(S): at 05:51

## 2024-01-08 RX ADMIN — Medication 50 MILLIEQUIVALENT(S): at 07:40

## 2024-01-08 RX ADMIN — COSYNTROPIN 0.25 MILLIGRAM(S): 0.25 INJECTION, SOLUTION INTRAVENOUS at 15:00

## 2024-01-08 RX ADMIN — SODIUM CHLORIDE 3 MILLILITER(S): 9 INJECTION INTRAMUSCULAR; INTRAVENOUS; SUBCUTANEOUS at 13:08

## 2024-01-08 RX ADMIN — Medication 1 TABLET(S): at 11:34

## 2024-01-08 RX ADMIN — SODIUM CHLORIDE 3 MILLILITER(S): 9 INJECTION INTRAMUSCULAR; INTRAVENOUS; SUBCUTANEOUS at 22:08

## 2024-01-08 RX ADMIN — Medication 1 TABLET(S): at 05:50

## 2024-01-08 RX ADMIN — FLUDROCORTISONE ACETATE 0.1 MILLIGRAM(S): 0.1 TABLET ORAL at 05:50

## 2024-01-08 RX ADMIN — Medication 20 MILLIEQUIVALENT(S): at 09:04

## 2024-01-08 NOTE — PROGRESS NOTE ADULT - ASSESSMENT
23F Montenegrin speaking, w/ hypothyroidism and ckd brought in for AMS at a [employer] event. Patient was apparently in her normal state of health at a [employer] event and shortly after eating rice/beans and meat started c/o abdominal pain and cramping and then vomited. Friends who witnessed it said they called EMS due to patient being very agitated. No hx of drug use. She has a hx of "kidney problems" and used to be on medications for it but self discontinued " a while ago." In the field Patient given Versed and then Ativan in ER to allow for exam and labs. Labs with Hbg of 8.6 and Cr. of 3.95 with reported baseline of 2.5. No reported mental health history. Admitted for acute metabolic encephalopathy with MARIVEL on CKD. Found to have COVID  Course complicated by high grade fever, hypotension. Consult for adrenal insufficiency    1. Hypotension- BP soft but stable  - Initial AM cortisol not accurate because pt was already on Hydrocortisone  - Hydrocortisone last dose was 1/6 am  - Cortisol from today is low, 4  - Will do ACTH stimulation test to rule out adrenal insuffciency    2. Hypothyroidism  - TSH 3.55, FT4 1.1  - Confirmed home dosing with [facility] ([phone]) LT4 25 mcg, last picked up 11/20 (last dose would have been 12/20)  - Will resume home dose of LT4 25 mcg  - Thyroid ultrasound is normal  - TPO ab pending 23F Egyptian speaking, w/ hypothyroidism and ckd brought in for AMS at a [employer] event. Patient was apparently in her normal state of health at a [employer] event and shortly after eating rice/beans and meat started c/o abdominal pain and cramping and then vomited. Friends who witnessed it said they called EMS due to patient being very agitated. No hx of drug use. She has a hx of "kidney problems" and used to be on medications for it but self discontinued " a while ago." In the field Patient given Versed and then Ativan in ER to allow for exam and labs. Labs with Hbg of 8.6 and Cr. of 3.95 with reported baseline of 2.5. No reported mental health history. Admitted for acute metabolic encephalopathy with MARIVEL on CKD. Found to have COVID  Course complicated by high grade fever, hypotension. Consult for adrenal insufficiency    1. Hypotension- BP soft but stable  - Initial AM cortisol not accurate because pt was already on Hydrocortisone  - Hydrocortisone last dose was 1/6 am  - Cortisol from today is low, 4  - Will do ACTH stimulation test to rule out adrenal insuffciency    2. Hypothyroidism  - TSH 3.55, FT4 1.1  - Confirmed home dosing with [facility] ([phone]) LT4 25 mcg, last picked up 11/20 (last dose would have been 12/20)  - Will resume home dose of LT4 25 mcg  - Thyroid ultrasound is normal  - TPO ab pending

## 2024-01-08 NOTE — PROGRESS NOTE ADULT - ASSESSMENT
CKD(III/IV): reported baseline of 2.5  +COVID  MARIVEL (?) COVID nephrotoxicity (?) ATN due to hypotension---> signs of renal recovery noted today  M.A. improved  Hypokalemia  CT scan: Atrophic kidneys with bilateral indeterminate renal masses  - cont to avoid potential nephrotoxins  - adjust NaHCO3 dose  - still awaiting MRI abdomen  - supplement K+  - follow labs    Hypotension: r/o adrenal insufficiency  - Hydrocortisone, midodrine  - Endocrinology f/u noted    RO: iPTH 73  - cont CaCO3 and Rocaltrol  - trend labs    Anemia: +CKD + low Fe  - cont MARY and IV Fe  - target Hgb > 10.0  - attempt to avoid PRBCs (future transplant candidate)

## 2024-01-08 NOTE — PROGRESS NOTE ADULT - SUBJECTIVE AND OBJECTIVE BOX
INTERVAL EVENTS:  Follow up diabetes management   used for translation    ROS: Denies pain at time of exam    MEDICATIONS  (STANDING):  acetaminophen   IVPB .. 1000 milliGRAM(s) IV Intermittent once  calcitriol   Capsule 0.25 MICROGram(s) Oral daily  calcium carbonate    500 mG (Tums) Chewable 1 Tablet(s) Chew two times a day  cosyntropin Injectable 0.25 milliGRAM(s) IV Push once  epoetin dev (PROCRIT) Injectable 09288 Unit(s) SubCutaneous every 7 days  fludroCORTISONE 0.1 milliGRAM(s) Oral daily  folic acid 1 milliGRAM(s) Oral daily  levothyroxine 25 MICROGram(s) Oral daily  midodrine. 10 milliGRAM(s) Oral once  multivitamin 1 Tablet(s) Oral daily  pantoprazole    Tablet 40 milliGRAM(s) Oral before breakfast  sodium bicarbonate 650 milliGRAM(s) Oral three times a day  sodium chloride 0.9% lock flush 3 milliLiter(s) IV Push every 8 hours    MEDICATIONS  (PRN):  acetaminophen     Tablet .. 650 milliGRAM(s) Oral every 6 hours PRN Temp greater or equal to 38C (100.4F), Mild Pain (1 - 3)  albuterol    90 MICROgram(s) HFA Inhaler 2 Puff(s) Inhalation every 6 hours PRN Shortness of Breath and/or Wheezing  aluminum hydroxide/magnesium hydroxide/simethicone Suspension 30 milliLiter(s) Oral every 4 hours PRN Dyspepsia  guaiFENesin Oral Liquid (Sugar-Free) 100 milliGRAM(s) Oral every 6 hours PRN Cough  LORazepam   Injectable 1 milliGRAM(s) IV Push every 8 hours PRN Agitation  melatonin 3 milliGRAM(s) Oral at bedtime PRN Insomnia  ondansetron Injectable 4 milliGRAM(s) IV Push every 8 hours PRN Nausea and/or Vomiting    Allergies  No Known Allergies    Vital Signs Last 24 Hrs  T(C): 36.9 (08 Jan 2024 04:00), Max: 36.9 (08 Jan 2024 04:00)  T(F): 98.5 (08 Jan 2024 04:00), Max: 98.5 (08 Jan 2024 04:00)  HR: 65 (08 Jan 2024 04:00) (64 - 93)  BP: 100/60 (08 Jan 2024 04:00) (100/60 - 103/66)  BP(mean): --  RR: 18 (08 Jan 2024 04:00) (18 - 18)  SpO2: 99% (08 Jan 2024 04:00) (95% - 100%)    Parameters below as of 08 Jan 2024 04:00  Patient On (Oxygen Delivery Method): room air    PHYSICAL EXAM:  General: No apparent distress  Neck: Supple, trachea midline, no thyromegaly  Respiratory: Lungs clear bilaterally, normal rate, effort  Cardiac: +S1, S2, no m/r/g  GI: +BS, soft, non tender, non distended  Extremities: No peripheral edema, no pedal lesions  Neuro: A+O X3, no tremor    LABS:                        8.3    10.29 )-----------( 235      ( 08 Jan 2024 05:12 )             28.0     01-08    146<H>  |  105  |  27.5<H>  ----------------------------<  89  2.7<LL>   |  28.0  |  3.95<H>    Ca    8.3<L>      08 Jan 2024 05:12  Phos  2.9     01-08  Mg     1.9     01-08    TPro  5.8<L>  /  Alb  3.2<L>  /  TBili  0.2<L>  /  DBili  x   /  AST  45<H>  /  ALT  116<H>  /  AlkPhos  52  01-08    Urinalysis Basic - ( 08 Jan 2024 05:12 )    Color: x / Appearance: x / SG: x / pH: x  Gluc: 89 mg/dL / Ketone: x  / Bili: x / Urobili: x   Blood: x / Protein: x / Nitrite: x   Leuk Esterase: x / RBC: x / WBC x   Sq Epi: x / Non Sq Epi: x / Bacteria: x    Free Thyroxine, Serum: 1.1 ng/dL (01-05-24 @ 06:16)  Thyroid Stimulating Hormone, Serum: 3.55 uIU/mL (01-05-24 @ 06:16)  Thyroid Stimulating Hormone, Serum: 1.99 uIU/mL (01-03-24 @ 06:42)   INTERVAL EVENTS:  Follow up diabetes management   used for translation    ROS: Denies pain at time of exam    MEDICATIONS  (STANDING):  acetaminophen   IVPB .. 1000 milliGRAM(s) IV Intermittent once  calcitriol   Capsule 0.25 MICROGram(s) Oral daily  calcium carbonate    500 mG (Tums) Chewable 1 Tablet(s) Chew two times a day  cosyntropin Injectable 0.25 milliGRAM(s) IV Push once  epoetin dev (PROCRIT) Injectable 30961 Unit(s) SubCutaneous every 7 days  fludroCORTISONE 0.1 milliGRAM(s) Oral daily  folic acid 1 milliGRAM(s) Oral daily  levothyroxine 25 MICROGram(s) Oral daily  midodrine. 10 milliGRAM(s) Oral once  multivitamin 1 Tablet(s) Oral daily  pantoprazole    Tablet 40 milliGRAM(s) Oral before breakfast  sodium bicarbonate 650 milliGRAM(s) Oral three times a day  sodium chloride 0.9% lock flush 3 milliLiter(s) IV Push every 8 hours    MEDICATIONS  (PRN):  acetaminophen     Tablet .. 650 milliGRAM(s) Oral every 6 hours PRN Temp greater or equal to 38C (100.4F), Mild Pain (1 - 3)  albuterol    90 MICROgram(s) HFA Inhaler 2 Puff(s) Inhalation every 6 hours PRN Shortness of Breath and/or Wheezing  aluminum hydroxide/magnesium hydroxide/simethicone Suspension 30 milliLiter(s) Oral every 4 hours PRN Dyspepsia  guaiFENesin Oral Liquid (Sugar-Free) 100 milliGRAM(s) Oral every 6 hours PRN Cough  LORazepam   Injectable 1 milliGRAM(s) IV Push every 8 hours PRN Agitation  melatonin 3 milliGRAM(s) Oral at bedtime PRN Insomnia  ondansetron Injectable 4 milliGRAM(s) IV Push every 8 hours PRN Nausea and/or Vomiting    Allergies  No Known Allergies    Vital Signs Last 24 Hrs  T(C): 36.9 (08 Jan 2024 04:00), Max: 36.9 (08 Jan 2024 04:00)  T(F): 98.5 (08 Jan 2024 04:00), Max: 98.5 (08 Jan 2024 04:00)  HR: 65 (08 Jan 2024 04:00) (64 - 93)  BP: 100/60 (08 Jan 2024 04:00) (100/60 - 103/66)  BP(mean): --  RR: 18 (08 Jan 2024 04:00) (18 - 18)  SpO2: 99% (08 Jan 2024 04:00) (95% - 100%)    Parameters below as of 08 Jan 2024 04:00  Patient On (Oxygen Delivery Method): room air    PHYSICAL EXAM:  General: No apparent distress  Neck: Supple, trachea midline, no thyromegaly  Respiratory: Lungs clear bilaterally, normal rate, effort  Cardiac: +S1, S2, no m/r/g  GI: +BS, soft, non tender, non distended  Extremities: No peripheral edema, no pedal lesions  Neuro: A+O X3, no tremor    LABS:                        8.3    10.29 )-----------( 235      ( 08 Jan 2024 05:12 )             28.0     01-08    146<H>  |  105  |  27.5<H>  ----------------------------<  89  2.7<LL>   |  28.0  |  3.95<H>    Ca    8.3<L>      08 Jan 2024 05:12  Phos  2.9     01-08  Mg     1.9     01-08    TPro  5.8<L>  /  Alb  3.2<L>  /  TBili  0.2<L>  /  DBili  x   /  AST  45<H>  /  ALT  116<H>  /  AlkPhos  52  01-08    Urinalysis Basic - ( 08 Jan 2024 05:12 )    Color: x / Appearance: x / SG: x / pH: x  Gluc: 89 mg/dL / Ketone: x  / Bili: x / Urobili: x   Blood: x / Protein: x / Nitrite: x   Leuk Esterase: x / RBC: x / WBC x   Sq Epi: x / Non Sq Epi: x / Bacteria: x    Free Thyroxine, Serum: 1.1 ng/dL (01-05-24 @ 06:16)  Thyroid Stimulating Hormone, Serum: 3.55 uIU/mL (01-05-24 @ 06:16)  Thyroid Stimulating Hormone, Serum: 1.99 uIU/mL (01-03-24 @ 06:42)

## 2024-01-08 NOTE — PROGRESS NOTE ADULT - NS ATTEND AMEND GEN_ALL_CORE FT
I agree with plan above and discussed with NP.  cortisol am   will do stim test to rule out AI and push HC 50 TID . Not sure whey would she have AI ???   if confirmed she will need followup with endo as outpatient. Will check 21 hydroxylase Ab I agree with plan above and discussed with NP.  cortisol am   will do stim test to rule out AI  and based on the results will determine if patient requires steroids replacement.    if confirmed she will need followup with endo as outpatient. Will check 21 hydroxylase Ab

## 2024-01-08 NOTE — PROGRESS NOTE ADULT - ASSESSMENT
24 y/o female whose real name is Theresa Tsagn. brought from [employer] for evaluation. Patient was apparently in her normal state of health at a [employer] event and shortly after eating rice/beans and meat started c/o abdominal pain and cramping and then vomited. Friends who witnessed it said they called EMS due to patient being very agitated. She has a hx of "kidney problems" and used to be on medications for it but self discontinued " a while ago."Labs with Hbg of 8.6 and Cr. of 3.95 with reported baseline of 2.5. No reported mental health history. Patient now at mental baseline however hypotension in setting of possible AI? Sinus bradycardia and awaiting MR abdomen for renal lesions due to poor outpatient follow up.     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 Pain related?   -CTH neg  -Utox positive for benzo likely given en route to ER  -Back to baseline now   -Monitor     Anemia  -Likely on basis of CKD  -Low iron stores, started on Venofer   -FOBT negative     COVID-19/Leukocytosis  -No hypoxia  -Supportive Care  -DC zosyn, s/p 3 days  -Bcx negative to date  -Increased wbc likely due to steroids     Hypocalcemia  Hypokalemia  -Replete and monitor      DVT Prophylaxis -- Venodyne  Dispo: Home once stable.    24 y/o female whose real name is Theresa Tsang. brought from [employer] for evaluation. Patient was apparently in her normal state of health at a [employer] event and shortly after eating rice/beans and meat started c/o abdominal pain and cramping and then vomited. Friends who witnessed it said they called EMS due to patient being very agitated. She has a hx of "kidney problems" and used to be on medications for it but self discontinued " a while ago."Labs with Hbg of 8.6 and Cr. of 3.95 with reported baseline of 2.5. No reported mental health history. Patient now at mental baseline however hypotension in setting of possible AI? Sinus bradycardia and awaiting MR abdomen for renal lesions due to poor outpatient follow up.     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 Pain related?   -CTH neg  -Utox positive for benzo likely given en route to ER  -Back to baseline now   -Monitor     Anemia  -Likely on basis of CKD  -Low iron stores, started on Venofer   -FOBT negative     COVID-19/Leukocytosis  -No hypoxia  -Supportive Care  -DC zosyn, s/p 3 days  -Bcx negative to date  -Increased wbc likely due to steroids     Hypocalcemia  Hypokalemia  -Replete and monitor      DVT Prophylaxis -- Venodyne  Dispo: Home once stable.

## 2024-01-08 NOTE — PROGRESS NOTE ADULT - SUBJECTIVE AND OBJECTIVE BOX
SUBJECTIVE / OVERNIGHT EVENTS: Seen and examined. Used  Delvin. She is emotional as she was given IV K due to low K. Denies chest pain, SOB, abd pain, N/V, fever, chills, dysuria or any other complaints. All remainder ROS negative.  still awaiting MR jesenia Davila improved.    MEDICATIONS  (STANDING):  acetaminophen   IVPB .. 1000 milliGRAM(s) IV Intermittent once  calcitriol   Capsule 0.25 MICROGram(s) Oral daily  calcium carbonate    500 mG (Tums) Chewable 1 Tablet(s) Chew two times a day  epoetin dev (PROCRIT) Injectable 86056 Unit(s) SubCutaneous every 7 days  fludroCORTISONE 0.1 milliGRAM(s) Oral daily  folic acid 1 milliGRAM(s) Oral daily  levothyroxine 25 MICROGram(s) Oral daily  midodrine. 10 milliGRAM(s) Oral once  multivitamin 1 Tablet(s) Oral daily  pantoprazole    Tablet 40 milliGRAM(s) Oral before breakfast  potassium chloride    Tablet ER 20 milliEquivalent(s) Oral every 2 hours  sodium bicarbonate 1300 milliGRAM(s) Oral three times a day  sodium chloride 0.9% lock flush 3 milliLiter(s) IV Push every 8 hours    MEDICATIONS  (PRN):  acetaminophen     Tablet .. 650 milliGRAM(s) Oral every 6 hours PRN Temp greater or equal to 38C (100.4F), Mild Pain (1 - 3)  albuterol    90 MICROgram(s) HFA Inhaler 2 Puff(s) Inhalation every 6 hours PRN Shortness of Breath and/or Wheezing  aluminum hydroxide/magnesium hydroxide/simethicone Suspension 30 milliLiter(s) Oral every 4 hours PRN Dyspepsia  guaiFENesin Oral Liquid (Sugar-Free) 100 milliGRAM(s) Oral every 6 hours PRN Cough  LORazepam   Injectable 1 milliGRAM(s) IV Push every 8 hours PRN Agitation  melatonin 3 milliGRAM(s) Oral at bedtime PRN Insomnia  ondansetron Injectable 4 milliGRAM(s) IV Push every 8 hours PRN Nausea and/or Vomiting        I&O's Summary    07 Jan 2024 07:01  -  08 Jan 2024 07:00  --------------------------------------------------------  IN: 1200 mL / OUT: 0 mL / NET: 1200 mL        PHYSICAL EXAM:  Vital Signs Last 24 Hrs  T(C): 36.9 (08 Jan 2024 04:00), Max: 37 (07 Jan 2024 11:35)  T(F): 98.5 (08 Jan 2024 04:00), Max: 98.6 (07 Jan 2024 11:35)  HR: 65 (08 Jan 2024 04:00) (64 - 93)  BP: 100/60 (08 Jan 2024 04:00) (100/60 - 113/75)  BP(mean): --  RR: 18 (08 Jan 2024 04:00) (18 - 18)  SpO2: 99% (08 Jan 2024 04:00) (95% - 100%)    Parameters below as of 08 Jan 2024 04:00  Patient On (Oxygen Delivery Method): room air        General: Young female laying in bed comfortably. No acute distress  HEENT: EOMI. Clear conjunctivae. Moist mucus membrane  Neck: Supple.   Chest: CTA bilaterally. No wheezing, rales or rhonchi.   Heart: Normal S1 & S2. RRR.   Abdomen: Non distended. Soft. Non-tender. + BS  Ext: No pedal edema. No calf tenderness   Neuro: AAO x 3. No focal deficit. No speech disorder  Skin: Warm and Dry  Psychiatry: Normal mood and affect    LABS:                        8.3    10.29 )-----------( 235      ( 08 Jan 2024 05:12 )             28.0     01-08    146<H>  |  105  |  27.5<H>  ----------------------------<  89  2.7<LL>   |  28.0  |  3.95<H>    Ca    8.3<L>      08 Jan 2024 05:12  Phos  2.9     01-08  Mg     1.9     01-08    TPro  5.8<L>  /  Alb  3.2<L>  /  TBili  0.2<L>  /  DBili  x   /  AST  45<H>  /  ALT  116<H>  /  AlkPhos  52  01-08          Urinalysis Basic - ( 08 Jan 2024 05:12 )    Color: x / Appearance: x / SG: x / pH: x  Gluc: 89 mg/dL / Ketone: x  / Bili: x / Urobili: x   Blood: x / Protein: x / Nitrite: x   Leuk Esterase: x / RBC: x / WBC x   Sq Epi: x / Non Sq Epi: x / Bacteria: x        CAPILLARY BLOOD GLUCOSE            RADIOLOGY & ADDITIONAL TESTS:  Results Reviewed:   Imaging Personally Reviewed:  Electrocardiogram Personally Reviewed:         SUBJECTIVE / OVERNIGHT EVENTS: Seen and examined. Used  Delvin. She is emotional as she was given IV K due to low K. Denies chest pain, SOB, abd pain, N/V, fever, chills, dysuria or any other complaints. All remainder ROS negative.  still awaiting MR jesenia Davila improved.    MEDICATIONS  (STANDING):  acetaminophen   IVPB .. 1000 milliGRAM(s) IV Intermittent once  calcitriol   Capsule 0.25 MICROGram(s) Oral daily  calcium carbonate    500 mG (Tums) Chewable 1 Tablet(s) Chew two times a day  epoetin dev (PROCRIT) Injectable 15433 Unit(s) SubCutaneous every 7 days  fludroCORTISONE 0.1 milliGRAM(s) Oral daily  folic acid 1 milliGRAM(s) Oral daily  levothyroxine 25 MICROGram(s) Oral daily  midodrine. 10 milliGRAM(s) Oral once  multivitamin 1 Tablet(s) Oral daily  pantoprazole    Tablet 40 milliGRAM(s) Oral before breakfast  potassium chloride    Tablet ER 20 milliEquivalent(s) Oral every 2 hours  sodium bicarbonate 1300 milliGRAM(s) Oral three times a day  sodium chloride 0.9% lock flush 3 milliLiter(s) IV Push every 8 hours    MEDICATIONS  (PRN):  acetaminophen     Tablet .. 650 milliGRAM(s) Oral every 6 hours PRN Temp greater or equal to 38C (100.4F), Mild Pain (1 - 3)  albuterol    90 MICROgram(s) HFA Inhaler 2 Puff(s) Inhalation every 6 hours PRN Shortness of Breath and/or Wheezing  aluminum hydroxide/magnesium hydroxide/simethicone Suspension 30 milliLiter(s) Oral every 4 hours PRN Dyspepsia  guaiFENesin Oral Liquid (Sugar-Free) 100 milliGRAM(s) Oral every 6 hours PRN Cough  LORazepam   Injectable 1 milliGRAM(s) IV Push every 8 hours PRN Agitation  melatonin 3 milliGRAM(s) Oral at bedtime PRN Insomnia  ondansetron Injectable 4 milliGRAM(s) IV Push every 8 hours PRN Nausea and/or Vomiting        I&O's Summary    07 Jan 2024 07:01  -  08 Jan 2024 07:00  --------------------------------------------------------  IN: 1200 mL / OUT: 0 mL / NET: 1200 mL        PHYSICAL EXAM:  Vital Signs Last 24 Hrs  T(C): 36.9 (08 Jan 2024 04:00), Max: 37 (07 Jan 2024 11:35)  T(F): 98.5 (08 Jan 2024 04:00), Max: 98.6 (07 Jan 2024 11:35)  HR: 65 (08 Jan 2024 04:00) (64 - 93)  BP: 100/60 (08 Jan 2024 04:00) (100/60 - 113/75)  BP(mean): --  RR: 18 (08 Jan 2024 04:00) (18 - 18)  SpO2: 99% (08 Jan 2024 04:00) (95% - 100%)    Parameters below as of 08 Jan 2024 04:00  Patient On (Oxygen Delivery Method): room air        General: Young female laying in bed comfortably. No acute distress  HEENT: EOMI. Clear conjunctivae. Moist mucus membrane  Neck: Supple.   Chest: CTA bilaterally. No wheezing, rales or rhonchi.   Heart: Normal S1 & S2. RRR.   Abdomen: Non distended. Soft. Non-tender. + BS  Ext: No pedal edema. No calf tenderness   Neuro: AAO x 3. No focal deficit. No speech disorder  Skin: Warm and Dry  Psychiatry: Normal mood and affect    LABS:                        8.3    10.29 )-----------( 235      ( 08 Jan 2024 05:12 )             28.0     01-08    146<H>  |  105  |  27.5<H>  ----------------------------<  89  2.7<LL>   |  28.0  |  3.95<H>    Ca    8.3<L>      08 Jan 2024 05:12  Phos  2.9     01-08  Mg     1.9     01-08    TPro  5.8<L>  /  Alb  3.2<L>  /  TBili  0.2<L>  /  DBili  x   /  AST  45<H>  /  ALT  116<H>  /  AlkPhos  52  01-08          Urinalysis Basic - ( 08 Jan 2024 05:12 )    Color: x / Appearance: x / SG: x / pH: x  Gluc: 89 mg/dL / Ketone: x  / Bili: x / Urobili: x   Blood: x / Protein: x / Nitrite: x   Leuk Esterase: x / RBC: x / WBC x   Sq Epi: x / Non Sq Epi: x / Bacteria: x        CAPILLARY BLOOD GLUCOSE            RADIOLOGY & ADDITIONAL TESTS:  Results Reviewed:   Imaging Personally Reviewed:  Electrocardiogram Personally Reviewed:

## 2024-01-08 NOTE — PROGRESS NOTE ADULT - SUBJECTIVE AND OBJECTIVE BOX
NEPHROLOGY INTERVAL HPI/OVERNIGHT EVENTS:  comfortable when seen earlier  no acute overnight events    MEDICATIONS  (STANDING):  acetaminophen   IVPB .. 1000 milliGRAM(s) IV Intermittent once  calcitriol   Capsule 0.25 MICROGram(s) Oral daily  calcium carbonate    500 mG (Tums) Chewable 1 Tablet(s) Chew two times a day  epoetin dev (PROCRIT) Injectable 62597 Unit(s) SubCutaneous every 7 days  fludroCORTISONE 0.1 milliGRAM(s) Oral daily  folic acid 1 milliGRAM(s) Oral daily  levothyroxine 25 MICROGram(s) Oral daily  midodrine. 10 milliGRAM(s) Oral once  multivitamin 1 Tablet(s) Oral daily  pantoprazole    Tablet 40 milliGRAM(s) Oral before breakfast  sodium bicarbonate 1300 milliGRAM(s) Oral three times a day  sodium chloride 0.9% lock flush 3 milliLiter(s) IV Push every 8 hours    MEDICATIONS  (PRN):  acetaminophen     Tablet .. 650 milliGRAM(s) Oral every 6 hours PRN Temp greater or equal to 38C (100.4F), Mild Pain (1 - 3)  albuterol    90 MICROgram(s) HFA Inhaler 2 Puff(s) Inhalation every 6 hours PRN Shortness of Breath and/or Wheezing  aluminum hydroxide/magnesium hydroxide/simethicone Suspension 30 milliLiter(s) Oral every 4 hours PRN Dyspepsia  guaiFENesin Oral Liquid (Sugar-Free) 100 milliGRAM(s) Oral every 6 hours PRN Cough  LORazepam   Injectable 1 milliGRAM(s) IV Push every 8 hours PRN Agitation  melatonin 3 milliGRAM(s) Oral at bedtime PRN Insomnia  ondansetron Injectable 4 milliGRAM(s) IV Push every 8 hours PRN Nausea and/or Vomiting      Allergies    No Known Allergies        Vital Signs Last 24 Hrs  T(C): 36.9 (08 Jan 2024 04:00), Max: 36.9 (08 Jan 2024 04:00)  T(F): 98.5 (08 Jan 2024 04:00), Max: 98.5 (08 Jan 2024 04:00)  HR: 65 (08 Jan 2024 04:00) (64 - 93)  BP: 100/60 (08 Jan 2024 04:00) (100/60 - 103/66)  BP(mean): --  RR: 18 (08 Jan 2024 04:00) (18 - 18)  SpO2: 99% (08 Jan 2024 04:00) (95% - 100%)    Parameters below as of 08 Jan 2024 04:00  Patient On (Oxygen Delivery Method): room air        PHYSICAL EXAM:  GENERAL: NAD  HEENT: Moist mucous membranes  NECK: Supple; no JVD  NERVOUS SYSTEM: Awake, alert  EXTREMITIES: no dependent edema  Further exam deferred to limit COVID exposure     LABS:                        8.3    10.29 )-----------( 235      ( 08 Jan 2024 05:12 )             28.0     01-08    146<H>  |  105  |  27.5<H>  ----------------------------<  89  2.7<LL>   |  28.0  |  3.95<H>    Ca    8.3<L>      08 Jan 2024 05:12  Phos  2.9     01-08  Mg     1.9     01-08    TPro  5.8<L>  /  Alb  3.2<L>  /  TBili  0.2<L>  /  DBili  x   /  AST  45<H>  /  ALT  116<H>  /  AlkPhos  52  01-08      Urinalysis Basic - ( 08 Jan 2024 05:12 )    Color: x / Appearance: x / SG: x / pH: x  Gluc: 89 mg/dL / Ketone: x  / Bili: x / Urobili: x   Blood: x / Protein: x / Nitrite: x   Leuk Esterase: x / RBC: x / WBC x   Sq Epi: x / Non Sq Epi: x / Bacteria: x      Magnesium: 1.9 mg/dL (01-08 @ 05:12)  Phosphorus: 2.9 mg/dL (01-08 @ 05:12)      RADIOLOGY & ADDITIONAL TESTS:  < from: US Kidney and Bladder (01.01.24 @ 08:46) >  ACC: [id number] EXAM:  US KIDNEYS AND BLADDER   ORDERED BY: LORRAINE KIM     PROCEDURE DATE:  01/01/2024          INTERPRETATION:  CLINICAL INFORMATION: Worsening renal failure.    COMPARISON: None available.    TECHNIQUE: Sonography of thekidneys and bladder.    FINDINGS:  Right kidney: 6.0 cm. Atrophic. No hydronephrosis or calculi. Upper pole   cyst measuring 0.8 x 0.8 x 0.8 cm. Midpole isoechoic mass versus   prominent cortical lobulation measuring 2.6 x 2.6 x 2.6 cm.    Left kidney: 7.2 cm. Atrophic. No hydronephrosis or calculi. Upper pole   cyst measuring 0.9 x 0.8 x 0.7 cm.    Urinary bladder: Bilateral urinary jets visualized.    IMPRESSION:  Atrophic kidneys. No hydronephrosis.    Right renal mass versus prominent cortical lobulation. Recommend renal   protocol CT or MRI for further evaluation when clinically feasible    < end of copied text >   NEPHROLOGY INTERVAL HPI/OVERNIGHT EVENTS:  comfortable when seen earlier  no acute overnight events    MEDICATIONS  (STANDING):  acetaminophen   IVPB .. 1000 milliGRAM(s) IV Intermittent once  calcitriol   Capsule 0.25 MICROGram(s) Oral daily  calcium carbonate    500 mG (Tums) Chewable 1 Tablet(s) Chew two times a day  epoetin dev (PROCRIT) Injectable 89187 Unit(s) SubCutaneous every 7 days  fludroCORTISONE 0.1 milliGRAM(s) Oral daily  folic acid 1 milliGRAM(s) Oral daily  levothyroxine 25 MICROGram(s) Oral daily  midodrine. 10 milliGRAM(s) Oral once  multivitamin 1 Tablet(s) Oral daily  pantoprazole    Tablet 40 milliGRAM(s) Oral before breakfast  sodium bicarbonate 1300 milliGRAM(s) Oral three times a day  sodium chloride 0.9% lock flush 3 milliLiter(s) IV Push every 8 hours    MEDICATIONS  (PRN):  acetaminophen     Tablet .. 650 milliGRAM(s) Oral every 6 hours PRN Temp greater or equal to 38C (100.4F), Mild Pain (1 - 3)  albuterol    90 MICROgram(s) HFA Inhaler 2 Puff(s) Inhalation every 6 hours PRN Shortness of Breath and/or Wheezing  aluminum hydroxide/magnesium hydroxide/simethicone Suspension 30 milliLiter(s) Oral every 4 hours PRN Dyspepsia  guaiFENesin Oral Liquid (Sugar-Free) 100 milliGRAM(s) Oral every 6 hours PRN Cough  LORazepam   Injectable 1 milliGRAM(s) IV Push every 8 hours PRN Agitation  melatonin 3 milliGRAM(s) Oral at bedtime PRN Insomnia  ondansetron Injectable 4 milliGRAM(s) IV Push every 8 hours PRN Nausea and/or Vomiting      Allergies    No Known Allergies        Vital Signs Last 24 Hrs  T(C): 36.9 (08 Jan 2024 04:00), Max: 36.9 (08 Jan 2024 04:00)  T(F): 98.5 (08 Jan 2024 04:00), Max: 98.5 (08 Jan 2024 04:00)  HR: 65 (08 Jan 2024 04:00) (64 - 93)  BP: 100/60 (08 Jan 2024 04:00) (100/60 - 103/66)  BP(mean): --  RR: 18 (08 Jan 2024 04:00) (18 - 18)  SpO2: 99% (08 Jan 2024 04:00) (95% - 100%)    Parameters below as of 08 Jan 2024 04:00  Patient On (Oxygen Delivery Method): room air        PHYSICAL EXAM:  GENERAL: NAD  HEENT: Moist mucous membranes  NECK: Supple; no JVD  NERVOUS SYSTEM: Awake, alert  EXTREMITIES: no dependent edema  Further exam deferred to limit COVID exposure     LABS:                        8.3    10.29 )-----------( 235      ( 08 Jan 2024 05:12 )             28.0     01-08    146<H>  |  105  |  27.5<H>  ----------------------------<  89  2.7<LL>   |  28.0  |  3.95<H>    Ca    8.3<L>      08 Jan 2024 05:12  Phos  2.9     01-08  Mg     1.9     01-08    TPro  5.8<L>  /  Alb  3.2<L>  /  TBili  0.2<L>  /  DBili  x   /  AST  45<H>  /  ALT  116<H>  /  AlkPhos  52  01-08      Urinalysis Basic - ( 08 Jan 2024 05:12 )    Color: x / Appearance: x / SG: x / pH: x  Gluc: 89 mg/dL / Ketone: x  / Bili: x / Urobili: x   Blood: x / Protein: x / Nitrite: x   Leuk Esterase: x / RBC: x / WBC x   Sq Epi: x / Non Sq Epi: x / Bacteria: x      Magnesium: 1.9 mg/dL (01-08 @ 05:12)  Phosphorus: 2.9 mg/dL (01-08 @ 05:12)      RADIOLOGY & ADDITIONAL TESTS:  < from: US Kidney and Bladder (01.01.24 @ 08:46) >  ACC: [id number] EXAM:  US KIDNEYS AND BLADDER   ORDERED BY: LORRAINE KIM     PROCEDURE DATE:  01/01/2024          INTERPRETATION:  CLINICAL INFORMATION: Worsening renal failure.    COMPARISON: None available.    TECHNIQUE: Sonography of thekidneys and bladder.    FINDINGS:  Right kidney: 6.0 cm. Atrophic. No hydronephrosis or calculi. Upper pole   cyst measuring 0.8 x 0.8 x 0.8 cm. Midpole isoechoic mass versus   prominent cortical lobulation measuring 2.6 x 2.6 x 2.6 cm.    Left kidney: 7.2 cm. Atrophic. No hydronephrosis or calculi. Upper pole   cyst measuring 0.9 x 0.8 x 0.7 cm.    Urinary bladder: Bilateral urinary jets visualized.    IMPRESSION:  Atrophic kidneys. No hydronephrosis.    Right renal mass versus prominent cortical lobulation. Recommend renal   protocol CT or MRI for further evaluation when clinically feasible    < end of copied text >

## 2024-01-09 LAB
ACTH STIM CORTISOL 30 MIN: 18.5 UG/DL — SIGNIFICANT CHANGE UP
ACTH STIM CORTISOL 30 MIN: 18.5 UG/DL — SIGNIFICANT CHANGE UP
ACTH STIM CORTISOL 60 MIN: 19.9 UG/DL — SIGNIFICANT CHANGE UP
ACTH STIM CORTISOL 60 MIN: 19.9 UG/DL — SIGNIFICANT CHANGE UP
ACTH STIM CORTISOL BASELINE: 13.8 UG/DL — SIGNIFICANT CHANGE UP (ref 6–18.4)
ACTH STIM CORTISOL BASELINE: 13.8 UG/DL — SIGNIFICANT CHANGE UP (ref 6–18.4)
ALBUMIN SERPL ELPH-MCNC: 3.3 G/DL — SIGNIFICANT CHANGE UP (ref 3.3–5.2)
ALBUMIN SERPL ELPH-MCNC: 3.3 G/DL — SIGNIFICANT CHANGE UP (ref 3.3–5.2)
ALP SERPL-CCNC: 56 U/L — SIGNIFICANT CHANGE UP (ref 40–120)
ALP SERPL-CCNC: 56 U/L — SIGNIFICANT CHANGE UP (ref 40–120)
ALT FLD-CCNC: 97 U/L — HIGH
ALT FLD-CCNC: 97 U/L — HIGH
ANION GAP SERPL CALC-SCNC: 12 MMOL/L — SIGNIFICANT CHANGE UP (ref 5–17)
ANION GAP SERPL CALC-SCNC: 12 MMOL/L — SIGNIFICANT CHANGE UP (ref 5–17)
AST SERPL-CCNC: 38 U/L — HIGH
AST SERPL-CCNC: 38 U/L — HIGH
BASOPHILS # BLD AUTO: 0.03 K/UL — SIGNIFICANT CHANGE UP (ref 0–0.2)
BASOPHILS # BLD AUTO: 0.03 K/UL — SIGNIFICANT CHANGE UP (ref 0–0.2)
BASOPHILS NFR BLD AUTO: 0.4 % — SIGNIFICANT CHANGE UP (ref 0–2)
BASOPHILS NFR BLD AUTO: 0.4 % — SIGNIFICANT CHANGE UP (ref 0–2)
BILIRUB SERPL-MCNC: <0.2 MG/DL — LOW (ref 0.4–2)
BILIRUB SERPL-MCNC: <0.2 MG/DL — LOW (ref 0.4–2)
BUN SERPL-MCNC: 25.9 MG/DL — HIGH (ref 8–20)
BUN SERPL-MCNC: 25.9 MG/DL — HIGH (ref 8–20)
CALCIUM SERPL-MCNC: 8.7 MG/DL — SIGNIFICANT CHANGE UP (ref 8.4–10.5)
CALCIUM SERPL-MCNC: 8.7 MG/DL — SIGNIFICANT CHANGE UP (ref 8.4–10.5)
CHLORIDE SERPL-SCNC: 107 MMOL/L — SIGNIFICANT CHANGE UP (ref 96–108)
CHLORIDE SERPL-SCNC: 107 MMOL/L — SIGNIFICANT CHANGE UP (ref 96–108)
CO2 SERPL-SCNC: 26 MMOL/L — SIGNIFICANT CHANGE UP (ref 22–29)
CO2 SERPL-SCNC: 26 MMOL/L — SIGNIFICANT CHANGE UP (ref 22–29)
CREAT SERPL-MCNC: 3.39 MG/DL — HIGH (ref 0.5–1.3)
CREAT SERPL-MCNC: 3.39 MG/DL — HIGH (ref 0.5–1.3)
EGFR: 19 ML/MIN/1.73M2 — LOW
EGFR: 19 ML/MIN/1.73M2 — LOW
EOSINOPHIL # BLD AUTO: 0.23 K/UL — SIGNIFICANT CHANGE UP (ref 0–0.5)
EOSINOPHIL # BLD AUTO: 0.23 K/UL — SIGNIFICANT CHANGE UP (ref 0–0.5)
EOSINOPHIL NFR BLD AUTO: 2.7 % — SIGNIFICANT CHANGE UP (ref 0–6)
EOSINOPHIL NFR BLD AUTO: 2.7 % — SIGNIFICANT CHANGE UP (ref 0–6)
GLUCOSE SERPL-MCNC: 80 MG/DL — SIGNIFICANT CHANGE UP (ref 70–99)
GLUCOSE SERPL-MCNC: 80 MG/DL — SIGNIFICANT CHANGE UP (ref 70–99)
HCT VFR BLD CALC: 26.5 % — LOW (ref 34.5–45)
HCT VFR BLD CALC: 26.5 % — LOW (ref 34.5–45)
HGB BLD-MCNC: 8 G/DL — LOW (ref 11.5–15.5)
HGB BLD-MCNC: 8 G/DL — LOW (ref 11.5–15.5)
IMM GRANULOCYTES NFR BLD AUTO: 1.4 % — HIGH (ref 0–0.9)
IMM GRANULOCYTES NFR BLD AUTO: 1.4 % — HIGH (ref 0–0.9)
LYMPHOCYTES # BLD AUTO: 3.47 K/UL — HIGH (ref 1–3.3)
LYMPHOCYTES # BLD AUTO: 3.47 K/UL — HIGH (ref 1–3.3)
LYMPHOCYTES # BLD AUTO: 40.8 % — SIGNIFICANT CHANGE UP (ref 13–44)
LYMPHOCYTES # BLD AUTO: 40.8 % — SIGNIFICANT CHANGE UP (ref 13–44)
MCHC RBC-ENTMCNC: 27.7 PG — SIGNIFICANT CHANGE UP (ref 27–34)
MCHC RBC-ENTMCNC: 27.7 PG — SIGNIFICANT CHANGE UP (ref 27–34)
MCHC RBC-ENTMCNC: 30.2 GM/DL — LOW (ref 32–36)
MCHC RBC-ENTMCNC: 30.2 GM/DL — LOW (ref 32–36)
MCV RBC AUTO: 91.7 FL — SIGNIFICANT CHANGE UP (ref 80–100)
MCV RBC AUTO: 91.7 FL — SIGNIFICANT CHANGE UP (ref 80–100)
MONOCYTES # BLD AUTO: 0.73 K/UL — SIGNIFICANT CHANGE UP (ref 0–0.9)
MONOCYTES # BLD AUTO: 0.73 K/UL — SIGNIFICANT CHANGE UP (ref 0–0.9)
MONOCYTES NFR BLD AUTO: 8.6 % — SIGNIFICANT CHANGE UP (ref 2–14)
MONOCYTES NFR BLD AUTO: 8.6 % — SIGNIFICANT CHANGE UP (ref 2–14)
NEUTROPHILS # BLD AUTO: 3.92 K/UL — SIGNIFICANT CHANGE UP (ref 1.8–7.4)
NEUTROPHILS # BLD AUTO: 3.92 K/UL — SIGNIFICANT CHANGE UP (ref 1.8–7.4)
NEUTROPHILS NFR BLD AUTO: 46.1 % — SIGNIFICANT CHANGE UP (ref 43–77)
NEUTROPHILS NFR BLD AUTO: 46.1 % — SIGNIFICANT CHANGE UP (ref 43–77)
PLATELET # BLD AUTO: 211 K/UL — SIGNIFICANT CHANGE UP (ref 150–400)
PLATELET # BLD AUTO: 211 K/UL — SIGNIFICANT CHANGE UP (ref 150–400)
POTASSIUM SERPL-MCNC: 3.8 MMOL/L — SIGNIFICANT CHANGE UP (ref 3.5–5.3)
POTASSIUM SERPL-MCNC: 3.8 MMOL/L — SIGNIFICANT CHANGE UP (ref 3.5–5.3)
POTASSIUM SERPL-SCNC: 3.8 MMOL/L — SIGNIFICANT CHANGE UP (ref 3.5–5.3)
POTASSIUM SERPL-SCNC: 3.8 MMOL/L — SIGNIFICANT CHANGE UP (ref 3.5–5.3)
PROT SERPL-MCNC: 5.9 G/DL — LOW (ref 6.6–8.7)
PROT SERPL-MCNC: 5.9 G/DL — LOW (ref 6.6–8.7)
RBC # BLD: 2.89 M/UL — LOW (ref 3.8–5.2)
RBC # BLD: 2.89 M/UL — LOW (ref 3.8–5.2)
RBC # FLD: 17.2 % — HIGH (ref 10.3–14.5)
RBC # FLD: 17.2 % — HIGH (ref 10.3–14.5)
SODIUM SERPL-SCNC: 144 MMOL/L — SIGNIFICANT CHANGE UP (ref 135–145)
SODIUM SERPL-SCNC: 144 MMOL/L — SIGNIFICANT CHANGE UP (ref 135–145)
WBC # BLD: 8.5 K/UL — SIGNIFICANT CHANGE UP (ref 3.8–10.5)
WBC # BLD: 8.5 K/UL — SIGNIFICANT CHANGE UP (ref 3.8–10.5)
WBC # FLD AUTO: 8.5 K/UL — SIGNIFICANT CHANGE UP (ref 3.8–10.5)
WBC # FLD AUTO: 8.5 K/UL — SIGNIFICANT CHANGE UP (ref 3.8–10.5)

## 2024-01-09 PROCEDURE — 99233 SBSQ HOSP IP/OBS HIGH 50: CPT

## 2024-01-09 PROCEDURE — 99232 SBSQ HOSP IP/OBS MODERATE 35: CPT

## 2024-01-09 RX ADMIN — SODIUM CHLORIDE 3 MILLILITER(S): 9 INJECTION INTRAMUSCULAR; INTRAVENOUS; SUBCUTANEOUS at 06:39

## 2024-01-09 RX ADMIN — Medication 1 TABLET(S): at 08:34

## 2024-01-09 RX ADMIN — Medication 650 MILLIGRAM(S): at 13:07

## 2024-01-09 RX ADMIN — Medication 1 TABLET(S): at 05:41

## 2024-01-09 RX ADMIN — Medication 25 MICROGRAM(S): at 05:42

## 2024-01-09 RX ADMIN — SODIUM CHLORIDE 3 MILLILITER(S): 9 INJECTION INTRAMUSCULAR; INTRAVENOUS; SUBCUTANEOUS at 21:27

## 2024-01-09 RX ADMIN — PANTOPRAZOLE SODIUM 40 MILLIGRAM(S): 20 TABLET, DELAYED RELEASE ORAL at 05:42

## 2024-01-09 RX ADMIN — Medication 650 MILLIGRAM(S): at 05:42

## 2024-01-09 RX ADMIN — SODIUM CHLORIDE 3 MILLILITER(S): 9 INJECTION INTRAMUSCULAR; INTRAVENOUS; SUBCUTANEOUS at 12:39

## 2024-01-09 RX ADMIN — CALCITRIOL 0.25 MICROGRAM(S): 0.5 CAPSULE ORAL at 08:35

## 2024-01-09 RX ADMIN — Medication 1 TABLET(S): at 16:49

## 2024-01-09 RX ADMIN — FLUDROCORTISONE ACETATE 0.1 MILLIGRAM(S): 0.1 TABLET ORAL at 05:42

## 2024-01-09 RX ADMIN — Medication 1 MILLIGRAM(S): at 08:35

## 2024-01-09 RX ADMIN — Medication 650 MILLIGRAM(S): at 21:29

## 2024-01-09 NOTE — PROGRESS NOTE ADULT - ASSESSMENT
22 y/o female whose real name is Theresa Tsang. brought from [employer] for evaluation. Patient was apparently in her normal state of health at a [employer] event and shortly after eating rice/beans and meat started c/o abdominal pain and cramping and then vomited. Friends who witnessed it said they called EMS due to patient being very agitated. She has a hx of "kidney problems" and used to be on medications for it but self discontinued " a while ago."Labs with Hbg of 8.6 and Cr. of 3.95 with reported baseline of 2.5. No reported mental health history. Patient now at mental baseline however hypotension in setting of possible AI? Sinus bradycardia and awaiting MR abdomen for renal lesions due to poor outpatient follow up.     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 Pain related?   -CTH neg  -Utox positive for benzo likely given en route to ER  -Back to baseline now   -Monitor     Anemia  -Likely on basis of CKD  -Low iron stores, started on Venofer   -FOBT negative     COVID-19/Leukocytosis  -No hypoxia  -Supportive Care  -DC zosyn, s/p 3 days  -Bcx negative to date  -Increased wbc likely due to steroids     Hypocalcemia  Hypokalemia  -Replete and monitor      DVT Prophylaxis -- Venodyne  Dispo: Home once stable.    24 y/o female whose real name is Theresa Tsang. brought from [facility] for evaluation. Patient was apparently in her normal state of health at a [employer] event and shortly after eating rice/beans and meat started c/o abdominal pain and cramping and then vomited. Friends who witnessed it said they called EMS due to patient being very agitated. She has a hx of "kidney problems" and used to be on medications for it but self discontinued " a while ago."Labs with Hbg of 8.6 and Cr. of 3.95 with reported baseline of 2.5. No reported mental health history. Patient now at mental baseline however hypotension in setting of possible AI? Sinus bradycardia and awaiting MR abdomen for renal lesions due to poor outpatient follow up.     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 Pain related?   -CTH neg  -Utox positive for benzo likely given en route to ER  -Back to baseline now   -Monitor     Anemia  -Likely on basis of CKD  -Low iron stores, started on Venofer   -FOBT negative     COVID-19/Leukocytosis  -No hypoxia  -Supportive Care  -DC zosyn, s/p 3 days  -Bcx negative to date  -Increased wbc likely due to steroids     Hypocalcemia  Hypokalemia  -Replete and monitor      DVT Prophylaxis -- Venodyne  Dispo: Home once stable.

## 2024-01-09 NOTE — PROGRESS NOTE ADULT - NS ATTEND AMEND GEN_ALL_CORE FT
I agree with plan above and discussed with NP.  ACTH stim test seems ok since she does not seem sick or stress.   peak cortisol 19.9 . Will cont to monitor clinically.   cotn lT4

## 2024-01-09 NOTE — PROGRESS NOTE ADULT - SUBJECTIVE AND OBJECTIVE BOX
NEPHROLOGY INTERVAL HPI/OVERNIGHT EVENTS:  no adverse overnight events noted  comfortable    MEDICATIONS  (STANDING):  acetaminophen   IVPB .. 1000 milliGRAM(s) IV Intermittent once  calcitriol   Capsule 0.25 MICROGram(s) Oral daily  calcium carbonate    500 mG (Tums) Chewable 1 Tablet(s) Chew two times a day  epoetin dev (PROCRIT) Injectable 30500 Unit(s) SubCutaneous every 7 days  fludroCORTISONE 0.1 milliGRAM(s) Oral daily  folic acid 1 milliGRAM(s) Oral daily  levothyroxine 25 MICROGram(s) Oral daily  midodrine. 10 milliGRAM(s) Oral once  multivitamin 1 Tablet(s) Oral daily  pantoprazole    Tablet 40 milliGRAM(s) Oral before breakfast  sodium bicarbonate 650 milliGRAM(s) Oral three times a day  sodium chloride 0.9% lock flush 3 milliLiter(s) IV Push every 8 hours    MEDICATIONS  (PRN):  acetaminophen     Tablet .. 650 milliGRAM(s) Oral every 6 hours PRN Temp greater or equal to 38C (100.4F), Mild Pain (1 - 3)  albuterol    90 MICROgram(s) HFA Inhaler 2 Puff(s) Inhalation every 6 hours PRN Shortness of Breath and/or Wheezing  aluminum hydroxide/magnesium hydroxide/simethicone Suspension 30 milliLiter(s) Oral every 4 hours PRN Dyspepsia  guaiFENesin Oral Liquid (Sugar-Free) 100 milliGRAM(s) Oral every 6 hours PRN Cough  melatonin 3 milliGRAM(s) Oral at bedtime PRN Insomnia  ondansetron Injectable 4 milliGRAM(s) IV Push every 8 hours PRN Nausea and/or Vomiting      Allergies    No Known Allergies        Vital Signs Last 24 Hrs  T(C): 36.9 (09 Jan 2024 05:45), Max: 37 (08 Jan 2024 17:02)  T(F): 98.5 (09 Jan 2024 05:45), Max: 98.6 (08 Jan 2024 17:02)  HR: 73 (09 Jan 2024 05:45) (68 - 73)  BP: 122/77 (09 Jan 2024 05:45) (107/62 - 122/77)  BP(mean): --  RR: 17 (09 Jan 2024 05:45) (17 - 18)  SpO2: 99% (09 Jan 2024 05:45) (94% - 99%)    Parameters below as of 09 Jan 2024 05:45  Patient On (Oxygen Delivery Method): room air        PHYSICAL EXAM:  GENERAL: Comfortable  HEENT: No periorbital edema  NECK: Supple; no JVD  NERVOUS SYSTEM: Awake, alert  EXTREMITIES: no dependent edema  Further exam deferred to limit COVID exposure    LABS:                        8.0    8.50  )-----------( 211      ( 09 Jan 2024 05:15 )             26.5     01-08    146<H>  |  104  |  26.9<H>  ----------------------------<  79  3.1<L>   |  28.0  |  3.55<H>    Ca    9.2      08 Jan 2024 15:39  Phos  2.9     01-08  Mg     1.9     01-08    TPro  5.8<L>  /  Alb  3.2<L>  /  TBili  0.2<L>  /  DBili  x   /  AST  45<H>  /  ALT  116<H>  /  AlkPhos  52  01-08      Urinalysis Basic - ( 08 Jan 2024 15:39 )    Color: x / Appearance: x / SG: x / pH: x  Gluc: 79 mg/dL / Ketone: x  / Bili: x / Urobili: x   Blood: x / Protein: x / Nitrite: x   Leuk Esterase: x / RBC: x / WBC x   Sq Epi: x / Non Sq Epi: x / Bacteria: x          RADIOLOGY & ADDITIONAL TESTS:   NEPHROLOGY INTERVAL HPI/OVERNIGHT EVENTS:  no adverse overnight events noted  comfortable    MEDICATIONS  (STANDING):  acetaminophen   IVPB .. 1000 milliGRAM(s) IV Intermittent once  calcitriol   Capsule 0.25 MICROGram(s) Oral daily  calcium carbonate    500 mG (Tums) Chewable 1 Tablet(s) Chew two times a day  epoetin dev (PROCRIT) Injectable 74316 Unit(s) SubCutaneous every 7 days  fludroCORTISONE 0.1 milliGRAM(s) Oral daily  folic acid 1 milliGRAM(s) Oral daily  levothyroxine 25 MICROGram(s) Oral daily  midodrine. 10 milliGRAM(s) Oral once  multivitamin 1 Tablet(s) Oral daily  pantoprazole    Tablet 40 milliGRAM(s) Oral before breakfast  sodium bicarbonate 650 milliGRAM(s) Oral three times a day  sodium chloride 0.9% lock flush 3 milliLiter(s) IV Push every 8 hours    MEDICATIONS  (PRN):  acetaminophen     Tablet .. 650 milliGRAM(s) Oral every 6 hours PRN Temp greater or equal to 38C (100.4F), Mild Pain (1 - 3)  albuterol    90 MICROgram(s) HFA Inhaler 2 Puff(s) Inhalation every 6 hours PRN Shortness of Breath and/or Wheezing  aluminum hydroxide/magnesium hydroxide/simethicone Suspension 30 milliLiter(s) Oral every 4 hours PRN Dyspepsia  guaiFENesin Oral Liquid (Sugar-Free) 100 milliGRAM(s) Oral every 6 hours PRN Cough  melatonin 3 milliGRAM(s) Oral at bedtime PRN Insomnia  ondansetron Injectable 4 milliGRAM(s) IV Push every 8 hours PRN Nausea and/or Vomiting      Allergies    No Known Allergies        Vital Signs Last 24 Hrs  T(C): 36.9 (09 Jan 2024 05:45), Max: 37 (08 Jan 2024 17:02)  T(F): 98.5 (09 Jan 2024 05:45), Max: 98.6 (08 Jan 2024 17:02)  HR: 73 (09 Jan 2024 05:45) (68 - 73)  BP: 122/77 (09 Jan 2024 05:45) (107/62 - 122/77)  BP(mean): --  RR: 17 (09 Jan 2024 05:45) (17 - 18)  SpO2: 99% (09 Jan 2024 05:45) (94% - 99%)    Parameters below as of 09 Jan 2024 05:45  Patient On (Oxygen Delivery Method): room air        PHYSICAL EXAM:  GENERAL: Comfortable  HEENT: No periorbital edema  NECK: Supple; no JVD  NERVOUS SYSTEM: Awake, alert  EXTREMITIES: no dependent edema  Further exam deferred to limit COVID exposure    LABS:                        8.0    8.50  )-----------( 211      ( 09 Jan 2024 05:15 )             26.5     01-08    146<H>  |  104  |  26.9<H>  ----------------------------<  79  3.1<L>   |  28.0  |  3.55<H>    Ca    9.2      08 Jan 2024 15:39  Phos  2.9     01-08  Mg     1.9     01-08    TPro  5.8<L>  /  Alb  3.2<L>  /  TBili  0.2<L>  /  DBili  x   /  AST  45<H>  /  ALT  116<H>  /  AlkPhos  52  01-08      Urinalysis Basic - ( 08 Jan 2024 15:39 )    Color: x / Appearance: x / SG: x / pH: x  Gluc: 79 mg/dL / Ketone: x  / Bili: x / Urobili: x   Blood: x / Protein: x / Nitrite: x   Leuk Esterase: x / RBC: x / WBC x   Sq Epi: x / Non Sq Epi: x / Bacteria: x          RADIOLOGY & ADDITIONAL TESTS:   NEPHROLOGY INTERVAL HPI/OVERNIGHT EVENTS:  no adverse overnight events noted  comfortable    MEDICATIONS  (STANDING):  acetaminophen   IVPB .. 1000 milliGRAM(s) IV Intermittent once  calcitriol   Capsule 0.25 MICROGram(s) Oral daily  calcium carbonate    500 mG (Tums) Chewable 1 Tablet(s) Chew two times a day  epoetin dev (PROCRIT) Injectable 33634 Unit(s) SubCutaneous every 7 days  fludroCORTISONE 0.1 milliGRAM(s) Oral daily  folic acid 1 milliGRAM(s) Oral daily  levothyroxine 25 MICROGram(s) Oral daily  midodrine. 10 milliGRAM(s) Oral once  multivitamin 1 Tablet(s) Oral daily  pantoprazole    Tablet 40 milliGRAM(s) Oral before breakfast  sodium bicarbonate 650 milliGRAM(s) Oral three times a day  sodium chloride 0.9% lock flush 3 milliLiter(s) IV Push every 8 hours    MEDICATIONS  (PRN):  acetaminophen     Tablet .. 650 milliGRAM(s) Oral every 6 hours PRN Temp greater or equal to 38C (100.4F), Mild Pain (1 - 3)  albuterol    90 MICROgram(s) HFA Inhaler 2 Puff(s) Inhalation every 6 hours PRN Shortness of Breath and/or Wheezing  aluminum hydroxide/magnesium hydroxide/simethicone Suspension 30 milliLiter(s) Oral every 4 hours PRN Dyspepsia  guaiFENesin Oral Liquid (Sugar-Free) 100 milliGRAM(s) Oral every 6 hours PRN Cough  melatonin 3 milliGRAM(s) Oral at bedtime PRN Insomnia  ondansetron Injectable 4 milliGRAM(s) IV Push every 8 hours PRN Nausea and/or Vomiting      Allergies    No Known Allergies        Vital Signs Last 24 Hrs  T(C): 36.9 (09 Jan 2024 05:45), Max: 37 (08 Jan 2024 17:02)  T(F): 98.5 (09 Jan 2024 05:45), Max: 98.6 (08 Jan 2024 17:02)  HR: 73 (09 Jan 2024 05:45) (68 - 73)  BP: 122/77 (09 Jan 2024 05:45) (107/62 - 122/77)  BP(mean): --  RR: 17 (09 Jan 2024 05:45) (17 - 18)  SpO2: 99% (09 Jan 2024 05:45) (94% - 99%)    Parameters below as of 09 Jan 2024 05:45  Patient On (Oxygen Delivery Method): room air        PHYSICAL EXAM:  GENERAL: Comfortable  HEENT: No periorbital edema  NECK: Supple; no JVD  NERVOUS SYSTEM: Awake, alert  EXTREMITIES: no dependent edema  Further exam deferred to limit COVID exposure    LABS:                        8.0    8.50  )-----------( 211      ( 09 Jan 2024 05:15 )             26.5     01-08    146<H>  |  104  |  26.9<H>  ----------------------------<  79  3.1<L>   |  28.0  |  3.55<H>    Ca    9.2      08 Jan 2024 15:39  Phos  2.9     01-08  Mg     1.9     01-08    TPro  5.8<L>  /  Alb  3.2<L>  /  TBili  0.2<L>  /  DBili  x   /  AST  45<H>  /  ALT  116<H>  /  AlkPhos  52  01-08      Comprehensive Metabolic Panel in AM (01.09.24 @ 05:15)   Sodium: 144 mmol/L  Potassium: 3.8 mmol/L  Chloride: 107: Chloride reference range changed from ..10/26/2022 mmol/L  Carbon Dioxide: 26.0 mmol/L  Anion Gap: 12 mmol/L  Blood Urea Nitrogen: 25.9 mg/dL  Creatinine: 3.39 mg/dL  Glucose: 80 mg/dL  Calcium: 8.7 mg/dL  Protein Total: 5.9 g/dL  Albumin: 3.3 g/dL  Bilirubin Total: <0.2 mg/dL  Alkaline Phosphatase: 56 U/L  Aspartate Aminotransferase (AST/SGOT): 38 U/L  Alanine Aminotransferase (ALT/SGPT): 97 U/L    Urinalysis Basic - ( 08 Jan 2024 15:39 )    Color: x / Appearance: x / SG: x / pH: x  Gluc: 79 mg/dL / Ketone: x  / Bili: x / Urobili: x   Blood: x / Protein: x / Nitrite: x   Leuk Esterase: x / RBC: x / WBC x   Sq Epi: x / Non Sq Epi: x / Bacteria: x          RADIOLOGY & ADDITIONAL TESTS:   NEPHROLOGY INTERVAL HPI/OVERNIGHT EVENTS:  no adverse overnight events noted  comfortable    MEDICATIONS  (STANDING):  acetaminophen   IVPB .. 1000 milliGRAM(s) IV Intermittent once  calcitriol   Capsule 0.25 MICROGram(s) Oral daily  calcium carbonate    500 mG (Tums) Chewable 1 Tablet(s) Chew two times a day  epoetin dev (PROCRIT) Injectable 99462 Unit(s) SubCutaneous every 7 days  fludroCORTISONE 0.1 milliGRAM(s) Oral daily  folic acid 1 milliGRAM(s) Oral daily  levothyroxine 25 MICROGram(s) Oral daily  midodrine. 10 milliGRAM(s) Oral once  multivitamin 1 Tablet(s) Oral daily  pantoprazole    Tablet 40 milliGRAM(s) Oral before breakfast  sodium bicarbonate 650 milliGRAM(s) Oral three times a day  sodium chloride 0.9% lock flush 3 milliLiter(s) IV Push every 8 hours    MEDICATIONS  (PRN):  acetaminophen     Tablet .. 650 milliGRAM(s) Oral every 6 hours PRN Temp greater or equal to 38C (100.4F), Mild Pain (1 - 3)  albuterol    90 MICROgram(s) HFA Inhaler 2 Puff(s) Inhalation every 6 hours PRN Shortness of Breath and/or Wheezing  aluminum hydroxide/magnesium hydroxide/simethicone Suspension 30 milliLiter(s) Oral every 4 hours PRN Dyspepsia  guaiFENesin Oral Liquid (Sugar-Free) 100 milliGRAM(s) Oral every 6 hours PRN Cough  melatonin 3 milliGRAM(s) Oral at bedtime PRN Insomnia  ondansetron Injectable 4 milliGRAM(s) IV Push every 8 hours PRN Nausea and/or Vomiting      Allergies    No Known Allergies        Vital Signs Last 24 Hrs  T(C): 36.9 (09 Jan 2024 05:45), Max: 37 (08 Jan 2024 17:02)  T(F): 98.5 (09 Jan 2024 05:45), Max: 98.6 (08 Jan 2024 17:02)  HR: 73 (09 Jan 2024 05:45) (68 - 73)  BP: 122/77 (09 Jan 2024 05:45) (107/62 - 122/77)  BP(mean): --  RR: 17 (09 Jan 2024 05:45) (17 - 18)  SpO2: 99% (09 Jan 2024 05:45) (94% - 99%)    Parameters below as of 09 Jan 2024 05:45  Patient On (Oxygen Delivery Method): room air        PHYSICAL EXAM:  GENERAL: Comfortable  HEENT: No periorbital edema  NECK: Supple; no JVD  NERVOUS SYSTEM: Awake, alert  EXTREMITIES: no dependent edema  Further exam deferred to limit COVID exposure    LABS:                        8.0    8.50  )-----------( 211      ( 09 Jan 2024 05:15 )             26.5     01-08    146<H>  |  104  |  26.9<H>  ----------------------------<  79  3.1<L>   |  28.0  |  3.55<H>    Ca    9.2      08 Jan 2024 15:39  Phos  2.9     01-08  Mg     1.9     01-08    TPro  5.8<L>  /  Alb  3.2<L>  /  TBili  0.2<L>  /  DBili  x   /  AST  45<H>  /  ALT  116<H>  /  AlkPhos  52  01-08      Comprehensive Metabolic Panel in AM (01.09.24 @ 05:15)   Sodium: 144 mmol/L  Potassium: 3.8 mmol/L  Chloride: 107: Chloride reference range changed from ..10/26/2022 mmol/L  Carbon Dioxide: 26.0 mmol/L  Anion Gap: 12 mmol/L  Blood Urea Nitrogen: 25.9 mg/dL  Creatinine: 3.39 mg/dL  Glucose: 80 mg/dL  Calcium: 8.7 mg/dL  Protein Total: 5.9 g/dL  Albumin: 3.3 g/dL  Bilirubin Total: <0.2 mg/dL  Alkaline Phosphatase: 56 U/L  Aspartate Aminotransferase (AST/SGOT): 38 U/L  Alanine Aminotransferase (ALT/SGPT): 97 U/L    Urinalysis Basic - ( 08 Jan 2024 15:39 )    Color: x / Appearance: x / SG: x / pH: x  Gluc: 79 mg/dL / Ketone: x  / Bili: x / Urobili: x   Blood: x / Protein: x / Nitrite: x   Leuk Esterase: x / RBC: x / WBC x   Sq Epi: x / Non Sq Epi: x / Bacteria: x          RADIOLOGY & ADDITIONAL TESTS:

## 2024-01-09 NOTE — PROGRESS NOTE ADULT - SUBJECTIVE AND OBJECTIVE BOX
INTERVAL EVENTS:  Follow up thyroid management   used for translation- ID # [id number]    ROS: Denies chest pain or sob. Endorses good appetite. Denies any pain    MEDICATIONS  (STANDING):  acetaminophen   IVPB .. 1000 milliGRAM(s) IV Intermittent once  calcitriol   Capsule 0.25 MICROGram(s) Oral daily  calcium carbonate    500 mG (Tums) Chewable 1 Tablet(s) Chew two times a day  epoetin dev (PROCRIT) Injectable 05974 Unit(s) SubCutaneous every 7 days  fludroCORTISONE 0.1 milliGRAM(s) Oral daily  folic acid 1 milliGRAM(s) Oral daily  levothyroxine 25 MICROGram(s) Oral daily  midodrine. 10 milliGRAM(s) Oral once  multivitamin 1 Tablet(s) Oral daily  pantoprazole    Tablet 40 milliGRAM(s) Oral before breakfast  sodium bicarbonate 650 milliGRAM(s) Oral three times a day  sodium chloride 0.9% lock flush 3 milliLiter(s) IV Push every 8 hours    MEDICATIONS  (PRN):  acetaminophen     Tablet .. 650 milliGRAM(s) Oral every 6 hours PRN Temp greater or equal to 38C (100.4F), Mild Pain (1 - 3)  albuterol    90 MICROgram(s) HFA Inhaler 2 Puff(s) Inhalation every 6 hours PRN Shortness of Breath and/or Wheezing  aluminum hydroxide/magnesium hydroxide/simethicone Suspension 30 milliLiter(s) Oral every 4 hours PRN Dyspepsia  guaiFENesin Oral Liquid (Sugar-Free) 100 milliGRAM(s) Oral every 6 hours PRN Cough  melatonin 3 milliGRAM(s) Oral at bedtime PRN Insomnia  ondansetron Injectable 4 milliGRAM(s) IV Push every 8 hours PRN Nausea and/or Vomiting      Allergies  No Known Allergies      Vital Signs Last 24 Hrs  T(C): 36.9 (09 Jan 2024 05:45), Max: 37 (08 Jan 2024 17:02)  T(F): 98.5 (09 Jan 2024 05:45), Max: 98.6 (08 Jan 2024 17:02)  HR: 73 (09 Jan 2024 05:45) (68 - 73)  BP: 122/77 (09 Jan 2024 05:45) (107/62 - 122/77)  BP(mean): --  RR: 17 (09 Jan 2024 05:45) (17 - 18)  SpO2: 99% (09 Jan 2024 05:45) (94% - 99%)    Parameters below as of 09 Jan 2024 05:45  Patient On (Oxygen Delivery Method): room air        PHYSICAL EXAM:  GENERAL: NAD, comfortable  NECK: Supple; trachea midline  CHEST/LUNG: Clear to auscultation bilaterally; No wheezes  HEART: Regular rate and rhythm; No murmurs, rubs, or gallops  ABDOMEN: Soft, Nontender, Nondistended; Bowel sounds present  NEURO: AAOx3, no tremor  EXTREMITIES:  2+ Peripheral Pulses, no edema        LABS:                        8.0    8.50  )-----------( 211      ( 09 Jan 2024 05:15 )             26.5     01-09    144  |  107  |  25.9<H>  ----------------------------<  80  3.8   |  26.0  |  3.39<H>    Ca    8.7      09 Jan 2024 05:15  Phos  2.9     01-08  Mg     1.9     01-08    TPro  5.9<L>  /  Alb  3.3  /  TBili  <0.2<L>  /  DBili  x   /  AST  38<H>  /  ALT  97<H>  /  AlkPhos  56  01-09    Urinalysis Basic - ( 09 Jan 2024 05:15 )    Color: x / Appearance: x / SG: x / pH: x  Gluc: 80 mg/dL / Ketone: x  / Bili: x / Urobili: x   Blood: x / Protein: x / Nitrite: x   Leuk Esterase: x / RBC: x / WBC x   Sq Epi: x / Non Sq Epi: x / Bacteria: x          Free Thyroxine, Serum: 1.1 ng/dL (01-05-24 @ 06:16)  Thyroid Stimulating Hormone, Serum: 3.55 uIU/mL (01-05-24 @ 06:16)  Thyroid Stimulating Hormone, Serum: 1.99 uIU/mL (01-03-24 @ 06:42)   INTERVAL EVENTS:  Follow up thyroid management   used for translation- ID # [id number]    ROS: Denies chest pain or sob. Endorses good appetite. Denies any pain    MEDICATIONS  (STANDING):  acetaminophen   IVPB .. 1000 milliGRAM(s) IV Intermittent once  calcitriol   Capsule 0.25 MICROGram(s) Oral daily  calcium carbonate    500 mG (Tums) Chewable 1 Tablet(s) Chew two times a day  epoetin dev (PROCRIT) Injectable 46319 Unit(s) SubCutaneous every 7 days  fludroCORTISONE 0.1 milliGRAM(s) Oral daily  folic acid 1 milliGRAM(s) Oral daily  levothyroxine 25 MICROGram(s) Oral daily  midodrine. 10 milliGRAM(s) Oral once  multivitamin 1 Tablet(s) Oral daily  pantoprazole    Tablet 40 milliGRAM(s) Oral before breakfast  sodium bicarbonate 650 milliGRAM(s) Oral three times a day  sodium chloride 0.9% lock flush 3 milliLiter(s) IV Push every 8 hours    MEDICATIONS  (PRN):  acetaminophen     Tablet .. 650 milliGRAM(s) Oral every 6 hours PRN Temp greater or equal to 38C (100.4F), Mild Pain (1 - 3)  albuterol    90 MICROgram(s) HFA Inhaler 2 Puff(s) Inhalation every 6 hours PRN Shortness of Breath and/or Wheezing  aluminum hydroxide/magnesium hydroxide/simethicone Suspension 30 milliLiter(s) Oral every 4 hours PRN Dyspepsia  guaiFENesin Oral Liquid (Sugar-Free) 100 milliGRAM(s) Oral every 6 hours PRN Cough  melatonin 3 milliGRAM(s) Oral at bedtime PRN Insomnia  ondansetron Injectable 4 milliGRAM(s) IV Push every 8 hours PRN Nausea and/or Vomiting      Allergies  No Known Allergies      Vital Signs Last 24 Hrs  T(C): 36.9 (09 Jan 2024 05:45), Max: 37 (08 Jan 2024 17:02)  T(F): 98.5 (09 Jan 2024 05:45), Max: 98.6 (08 Jan 2024 17:02)  HR: 73 (09 Jan 2024 05:45) (68 - 73)  BP: 122/77 (09 Jan 2024 05:45) (107/62 - 122/77)  BP(mean): --  RR: 17 (09 Jan 2024 05:45) (17 - 18)  SpO2: 99% (09 Jan 2024 05:45) (94% - 99%)    Parameters below as of 09 Jan 2024 05:45  Patient On (Oxygen Delivery Method): room air        PHYSICAL EXAM:  GENERAL: NAD, comfortable  NECK: Supple; trachea midline  CHEST/LUNG: Clear to auscultation bilaterally; No wheezes  HEART: Regular rate and rhythm; No murmurs, rubs, or gallops  ABDOMEN: Soft, Nontender, Nondistended; Bowel sounds present  NEURO: AAOx3, no tremor  EXTREMITIES:  2+ Peripheral Pulses, no edema        LABS:                        8.0    8.50  )-----------( 211      ( 09 Jan 2024 05:15 )             26.5     01-09    144  |  107  |  25.9<H>  ----------------------------<  80  3.8   |  26.0  |  3.39<H>    Ca    8.7      09 Jan 2024 05:15  Phos  2.9     01-08  Mg     1.9     01-08    TPro  5.9<L>  /  Alb  3.3  /  TBili  <0.2<L>  /  DBili  x   /  AST  38<H>  /  ALT  97<H>  /  AlkPhos  56  01-09    Urinalysis Basic - ( 09 Jan 2024 05:15 )    Color: x / Appearance: x / SG: x / pH: x  Gluc: 80 mg/dL / Ketone: x  / Bili: x / Urobili: x   Blood: x / Protein: x / Nitrite: x   Leuk Esterase: x / RBC: x / WBC x   Sq Epi: x / Non Sq Epi: x / Bacteria: x          Free Thyroxine, Serum: 1.1 ng/dL (01-05-24 @ 06:16)  Thyroid Stimulating Hormone, Serum: 3.55 uIU/mL (01-05-24 @ 06:16)  Thyroid Stimulating Hormone, Serum: 1.99 uIU/mL (01-03-24 @ 06:42)

## 2024-01-09 NOTE — PROGRESS NOTE ADULT - SUBJECTIVE AND OBJECTIVE BOX
SUBJECTIVE / OVERNIGHT EVENTS: Seen and examined. Used  Marge. feels well. Awaiting MRI abdomen. Denies chest pain, SOB, abd pain, N/V, fever, chills, dysuria or any other complaints. All remainder ROS negative.     MEDICATIONS  (STANDING):  acetaminophen   IVPB .. 1000 milliGRAM(s) IV Intermittent once  calcitriol   Capsule 0.25 MICROGram(s) Oral daily  calcium carbonate    500 mG (Tums) Chewable 1 Tablet(s) Chew two times a day  epoetin dev (PROCRIT) Injectable 08348 Unit(s) SubCutaneous every 7 days  fludroCORTISONE 0.1 milliGRAM(s) Oral daily  folic acid 1 milliGRAM(s) Oral daily  levothyroxine 25 MICROGram(s) Oral daily  midodrine. 10 milliGRAM(s) Oral once  multivitamin 1 Tablet(s) Oral daily  pantoprazole    Tablet 40 milliGRAM(s) Oral before breakfast  sodium bicarbonate 650 milliGRAM(s) Oral three times a day  sodium chloride 0.9% lock flush 3 milliLiter(s) IV Push every 8 hours    MEDICATIONS  (PRN):  acetaminophen     Tablet .. 650 milliGRAM(s) Oral every 6 hours PRN Temp greater or equal to 38C (100.4F), Mild Pain (1 - 3)  albuterol    90 MICROgram(s) HFA Inhaler 2 Puff(s) Inhalation every 6 hours PRN Shortness of Breath and/or Wheezing  aluminum hydroxide/magnesium hydroxide/simethicone Suspension 30 milliLiter(s) Oral every 4 hours PRN Dyspepsia  guaiFENesin Oral Liquid (Sugar-Free) 100 milliGRAM(s) Oral every 6 hours PRN Cough  melatonin 3 milliGRAM(s) Oral at bedtime PRN Insomnia  ondansetron Injectable 4 milliGRAM(s) IV Push every 8 hours PRN Nausea and/or Vomiting        I&O's Summary    08 Jan 2024 07:01  -  09 Jan 2024 07:00  --------------------------------------------------------  IN: 1400 mL / OUT: 0 mL / NET: 1400 mL    09 Jan 2024 07:01  -  09 Jan 2024 10:54  --------------------------------------------------------  IN: 400 mL / OUT: 0 mL / NET: 400 mL        PHYSICAL EXAM:  Vital Signs Last 24 Hrs  T(C): 36.9 (09 Jan 2024 10:31), Max: 37 (08 Jan 2024 17:02)  T(F): 98.4 (09 Jan 2024 10:31), Max: 98.6 (08 Jan 2024 17:02)  HR: 67 (09 Jan 2024 10:31) (67 - 73)  BP: 111/67 (09 Jan 2024 10:31) (107/62 - 122/77)  BP(mean): --  RR: 18 (09 Jan 2024 10:31) (17 - 18)  SpO2: 98% (09 Jan 2024 10:31) (94% - 99%)    Parameters below as of 09 Jan 2024 05:45  Patient On (Oxygen Delivery Method): room air          General: Young female laying in bed comfortably. No acute distress  HEENT: EOMI. Clear conjunctivae. Moist mucus membrane  Neck: Supple.   Chest: CTA bilaterally. No wheezing, rales or rhonchi.   Heart: Normal S1 & S2. RRR.   Abdomen: Non distended. Soft. Non-tender. + BS  Ext: No pedal edema. No calf tenderness   Neuro: AAO x 3. No focal deficit. No speech disorder  Skin: Warm and Dry  Psychiatry: Normal mood and affect    LABS:                        8.0    8.50  )-----------( 211      ( 09 Jan 2024 05:15 )             26.5     01-09    144  |  107  |  25.9<H>  ----------------------------<  80  3.8   |  26.0  |  3.39<H>    Ca    8.7      09 Jan 2024 05:15  Phos  2.9     01-08  Mg     1.9     01-08    TPro  5.9<L>  /  Alb  3.3  /  TBili  <0.2<L>  /  DBili  x   /  AST  38<H>  /  ALT  97<H>  /  AlkPhos  56  01-09          Urinalysis Basic - ( 09 Jan 2024 05:15 )    Color: x / Appearance: x / SG: x / pH: x  Gluc: 80 mg/dL / Ketone: x  / Bili: x / Urobili: x   Blood: x / Protein: x / Nitrite: x   Leuk Esterase: x / RBC: x / WBC x   Sq Epi: x / Non Sq Epi: x / Bacteria: x        CAPILLARY BLOOD GLUCOSE            RADIOLOGY & ADDITIONAL TESTS:  Results Reviewed:   Imaging Personally Reviewed:  Electrocardiogram Personally Reviewed:         SUBJECTIVE / OVERNIGHT EVENTS: Seen and examined. Used  Marge. feels well. Awaiting MRI abdomen. Denies chest pain, SOB, abd pain, N/V, fever, chills, dysuria or any other complaints. All remainder ROS negative.     MEDICATIONS  (STANDING):  acetaminophen   IVPB .. 1000 milliGRAM(s) IV Intermittent once  calcitriol   Capsule 0.25 MICROGram(s) Oral daily  calcium carbonate    500 mG (Tums) Chewable 1 Tablet(s) Chew two times a day  epoetin dev (PROCRIT) Injectable 87686 Unit(s) SubCutaneous every 7 days  fludroCORTISONE 0.1 milliGRAM(s) Oral daily  folic acid 1 milliGRAM(s) Oral daily  levothyroxine 25 MICROGram(s) Oral daily  midodrine. 10 milliGRAM(s) Oral once  multivitamin 1 Tablet(s) Oral daily  pantoprazole    Tablet 40 milliGRAM(s) Oral before breakfast  sodium bicarbonate 650 milliGRAM(s) Oral three times a day  sodium chloride 0.9% lock flush 3 milliLiter(s) IV Push every 8 hours    MEDICATIONS  (PRN):  acetaminophen     Tablet .. 650 milliGRAM(s) Oral every 6 hours PRN Temp greater or equal to 38C (100.4F), Mild Pain (1 - 3)  albuterol    90 MICROgram(s) HFA Inhaler 2 Puff(s) Inhalation every 6 hours PRN Shortness of Breath and/or Wheezing  aluminum hydroxide/magnesium hydroxide/simethicone Suspension 30 milliLiter(s) Oral every 4 hours PRN Dyspepsia  guaiFENesin Oral Liquid (Sugar-Free) 100 milliGRAM(s) Oral every 6 hours PRN Cough  melatonin 3 milliGRAM(s) Oral at bedtime PRN Insomnia  ondansetron Injectable 4 milliGRAM(s) IV Push every 8 hours PRN Nausea and/or Vomiting        I&O's Summary    08 Jan 2024 07:01  -  09 Jan 2024 07:00  --------------------------------------------------------  IN: 1400 mL / OUT: 0 mL / NET: 1400 mL    09 Jan 2024 07:01  -  09 Jan 2024 10:54  --------------------------------------------------------  IN: 400 mL / OUT: 0 mL / NET: 400 mL        PHYSICAL EXAM:  Vital Signs Last 24 Hrs  T(C): 36.9 (09 Jan 2024 10:31), Max: 37 (08 Jan 2024 17:02)  T(F): 98.4 (09 Jan 2024 10:31), Max: 98.6 (08 Jan 2024 17:02)  HR: 67 (09 Jan 2024 10:31) (67 - 73)  BP: 111/67 (09 Jan 2024 10:31) (107/62 - 122/77)  BP(mean): --  RR: 18 (09 Jan 2024 10:31) (17 - 18)  SpO2: 98% (09 Jan 2024 10:31) (94% - 99%)    Parameters below as of 09 Jan 2024 05:45  Patient On (Oxygen Delivery Method): room air          General: Young female laying in bed comfortably. No acute distress  HEENT: EOMI. Clear conjunctivae. Moist mucus membrane  Neck: Supple.   Chest: CTA bilaterally. No wheezing, rales or rhonchi.   Heart: Normal S1 & S2. RRR.   Abdomen: Non distended. Soft. Non-tender. + BS  Ext: No pedal edema. No calf tenderness   Neuro: AAO x 3. No focal deficit. No speech disorder  Skin: Warm and Dry  Psychiatry: Normal mood and affect    LABS:                        8.0    8.50  )-----------( 211      ( 09 Jan 2024 05:15 )             26.5     01-09    144  |  107  |  25.9<H>  ----------------------------<  80  3.8   |  26.0  |  3.39<H>    Ca    8.7      09 Jan 2024 05:15  Phos  2.9     01-08  Mg     1.9     01-08    TPro  5.9<L>  /  Alb  3.3  /  TBili  <0.2<L>  /  DBili  x   /  AST  38<H>  /  ALT  97<H>  /  AlkPhos  56  01-09          Urinalysis Basic - ( 09 Jan 2024 05:15 )    Color: x / Appearance: x / SG: x / pH: x  Gluc: 80 mg/dL / Ketone: x  / Bili: x / Urobili: x   Blood: x / Protein: x / Nitrite: x   Leuk Esterase: x / RBC: x / WBC x   Sq Epi: x / Non Sq Epi: x / Bacteria: x        CAPILLARY BLOOD GLUCOSE            RADIOLOGY & ADDITIONAL TESTS:  Results Reviewed:   Imaging Personally Reviewed:  Electrocardiogram Personally Reviewed:

## 2024-01-09 NOTE — PROGRESS NOTE ADULT - ASSESSMENT
CKD(III/IV): reported baseline of 2.5  +COVID  MARIVEL (?) COVID nephrotoxicity (?) ATN due to hypotension---> improved yesterday  M.A.   Hypokalemia  CT scan: Atrophic kidneys with bilateral indeterminate renal masses  - cont to avoid potential nephrotoxins  - cont NaHCO3 at adjusted dose  -  MRI abdomen pending  - follow labs    Hypotension: r/o adrenal insufficiency  - Hydrocortisone now weaned off--> Endocrine noted; ACTH stim test to r/o adrenal insufficiency  - cont Midodrine    RO: iPTH 73  - cont CaCO3 and Rocaltrol  - trend labs    Anemia: +CKD + low Fe  - cont MARY and IV Fe  - target Hgb > 10.0   CKD(III/IV): reported baseline of 2.5  +COVID  MARIVEL (?) COVID nephrotoxicity (?) ATN due to hypotension---> resolving  M.A.   Hypokalemia  CT scan: Atrophic kidneys with bilateral indeterminate renal masses  - cont to avoid potential nephrotoxins  - cont NaHCO3 at adjusted dose  -  MRI abdomen pending  - follow labs    Hypotension: r/o adrenal insufficiency  - Hydrocortisone now weaned off--> Endocrine noted; ACTH stim test to r/o adrenal insufficiency  - cont Midodrine    RO: iPTH 73  - cont CaCO3 and Rocaltrol  - trend labs    Anemia: +CKD + low Fe  - cont MARY and IV Fe  - target Hgb > 10.0

## 2024-01-09 NOTE — CHART NOTE - NSCHARTNOTEFT_GEN_A_CORE
Source: Patient [ x]  Family [ ]   other [ ]  Cookie, ID #[id number]     Current Diet:   Diet, Renal Restrictions:   For patients receiving Renal Replacement - No Protein Restr, No Conc K, No Conc Phos, Low Sodium (01-02-24 @ 07:47)    Patient reports [ ] nausea  [ ] vomiting [ ] diarrhea [ ] constipation  [ ]chewing problems [ ] swallowing issues  [ ] other:     PO intake:  < 50% [ ]   50-75%  [x ]   %  [ ]  other :    Source for PO intake [x] Patient [ ] family [x ] chart [x ] staff [ ] other    Current Weight:   (1/9)  150 lbs RD bed scale weight   (1/2)  126 lbs     % Weight Change: Pt reports UBW 145lbs, visually appears more accurate    Pertinent Medications: MEDICATIONS  (STANDING):  calcitriol   Capsule 0.25 MICROGram(s) Oral daily  calcium carbonate    500 mG (Tums) Chewable 1 Tablet(s) Chew two times a day  epoetin dev (PROCRIT) Injectable 14337 Unit(s) SubCutaneous every 7 days  folic acid 1 milliGRAM(s) Oral daily  levothyroxine 25 MICROGram(s) Oral daily  multivitamin 1 Tablet(s) Oral daily  pantoprazole    Tablet 40 milliGRAM(s) Oral before breakfast  sodium bicarbonate 650 milliGRAM(s) Oral three times a day    Pertinent Labs: CBC Full  -  ( 09 Jan 2024 05:15 )  WBC Count : 8.50 K/uL  RBC Count : 2.89 M/uL  Hemoglobin : 8.0 g/dL  Hematocrit : 26.5 %  Platelet Count - Automated : 211 K/uL  Mean Cell Volume : 91.7 fl  Mean Cell Hemoglobin : 27.7 pg  Mean Cell Hemoglobin Concentration : 30.2 gm/dL  Auto Neutrophil # : 3.92 K/uL  Auto Lymphocyte # : 3.47 K/uL  Auto Monocyte # : 0.73 K/uL  Auto Eosinophil # : 0.23 K/uL  Auto Basophil # : 0.03 K/uL  Auto Neutrophil % : 46.1 %  Auto Lymphocyte % : 40.8 %  Auto Monocyte % : 8.6 %  Auto Eosinophil % : 2.7 %  Auto Basophil % : 0.4 %  Source: Patient [ ]  Family [ ]   other [ ]    Current Diet:     Patient reports [ ] nausea  [ ] vomiting [ ] diarrhea [ ] constipation  [ ]chewing problems [ ] swallowing issues  [ ] other:     PO intake:  < 50% [ ]   50-75%  [ ]   %  [ ]  other :    Source for PO intake [ ] Patient [ ] family [ ] chart [ ] staff [ ] other    Enteral /Parenteral Nutrition:     Current Weight:     % Weight Change     Pertinent Medications: MEDICATIONS  (STANDING):  acetaminophen   IVPB .. 1000 milliGRAM(s) IV Intermittent once  calcitriol   Capsule 0.25 MICROGram(s) Oral daily  calcium carbonate    500 mG (Tums) Chewable 1 Tablet(s) Chew two times a day  epoetin dev (PROCRIT) Injectable 44490 Unit(s) SubCutaneous every 7 days  fludroCORTISONE 0.1 milliGRAM(s) Oral daily  folic acid 1 milliGRAM(s) Oral daily  levothyroxine 25 MICROGram(s) Oral daily  multivitamin 1 Tablet(s) Oral daily  pantoprazole    Tablet 40 milliGRAM(s) Oral before breakfast  sodium bicarbonate 650 milliGRAM(s) Oral three times a day  sodium chloride 0.9% lock flush 3 milliLiter(s) IV Push every 8 hours    MEDICATIONS  (PRN):  acetaminophen     Tablet .. 650 milliGRAM(s) Oral every 6 hours PRN Temp greater or equal to 38C (100.4F), Mild Pain (1 - 3)  albuterol    90 MICROgram(s) HFA Inhaler 2 Puff(s) Inhalation every 6 hours PRN Shortness of Breath and/or Wheezing  aluminum hydroxide/magnesium hydroxide/simethicone Suspension 30 milliLiter(s) Oral every 4 hours PRN Dyspepsia  guaiFENesin Oral Liquid (Sugar-Free) 100 milliGRAM(s) Oral every 6 hours PRN Cough  melatonin 3 milliGRAM(s) Oral at bedtime PRN Insomnia  ondansetron Injectable 4 milliGRAM(s) IV Push every 8 hours PRN Nausea and/or Vomiting    Pertinent Labs: CBC Full  -  ( 09 Jan 2024 05:15 )  WBC Count : 8.50 K/uL  RBC Count : 2.89 M/uL  Hemoglobin : 8.0 g/dL  Hematocrit : 26.5 %  Platelet Count - Automated : 211 K/uL  Mean Cell Volume : 91.7 fl  Mean Cell Hemoglobin : 27.7 pg  Mean Cell Hemoglobin Concentration : 30.2 gm/dL  Auto Neutrophil # : 3.92 K/uL  Auto Lymphocyte # : 3.47 K/uL  Auto Monocyte # : 0.73 K/uL  Auto Eosinophil # : 0.23 K/uL  Auto Basophil # : 0.03 K/uL  Auto Neutrophil % : 46.1 %  Auto Lymphocyte % : 40.8 %  Auto Monocyte % : 8.6 %  Auto Eosinophil % : 2.7 %  Auto Basophil % : 0.4 %  01-09 Na144 mmol/L Glu 80 mg/dL K+ 3.8 mmol/L Cr  3.39 mg/dL<H> BUN 25.9 mg/dL<H> Phos n/a   Alb 3.3 g/dL PAB n/a       Skin: No skin breakdown/edema noted     Nutrition focused physical exam conducted - found signs of malnutrition [x]absent [ ]present    Subcutaneous fat loss: [ ] Orbital fat pads region, [ ]Buccal fat region, [ ]Triceps region,  [ ]Ribs region    Muscle wasting: [ ]Temples region, [ ]Clavicle region, [ ]Shoulder region, [ ]Scapula region, [ ]Interosseous region,  [ ]thigh region, [ ]Calf region    Estimated Needs:   [x] no change since previous assessment  [ ] recalculated:     Current Nutrition Diagnosis: Pt remains at nutrition risk secondary to inadequate energy intake related to dietary and taste preferences as evidenced by completing ~50% of meals. Interview conducted with assistance from  Cookie ID#[id number]. Pt reports having decreased intake in house 2/2 taste and dietary preferences, not liking the foods in house. Encouraged Pt to utilize facility room service to place her meal orders, menu and information provided. Briefly discussed importance of adequate nutrition, emphasis on protein intake. Pt receptive and understanding, RD to remain available.     Recommendations:   1) Consider liberalize to low Na diet to increase menu options and PO intake   2) Consider add Ensure BID   3) Continue MVI, folic acid supplementation  4) Encourage HBV protein sources     Monitoring and Evaluation:   [x] PO intake [ ] Tolerance to diet prescription [X] Weights  [X] Follow up per protocol [X] Labs: Source: Patient [ x]  Family [ ]   other [ ]  Cookie, ID #[id number]     Current Diet:   Diet, Renal Restrictions:   For patients receiving Renal Replacement - No Protein Restr, No Conc K, No Conc Phos, Low Sodium (01-02-24 @ 07:47)    Patient reports [ ] nausea  [ ] vomiting [ ] diarrhea [ ] constipation  [ ]chewing problems [ ] swallowing issues  [ ] other:     PO intake:  < 50% [ ]   50-75%  [x ]   %  [ ]  other :    Source for PO intake [x] Patient [ ] family [x ] chart [x ] staff [ ] other    Current Weight:   (1/9)  150 lbs RD bed scale weight   (1/2)  126 lbs     % Weight Change: Pt reports UBW 145lbs, visually appears more accurate    Pertinent Medications: MEDICATIONS  (STANDING):  calcitriol   Capsule 0.25 MICROGram(s) Oral daily  calcium carbonate    500 mG (Tums) Chewable 1 Tablet(s) Chew two times a day  epoetin dev (PROCRIT) Injectable 27583 Unit(s) SubCutaneous every 7 days  folic acid 1 milliGRAM(s) Oral daily  levothyroxine 25 MICROGram(s) Oral daily  multivitamin 1 Tablet(s) Oral daily  pantoprazole    Tablet 40 milliGRAM(s) Oral before breakfast  sodium bicarbonate 650 milliGRAM(s) Oral three times a day    Pertinent Labs: CBC Full  -  ( 09 Jan 2024 05:15 )  WBC Count : 8.50 K/uL  RBC Count : 2.89 M/uL  Hemoglobin : 8.0 g/dL  Hematocrit : 26.5 %  Platelet Count - Automated : 211 K/uL  Mean Cell Volume : 91.7 fl  Mean Cell Hemoglobin : 27.7 pg  Mean Cell Hemoglobin Concentration : 30.2 gm/dL  Auto Neutrophil # : 3.92 K/uL  Auto Lymphocyte # : 3.47 K/uL  Auto Monocyte # : 0.73 K/uL  Auto Eosinophil # : 0.23 K/uL  Auto Basophil # : 0.03 K/uL  Auto Neutrophil % : 46.1 %  Auto Lymphocyte % : 40.8 %  Auto Monocyte % : 8.6 %  Auto Eosinophil % : 2.7 %  Auto Basophil % : 0.4 %  Source: Patient [ ]  Family [ ]   other [ ]    Current Diet:     Patient reports [ ] nausea  [ ] vomiting [ ] diarrhea [ ] constipation  [ ]chewing problems [ ] swallowing issues  [ ] other:     PO intake:  < 50% [ ]   50-75%  [ ]   %  [ ]  other :    Source for PO intake [ ] Patient [ ] family [ ] chart [ ] staff [ ] other    Enteral /Parenteral Nutrition:     Current Weight:     % Weight Change     Pertinent Medications: MEDICATIONS  (STANDING):  acetaminophen   IVPB .. 1000 milliGRAM(s) IV Intermittent once  calcitriol   Capsule 0.25 MICROGram(s) Oral daily  calcium carbonate    500 mG (Tums) Chewable 1 Tablet(s) Chew two times a day  epoetin dev (PROCRIT) Injectable 05023 Unit(s) SubCutaneous every 7 days  fludroCORTISONE 0.1 milliGRAM(s) Oral daily  folic acid 1 milliGRAM(s) Oral daily  levothyroxine 25 MICROGram(s) Oral daily  multivitamin 1 Tablet(s) Oral daily  pantoprazole    Tablet 40 milliGRAM(s) Oral before breakfast  sodium bicarbonate 650 milliGRAM(s) Oral three times a day  sodium chloride 0.9% lock flush 3 milliLiter(s) IV Push every 8 hours    MEDICATIONS  (PRN):  acetaminophen     Tablet .. 650 milliGRAM(s) Oral every 6 hours PRN Temp greater or equal to 38C (100.4F), Mild Pain (1 - 3)  albuterol    90 MICROgram(s) HFA Inhaler 2 Puff(s) Inhalation every 6 hours PRN Shortness of Breath and/or Wheezing  aluminum hydroxide/magnesium hydroxide/simethicone Suspension 30 milliLiter(s) Oral every 4 hours PRN Dyspepsia  guaiFENesin Oral Liquid (Sugar-Free) 100 milliGRAM(s) Oral every 6 hours PRN Cough  melatonin 3 milliGRAM(s) Oral at bedtime PRN Insomnia  ondansetron Injectable 4 milliGRAM(s) IV Push every 8 hours PRN Nausea and/or Vomiting    Pertinent Labs: CBC Full  -  ( 09 Jan 2024 05:15 )  WBC Count : 8.50 K/uL  RBC Count : 2.89 M/uL  Hemoglobin : 8.0 g/dL  Hematocrit : 26.5 %  Platelet Count - Automated : 211 K/uL  Mean Cell Volume : 91.7 fl  Mean Cell Hemoglobin : 27.7 pg  Mean Cell Hemoglobin Concentration : 30.2 gm/dL  Auto Neutrophil # : 3.92 K/uL  Auto Lymphocyte # : 3.47 K/uL  Auto Monocyte # : 0.73 K/uL  Auto Eosinophil # : 0.23 K/uL  Auto Basophil # : 0.03 K/uL  Auto Neutrophil % : 46.1 %  Auto Lymphocyte % : 40.8 %  Auto Monocyte % : 8.6 %  Auto Eosinophil % : 2.7 %  Auto Basophil % : 0.4 %  01-09 Na144 mmol/L Glu 80 mg/dL K+ 3.8 mmol/L Cr  3.39 mg/dL<H> BUN 25.9 mg/dL<H> Phos n/a   Alb 3.3 g/dL PAB n/a       Skin: No skin breakdown/edema noted     Nutrition focused physical exam conducted - found signs of malnutrition [x]absent [ ]present    Subcutaneous fat loss: [ ] Orbital fat pads region, [ ]Buccal fat region, [ ]Triceps region,  [ ]Ribs region    Muscle wasting: [ ]Temples region, [ ]Clavicle region, [ ]Shoulder region, [ ]Scapula region, [ ]Interosseous region,  [ ]thigh region, [ ]Calf region    Estimated Needs:   [x] no change since previous assessment  [ ] recalculated:     Current Nutrition Diagnosis: Pt remains at nutrition risk secondary to inadequate energy intake related to dietary and taste preferences as evidenced by completing ~50% of meals. Interview conducted with assistance from  Cookie ID#[id number]. Pt reports having decreased intake in house 2/2 taste and dietary preferences, not liking the foods in house. Encouraged Pt to utilize facility room service to place her meal orders, menu and information provided. Briefly discussed importance of adequate nutrition, emphasis on protein intake. Pt receptive and understanding, RD to remain available.     Recommendations:   1) Consider liberalize to low Na diet to increase menu options and PO intake   2) Consider add Ensure BID   3) Continue MVI, folic acid supplementation  4) Encourage HBV protein sources     Monitoring and Evaluation:   [x] PO intake [ ] Tolerance to diet prescription [X] Weights  [X] Follow up per protocol [X] Labs:

## 2024-01-09 NOTE — PROGRESS NOTE ADULT - ASSESSMENT
INCOMPLETE 23F Czech speaking, w/ hypothyroidism and ckd brought in for AMS at a [employer] event. Patient was apparently in her normal state of health at a [employer] event and shortly after eating rice/beans and meat started c/o abdominal pain and cramping and then vomited. Friends who witnessed it said they called EMS due to patient being very agitated. No hx of drug use. She has a hx of "kidney problems" and used to be on medications for it but self discontinued " a while ago." In the field Patient given Versed and then Ativan in ER to allow for exam and labs. Labs with Hbg of 8.6 and Cr. of 3.95 with reported baseline of 2.5. No reported mental health history. Admitted for acute metabolic encephalopathy with MARIVEL on CKD. Found to have COVID. Course complicated by high grade fever, hypotension. Consult for adrenal insufficiency    1. Hypotension- BP soft but stable  - Initial AM cortisol not accurate because pt was already on Hydrocortisone  - Hydrocortisone last dose was 1/6 am  - Cortisol from today is low, 4  - ACTH stimulation test acceptable, can continue to monitor    2. Hypothyroidism  - TSH 3.55, FT4 1.1  - TPO ab negative  - Thyroid ultrasound is normal  - Continue with home dose LT4 25 mcg 23F Brazilian speaking, w/ hypothyroidism and ckd brought in for AMS at a [employer] event. Patient was apparently in her normal state of health at a [employer] event and shortly after eating rice/beans and meat started c/o abdominal pain and cramping and then vomited. Friends who witnessed it said they called EMS due to patient being very agitated. No hx of drug use. She has a hx of "kidney problems" and used to be on medications for it but self discontinued " a while ago." In the field Patient given Versed and then Ativan in ER to allow for exam and labs. Labs with Hbg of 8.6 and Cr. of 3.95 with reported baseline of 2.5. No reported mental health history. Admitted for acute metabolic encephalopathy with MARIVEL on CKD. Found to have COVID. Course complicated by high grade fever, hypotension. Consult for adrenal insufficiency    1. Hypotension- BP soft but stable  - Initial AM cortisol not accurate because pt was already on Hydrocortisone  - Hydrocortisone last dose was 1/6 am  - Cortisol from today is low, 4  - ACTH stimulation test acceptable, can continue to monitor    2. Hypothyroidism  - TSH 3.55, FT4 1.1  - TPO ab negative  - Thyroid ultrasound is normal  - Continue with home dose LT4 25 mcg

## 2024-01-10 LAB
ALBUMIN SERPL ELPH-MCNC: 3.2 G/DL — LOW (ref 3.3–5.2)
ALBUMIN SERPL ELPH-MCNC: 3.2 G/DL — LOW (ref 3.3–5.2)
ALP SERPL-CCNC: 54 U/L — SIGNIFICANT CHANGE UP (ref 40–120)
ALP SERPL-CCNC: 54 U/L — SIGNIFICANT CHANGE UP (ref 40–120)
ALT FLD-CCNC: 76 U/L — HIGH
ALT FLD-CCNC: 76 U/L — HIGH
ANION GAP SERPL CALC-SCNC: 14 MMOL/L — SIGNIFICANT CHANGE UP (ref 5–17)
ANION GAP SERPL CALC-SCNC: 14 MMOL/L — SIGNIFICANT CHANGE UP (ref 5–17)
AST SERPL-CCNC: 27 U/L — SIGNIFICANT CHANGE UP
AST SERPL-CCNC: 27 U/L — SIGNIFICANT CHANGE UP
BILIRUB SERPL-MCNC: 0.2 MG/DL — LOW (ref 0.4–2)
BILIRUB SERPL-MCNC: 0.2 MG/DL — LOW (ref 0.4–2)
BUN SERPL-MCNC: 25.5 MG/DL — HIGH (ref 8–20)
BUN SERPL-MCNC: 25.5 MG/DL — HIGH (ref 8–20)
CALCIUM SERPL-MCNC: 8.4 MG/DL — SIGNIFICANT CHANGE UP (ref 8.4–10.5)
CALCIUM SERPL-MCNC: 8.4 MG/DL — SIGNIFICANT CHANGE UP (ref 8.4–10.5)
CHLORIDE SERPL-SCNC: 103 MMOL/L — SIGNIFICANT CHANGE UP (ref 96–108)
CHLORIDE SERPL-SCNC: 103 MMOL/L — SIGNIFICANT CHANGE UP (ref 96–108)
CO2 SERPL-SCNC: 24 MMOL/L — SIGNIFICANT CHANGE UP (ref 22–29)
CO2 SERPL-SCNC: 24 MMOL/L — SIGNIFICANT CHANGE UP (ref 22–29)
CREAT SERPL-MCNC: 3.4 MG/DL — HIGH (ref 0.5–1.3)
CREAT SERPL-MCNC: 3.4 MG/DL — HIGH (ref 0.5–1.3)
EGFR: 19 ML/MIN/1.73M2 — LOW
EGFR: 19 ML/MIN/1.73M2 — LOW
GLUCOSE SERPL-MCNC: 78 MG/DL — SIGNIFICANT CHANGE UP (ref 70–99)
GLUCOSE SERPL-MCNC: 78 MG/DL — SIGNIFICANT CHANGE UP (ref 70–99)
HCT VFR BLD CALC: 27.1 % — LOW (ref 34.5–45)
HCT VFR BLD CALC: 27.1 % — LOW (ref 34.5–45)
HGB BLD-MCNC: 8.1 G/DL — LOW (ref 11.5–15.5)
HGB BLD-MCNC: 8.1 G/DL — LOW (ref 11.5–15.5)
MCHC RBC-ENTMCNC: 27.6 PG — SIGNIFICANT CHANGE UP (ref 27–34)
MCHC RBC-ENTMCNC: 27.6 PG — SIGNIFICANT CHANGE UP (ref 27–34)
MCHC RBC-ENTMCNC: 29.9 GM/DL — LOW (ref 32–36)
MCHC RBC-ENTMCNC: 29.9 GM/DL — LOW (ref 32–36)
MCV RBC AUTO: 92.5 FL — SIGNIFICANT CHANGE UP (ref 80–100)
MCV RBC AUTO: 92.5 FL — SIGNIFICANT CHANGE UP (ref 80–100)
PLATELET # BLD AUTO: 210 K/UL — SIGNIFICANT CHANGE UP (ref 150–400)
PLATELET # BLD AUTO: 210 K/UL — SIGNIFICANT CHANGE UP (ref 150–400)
POTASSIUM SERPL-MCNC: 3.6 MMOL/L — SIGNIFICANT CHANGE UP (ref 3.5–5.3)
POTASSIUM SERPL-MCNC: 3.6 MMOL/L — SIGNIFICANT CHANGE UP (ref 3.5–5.3)
POTASSIUM SERPL-SCNC: 3.6 MMOL/L — SIGNIFICANT CHANGE UP (ref 3.5–5.3)
POTASSIUM SERPL-SCNC: 3.6 MMOL/L — SIGNIFICANT CHANGE UP (ref 3.5–5.3)
PROT SERPL-MCNC: 5.7 G/DL — LOW (ref 6.6–8.7)
PROT SERPL-MCNC: 5.7 G/DL — LOW (ref 6.6–8.7)
RBC # BLD: 2.93 M/UL — LOW (ref 3.8–5.2)
RBC # BLD: 2.93 M/UL — LOW (ref 3.8–5.2)
RBC # FLD: 18 % — HIGH (ref 10.3–14.5)
RBC # FLD: 18 % — HIGH (ref 10.3–14.5)
SODIUM SERPL-SCNC: 141 MMOL/L — SIGNIFICANT CHANGE UP (ref 135–145)
SODIUM SERPL-SCNC: 141 MMOL/L — SIGNIFICANT CHANGE UP (ref 135–145)
WBC # BLD: 9.73 K/UL — SIGNIFICANT CHANGE UP (ref 3.8–10.5)
WBC # BLD: 9.73 K/UL — SIGNIFICANT CHANGE UP (ref 3.8–10.5)
WBC # FLD AUTO: 9.73 K/UL — SIGNIFICANT CHANGE UP (ref 3.8–10.5)
WBC # FLD AUTO: 9.73 K/UL — SIGNIFICANT CHANGE UP (ref 3.8–10.5)

## 2024-01-10 PROCEDURE — 99233 SBSQ HOSP IP/OBS HIGH 50: CPT

## 2024-01-10 RX ADMIN — Medication 650 MILLIGRAM(S): at 05:09

## 2024-01-10 RX ADMIN — Medication 1 TABLET(S): at 05:10

## 2024-01-10 RX ADMIN — SODIUM CHLORIDE 3 MILLILITER(S): 9 INJECTION INTRAMUSCULAR; INTRAVENOUS; SUBCUTANEOUS at 06:31

## 2024-01-10 RX ADMIN — Medication 25 MICROGRAM(S): at 05:09

## 2024-01-10 RX ADMIN — CALCITRIOL 0.25 MICROGRAM(S): 0.5 CAPSULE ORAL at 08:49

## 2024-01-10 RX ADMIN — Medication 1 TABLET(S): at 08:49

## 2024-01-10 RX ADMIN — Medication 650 MILLIGRAM(S): at 23:18

## 2024-01-10 RX ADMIN — FLUDROCORTISONE ACETATE 0.1 MILLIGRAM(S): 0.1 TABLET ORAL at 05:10

## 2024-01-10 RX ADMIN — Medication 1 TABLET(S): at 16:15

## 2024-01-10 RX ADMIN — Medication 1 MILLIGRAM(S): at 08:49

## 2024-01-10 RX ADMIN — SODIUM CHLORIDE 3 MILLILITER(S): 9 INJECTION INTRAMUSCULAR; INTRAVENOUS; SUBCUTANEOUS at 22:32

## 2024-01-10 RX ADMIN — Medication 650 MILLIGRAM(S): at 13:00

## 2024-01-10 RX ADMIN — SODIUM CHLORIDE 3 MILLILITER(S): 9 INJECTION INTRAMUSCULAR; INTRAVENOUS; SUBCUTANEOUS at 10:27

## 2024-01-10 RX ADMIN — ERYTHROPOIETIN 20000 UNIT(S): 10000 INJECTION, SOLUTION INTRAVENOUS; SUBCUTANEOUS at 11:52

## 2024-01-10 RX ADMIN — PANTOPRAZOLE SODIUM 40 MILLIGRAM(S): 20 TABLET, DELAYED RELEASE ORAL at 05:09

## 2024-01-10 NOTE — PROGRESS NOTE ADULT - NS ATTEND AMEND GEN_ALL_CORE FT
I agree with plan above and discussed with NP.  will cont to monitor. Pt not acutely sick. TFts normal.

## 2024-01-10 NOTE — PROGRESS NOTE ADULT - ASSESSMENT
23F [place] speaking, w/ hypothyroidism and ckd brought in for AMS at a [employer]az[employer]ism event. Patient was apparently in her normal state of health at a Hinduism event and shortly after eating rice/beans and meat started c/o abdominal pain and cramping and then vomited. Friends who witnessed it said they called EMS due to patient being very agitated. No hx of drug use. She has a hx of "kidney problems" and used to be on medications for it but self discontinued " a while ago." In the field Patient given Versed and then Ativan in ER to allow for exam and labs. Labs with Hbg of 8.6 and Cr. of 3.95 with reported baseline of 2.5. No reported mental health history. Admitted for acute metabolic encephalopathy with MARIVEL on CKD. Found to have COVID. Course complicated by high grade fever, hypotension. Consult for adrenal insufficiency    1. Hypotension- BP stable, on Florinef  - Initial AM cortisol not accurate because pt was already on Hydrocortisone  - Hydrocortisone last dose was 1/6 am  - Cortisol from today is low, 4  - ACTH stimulation test acceptable, can continue to monitor    2. Hypothyroidism  - TSH 3.55, FT4 1.1  - TPO ab negative  - Thyroid ultrasound is normal  - Continue with home dose LT4 25 mcg 23F [place] speaking, w/ hypothyroidism and ckd brought in for AMS at a Sabianist event. Patient was apparently in her normal state of health at a Gnosticism event and shortly after eating rice/beans and meat started c/o abdominal pain and cramping and then vomited. Friends who witnessed it said they called EMS due to patient being very agitated. No hx of drug use. She has a hx of "kidney problems" and used to be on medications for it but self discontinued " a while ago." In the field Patient given Versed and then Ativan in ER to allow for exam and labs. Labs with Hbg of 8.6 and Cr. of 3.95 with reported baseline of 2.5. No reported mental health history. Admitted for acute metabolic encephalopathy with MARIVEL on CKD. Found to have COVID. Course complicated by high grade fever, hypotension. Consult for adrenal insufficiency    1. Hypotension- BP stable, on Florinef  - Initial AM cortisol not accurate because pt was already on Hydrocortisone  - Hydrocortisone last dose was 1/6 am  - Cortisol from today is low, 4  - ACTH stimulation test acceptable, can continue to monitor    2. Hypothyroidism  - TSH 3.55, FT4 1.1  - TPO ab negative  - Thyroid ultrasound is normal  - Continue with home dose LT4 25 mcg

## 2024-01-10 NOTE — PROGRESS NOTE ADULT - SUBJECTIVE AND OBJECTIVE BOX
INTERVAL EVENTS:  follow up for thyroid management  translation ID# [id number]    ROS: Denies chest pain or sob. Endorses good appetite. Feeling better.    MEDICATIONS  (STANDING):  acetaminophen   IVPB .. 1000 milliGRAM(s) IV Intermittent once  calcitriol   Capsule 0.25 MICROGram(s) Oral daily  calcium carbonate    500 mG (Tums) Chewable 1 Tablet(s) Chew two times a day  epoetin dev (PROCRIT) Injectable 38053 Unit(s) SubCutaneous every 7 days  fludroCORTISONE 0.1 milliGRAM(s) Oral daily  folic acid 1 milliGRAM(s) Oral daily  levothyroxine 25 MICROGram(s) Oral daily  multivitamin 1 Tablet(s) Oral daily  pantoprazole    Tablet 40 milliGRAM(s) Oral before breakfast  sodium bicarbonate 650 milliGRAM(s) Oral three times a day  sodium chloride 0.9% lock flush 3 milliLiter(s) IV Push every 8 hours    MEDICATIONS  (PRN):  acetaminophen     Tablet .. 650 milliGRAM(s) Oral every 6 hours PRN Temp greater or equal to 38C (100.4F), Mild Pain (1 - 3)  albuterol    90 MICROgram(s) HFA Inhaler 2 Puff(s) Inhalation every 6 hours PRN Shortness of Breath and/or Wheezing  aluminum hydroxide/magnesium hydroxide/simethicone Suspension 30 milliLiter(s) Oral every 4 hours PRN Dyspepsia  guaiFENesin Oral Liquid (Sugar-Free) 100 milliGRAM(s) Oral every 6 hours PRN Cough  melatonin 3 milliGRAM(s) Oral at bedtime PRN Insomnia  ondansetron Injectable 4 milliGRAM(s) IV Push every 8 hours PRN Nausea and/or Vomiting      Allergies  No Known Allergies      Vital Signs Last 24 Hrs  T(C): 36.9 (10 Al 2024 04:19), Max: 36.9 (09 Jan 2024 10:31)  T(F): 98.5 (10 Al 2024 04:19), Max: 98.5 (10 Al 2024 04:19)  HR: 66 (10 Al 2024 04:19) (66 - 83)  BP: 112/72 (10 Al 2024 04:19) (105/66 - 112/72)  BP(mean): --  RR: 17 (10 Al 2024 04:19) (17 - 18)  SpO2: 95% (10 Al 2024 04:19) (95% - 98%)    Parameters below as of 10 Al 2024 04:19  Patient On (Oxygen Delivery Method): room air        PHYSICAL EXAM:  GENERAL: NAD, comfortable  NECK: Supple; trachea midline  CHEST/LUNG: Clear to auscultation bilaterally; No wheezes  HEART: Regular rate and rhythm; No murmurs, rubs, or gallops  ABDOMEN: Soft, Nontender, Nondistended; Bowel sounds present  NEURO: AAOx3, no tremor  EXTREMITIES:  2+ Peripheral Pulses, no edema        LABS:                        8.1    9.73  )-----------( 210      ( 10 Al 2024 05:25 )             27.1     01-10    141  |  103  |  25.5<H>  ----------------------------<  78  3.6   |  24.0  |  3.40<H>    Ca    8.4      10 Al 2024 05:25    TPro  5.7<L>  /  Alb  3.2<L>  /  TBili  0.2<L>  /  DBili  x   /  AST  27  /  ALT  76<H>  /  AlkPhos  54  01-10    Urinalysis Basic - ( 10 Al 2024 05:25 )    Color: x / Appearance: x / SG: x / pH: x  Gluc: 78 mg/dL / Ketone: x  / Bili: x / Urobili: x   Blood: x / Protein: x / Nitrite: x   Leuk Esterase: x / RBC: x / WBC x   Sq Epi: x / Non Sq Epi: x / Bacteria: x                Free Thyroxine, Serum: 1.1 ng/dL (01-05-24 @ 06:16)  Thyroid Stimulating Hormone, Serum: 3.55 uIU/mL (01-05-24 @ 06:16)  Thyroid Stimulating Hormone, Serum: 1.99 uIU/mL (01-03-24 @ 06:42)   Pt here for a lab draw ordered by Dr. Núñez. Lab draw successful.   INTERVAL EVENTS:  follow up for thyroid management  translation ID# [id number]    ROS: Denies chest pain or sob. Endorses good appetite. Feeling better.    MEDICATIONS  (STANDING):  acetaminophen   IVPB .. 1000 milliGRAM(s) IV Intermittent once  calcitriol   Capsule 0.25 MICROGram(s) Oral daily  calcium carbonate    500 mG (Tums) Chewable 1 Tablet(s) Chew two times a day  epoetin dev (PROCRIT) Injectable 84061 Unit(s) SubCutaneous every 7 days  fludroCORTISONE 0.1 milliGRAM(s) Oral daily  folic acid 1 milliGRAM(s) Oral daily  levothyroxine 25 MICROGram(s) Oral daily  multivitamin 1 Tablet(s) Oral daily  pantoprazole    Tablet 40 milliGRAM(s) Oral before breakfast  sodium bicarbonate 650 milliGRAM(s) Oral three times a day  sodium chloride 0.9% lock flush 3 milliLiter(s) IV Push every 8 hours    MEDICATIONS  (PRN):  acetaminophen     Tablet .. 650 milliGRAM(s) Oral every 6 hours PRN Temp greater or equal to 38C (100.4F), Mild Pain (1 - 3)  albuterol    90 MICROgram(s) HFA Inhaler 2 Puff(s) Inhalation every 6 hours PRN Shortness of Breath and/or Wheezing  aluminum hydroxide/magnesium hydroxide/simethicone Suspension 30 milliLiter(s) Oral every 4 hours PRN Dyspepsia  guaiFENesin Oral Liquid (Sugar-Free) 100 milliGRAM(s) Oral every 6 hours PRN Cough  melatonin 3 milliGRAM(s) Oral at bedtime PRN Insomnia  ondansetron Injectable 4 milliGRAM(s) IV Push every 8 hours PRN Nausea and/or Vomiting      Allergies  No Known Allergies      Vital Signs Last 24 Hrs  T(C): 36.9 (10 Al 2024 04:19), Max: 36.9 (09 Jan 2024 10:31)  T(F): 98.5 (10 Al 2024 04:19), Max: 98.5 (10 Al 2024 04:19)  HR: 66 (10 Al 2024 04:19) (66 - 83)  BP: 112/72 (10 Al 2024 04:19) (105/66 - 112/72)  BP(mean): --  RR: 17 (10 Al 2024 04:19) (17 - 18)  SpO2: 95% (10 Al 2024 04:19) (95% - 98%)    Parameters below as of 10 Al 2024 04:19  Patient On (Oxygen Delivery Method): room air        PHYSICAL EXAM:  GENERAL: NAD, comfortable  NECK: Supple; trachea midline  CHEST/LUNG: Clear to auscultation bilaterally; No wheezes  HEART: Regular rate and rhythm; No murmurs, rubs, or gallops  ABDOMEN: Soft, Nontender, Nondistended; Bowel sounds present  NEURO: AAOx3, no tremor  EXTREMITIES:  2+ Peripheral Pulses, no edema        LABS:                        8.1    9.73  )-----------( 210      ( 10 Al 2024 05:25 )             27.1     01-10    141  |  103  |  25.5<H>  ----------------------------<  78  3.6   |  24.0  |  3.40<H>    Ca    8.4      10 Al 2024 05:25    TPro  5.7<L>  /  Alb  3.2<L>  /  TBili  0.2<L>  /  DBili  x   /  AST  27  /  ALT  76<H>  /  AlkPhos  54  01-10    Urinalysis Basic - ( 10 Al 2024 05:25 )    Color: x / Appearance: x / SG: x / pH: x  Gluc: 78 mg/dL / Ketone: x  / Bili: x / Urobili: x   Blood: x / Protein: x / Nitrite: x   Leuk Esterase: x / RBC: x / WBC x   Sq Epi: x / Non Sq Epi: x / Bacteria: x                Free Thyroxine, Serum: 1.1 ng/dL (01-05-24 @ 06:16)  Thyroid Stimulating Hormone, Serum: 3.55 uIU/mL (01-05-24 @ 06:16)  Thyroid Stimulating Hormone, Serum: 1.99 uIU/mL (01-03-24 @ 06:42)

## 2024-01-10 NOTE — PROGRESS NOTE ADULT - ASSESSMENT
CKD(III/IV): reported baseline of 2.5  +COVID  MARIVEL (?) COVID nephrotoxicity (?) ATN due to hypotension  Renal function resolving likely at new baseline  M.A. --> better  Hypokalemia  CT scan: Atrophic kidneys with bilateral indeterminate renal masses  - cont to avoid potential nephrotoxins  - cont NaHCO3  -  MRI abdomen pending---> renal mass on CT scan, prefer to have study done prior to dc as pt has h/o poor compliance    Hypotension: r/o adrenal insufficiency  - Hydrocortisone now weaned off--> Endocrine noted; ACTH stim test to r/o adrenal insufficiency  - cont Florinef    RO: iPTH 73  - cont CaCO3 and Rocaltrol  - trend labs    Anemia: +CKD + low Fe  - cont MARY and IV Fe  - target Hgb > 10.0    AM labs will follow

## 2024-01-10 NOTE — PROGRESS NOTE ADULT - SUBJECTIVE AND OBJECTIVE BOX
NEPHROLOGY INTERVAL HPI/OVERNIGHT EVENTS:    Examined  Looks well feels fine no distress  Denies HA CP no SOB    MEDICATIONS  (STANDING):  acetaminophen   IVPB .. 1000 milliGRAM(s) IV Intermittent once  calcitriol   Capsule 0.25 MICROGram(s) Oral daily  calcium carbonate    500 mG (Tums) Chewable 1 Tablet(s) Chew two times a day  epoetin dev (PROCRIT) Injectable 07615 Unit(s) SubCutaneous every 7 days  fludroCORTISONE 0.1 milliGRAM(s) Oral daily  folic acid 1 milliGRAM(s) Oral daily  levothyroxine 25 MICROGram(s) Oral daily  multivitamin 1 Tablet(s) Oral daily  pantoprazole    Tablet 40 milliGRAM(s) Oral before breakfast  sodium bicarbonate 650 milliGRAM(s) Oral three times a day  sodium chloride 0.9% lock flush 3 milliLiter(s) IV Push every 8 hours    MEDICATIONS  (PRN):  acetaminophen     Tablet .. 650 milliGRAM(s) Oral every 6 hours PRN Temp greater or equal to 38C (100.4F), Mild Pain (1 - 3)  albuterol    90 MICROgram(s) HFA Inhaler 2 Puff(s) Inhalation every 6 hours PRN Shortness of Breath and/or Wheezing  aluminum hydroxide/magnesium hydroxide/simethicone Suspension 30 milliLiter(s) Oral every 4 hours PRN Dyspepsia  guaiFENesin Oral Liquid (Sugar-Free) 100 milliGRAM(s) Oral every 6 hours PRN Cough  melatonin 3 milliGRAM(s) Oral at bedtime PRN Insomnia  ondansetron Injectable 4 milliGRAM(s) IV Push every 8 hours PRN Nausea and/or Vomiting      Allergies    No Known Allergies    Intolerances        Vital Signs Last 24 Hrs  T(C): 36.6 (10 Al 2024 11:14), Max: 36.9 (10 Al 2024 04:19)  T(F): 97.8 (10 Al 2024 11:14), Max: 98.5 (10 Al 2024 04:19)  HR: 74 (10 Al 2024 11:14) (66 - 83)  BP: 110/69 (10 Al 2024 11:14) (105/66 - 112/72)  BP(mean): --  RR: 18 (10 Al 2024 11:14) (17 - 18)  SpO2: 99% (10 Al 2024 11:14) (95% - 99%)    Parameters below as of 10 Al 2024 04:19  Patient On (Oxygen Delivery Method): room air      PHYSICAL EXAM:  GENERAL: Comfortable  HEENT: No periorbital edema  NECK: Supple; no JVD  NERVOUS SYSTEM: Awake, alert  EXTREMITIES: no dependent edema  Further exam deferred to limit COVID exposure    LABS:                        8.1    9.73  )-----------( 210      ( 10 Al 2024 05:25 )             27.1     01-10    141  |  103  |  25.5<H>  ----------------------------<  78  3.6   |  24.0  |  3.40<H>    Ca    8.4      10 Al 2024 05:25    TPro  5.7<L>  /  Alb  3.2<L>  /  TBili  0.2<L>  /  DBili  x   /  AST  27  /  ALT  76<H>  /  AlkPhos  54  01-10      Urinalysis Basic - ( 10 Al 2024 05:25 )    Color: x / Appearance: x / SG: x / pH: x  Gluc: 78 mg/dL / Ketone: x  / Bili: x / Urobili: x   Blood: x / Protein: x / Nitrite: x   Leuk Esterase: x / RBC: x / WBC x   Sq Epi: x / Non Sq Epi: x / Bacteria: x              RADIOLOGY & ADDITIONAL TESTS:   NEPHROLOGY INTERVAL HPI/OVERNIGHT EVENTS:    Examined  Looks well feels fine no distress  Denies HA CP no SOB    MEDICATIONS  (STANDING):  acetaminophen   IVPB .. 1000 milliGRAM(s) IV Intermittent once  calcitriol   Capsule 0.25 MICROGram(s) Oral daily  calcium carbonate    500 mG (Tums) Chewable 1 Tablet(s) Chew two times a day  epoetin dev (PROCRIT) Injectable 76341 Unit(s) SubCutaneous every 7 days  fludroCORTISONE 0.1 milliGRAM(s) Oral daily  folic acid 1 milliGRAM(s) Oral daily  levothyroxine 25 MICROGram(s) Oral daily  multivitamin 1 Tablet(s) Oral daily  pantoprazole    Tablet 40 milliGRAM(s) Oral before breakfast  sodium bicarbonate 650 milliGRAM(s) Oral three times a day  sodium chloride 0.9% lock flush 3 milliLiter(s) IV Push every 8 hours    MEDICATIONS  (PRN):  acetaminophen     Tablet .. 650 milliGRAM(s) Oral every 6 hours PRN Temp greater or equal to 38C (100.4F), Mild Pain (1 - 3)  albuterol    90 MICROgram(s) HFA Inhaler 2 Puff(s) Inhalation every 6 hours PRN Shortness of Breath and/or Wheezing  aluminum hydroxide/magnesium hydroxide/simethicone Suspension 30 milliLiter(s) Oral every 4 hours PRN Dyspepsia  guaiFENesin Oral Liquid (Sugar-Free) 100 milliGRAM(s) Oral every 6 hours PRN Cough  melatonin 3 milliGRAM(s) Oral at bedtime PRN Insomnia  ondansetron Injectable 4 milliGRAM(s) IV Push every 8 hours PRN Nausea and/or Vomiting      Allergies    No Known Allergies    Intolerances        Vital Signs Last 24 Hrs  T(C): 36.6 (10 Al 2024 11:14), Max: 36.9 (10 Al 2024 04:19)  T(F): 97.8 (10 Al 2024 11:14), Max: 98.5 (10 Al 2024 04:19)  HR: 74 (10 Al 2024 11:14) (66 - 83)  BP: 110/69 (10 Al 2024 11:14) (105/66 - 112/72)  BP(mean): --  RR: 18 (10 Al 2024 11:14) (17 - 18)  SpO2: 99% (10 Al 2024 11:14) (95% - 99%)    Parameters below as of 10 Al 2024 04:19  Patient On (Oxygen Delivery Method): room air      PHYSICAL EXAM:  GENERAL: Comfortable  HEENT: No periorbital edema  NECK: Supple; no JVD  NERVOUS SYSTEM: Awake, alert  EXTREMITIES: no dependent edema  Further exam deferred to limit COVID exposure    LABS:                        8.1    9.73  )-----------( 210      ( 10 Al 2024 05:25 )             27.1     01-10    141  |  103  |  25.5<H>  ----------------------------<  78  3.6   |  24.0  |  3.40<H>    Ca    8.4      10 La 2024 05:25    TPro  5.7<L>  /  Alb  3.2<L>  /  TBili  0.2<L>  /  DBili  x   /  AST  27  /  ALT  76<H>  /  AlkPhos  54  01-10      Urinalysis Basic - ( 10 Al 2024 05:25 )    Color: x / Appearance: x / SG: x / pH: x  Gluc: 78 mg/dL / Ketone: x  / Bili: x / Urobili: x   Blood: x / Protein: x / Nitrite: x   Leuk Esterase: x / RBC: x / WBC x   Sq Epi: x / Non Sq Epi: x / Bacteria: x              RADIOLOGY & ADDITIONAL TESTS:

## 2024-01-10 NOTE — PROGRESS NOTE ADULT - SUBJECTIVE AND OBJECTIVE BOX
SUBJECTIVE / OVERNIGHT EVENTS: Seen and examined. Used  Marge. Wants to go home. informed her of pending MR and renal function stability. Denies chest pain, SOB, abd pain, N/V, fever, chills, dysuria or any other complaints. All remainder ROS negative.     MEDICATIONS  (STANDING):  acetaminophen   IVPB .. 1000 milliGRAM(s) IV Intermittent once  calcitriol   Capsule 0.25 MICROGram(s) Oral daily  calcium carbonate    500 mG (Tums) Chewable 1 Tablet(s) Chew two times a day  epoetin dev (PROCRIT) Injectable 41073 Unit(s) SubCutaneous every 7 days  fludroCORTISONE 0.1 milliGRAM(s) Oral daily  folic acid 1 milliGRAM(s) Oral daily  levothyroxine 25 MICROGram(s) Oral daily  multivitamin 1 Tablet(s) Oral daily  pantoprazole    Tablet 40 milliGRAM(s) Oral before breakfast  sodium bicarbonate 650 milliGRAM(s) Oral three times a day  sodium chloride 0.9% lock flush 3 milliLiter(s) IV Push every 8 hours    MEDICATIONS  (PRN):  acetaminophen     Tablet .. 650 milliGRAM(s) Oral every 6 hours PRN Temp greater or equal to 38C (100.4F), Mild Pain (1 - 3)  albuterol    90 MICROgram(s) HFA Inhaler 2 Puff(s) Inhalation every 6 hours PRN Shortness of Breath and/or Wheezing  aluminum hydroxide/magnesium hydroxide/simethicone Suspension 30 milliLiter(s) Oral every 4 hours PRN Dyspepsia  guaiFENesin Oral Liquid (Sugar-Free) 100 milliGRAM(s) Oral every 6 hours PRN Cough  melatonin 3 milliGRAM(s) Oral at bedtime PRN Insomnia  ondansetron Injectable 4 milliGRAM(s) IV Push every 8 hours PRN Nausea and/or Vomiting        I&O's Summary    09 Jan 2024 07:01  -  10 Al 2024 07:00  --------------------------------------------------------  IN: 1350 mL / OUT: 0 mL / NET: 1350 mL    10 Al 2024 07:01  -  10 Al 2024 11:14  --------------------------------------------------------  IN: 300 mL / OUT: 0 mL / NET: 300 mL        PHYSICAL EXAM:  Vital Signs Last 24 Hrs  T(C): 36.9 (10 Al 2024 04:19), Max: 36.9 (10 Al 2024 04:19)  T(F): 98.5 (10 Al 2024 04:19), Max: 98.5 (10 Al 2024 04:19)  HR: 66 (10 Al 2024 04:19) (66 - 83)  BP: 112/72 (10 Al 2024 04:19) (105/66 - 112/72)  BP(mean): --  RR: 17 (10 Al 2024 04:19) (17 - 18)  SpO2: 95% (10 Al 2024 04:19) (95% - 98%)    Parameters below as of 10 Al 2024 04:19  Patient On (Oxygen Delivery Method): room air          General: Young female laying in bed comfortably. No acute distress  HEENT: EOMI. Clear conjunctivae. Moist mucus membrane  Neck: Supple.   Chest: CTA bilaterally. No wheezing, rales or rhonchi.   Heart: Normal S1 & S2. RRR.   Abdomen: Non distended. Soft. Non-tender. + BS  Ext: No pedal edema. No calf tenderness   Neuro: AAO x 3. No focal deficit. No speech disorder  Skin: Warm and Dry  Psychiatry: Normal mood and affect    LABS:                        8.1    9.73  )-----------( 210      ( 10 Al 2024 05:25 )             27.1     01-10    141  |  103  |  25.5<H>  ----------------------------<  78  3.6   |  24.0  |  3.40<H>    Ca    8.4      10 Al 2024 05:25    TPro  5.7<L>  /  Alb  3.2<L>  /  TBili  0.2<L>  /  DBili  x   /  AST  27  /  ALT  76<H>  /  AlkPhos  54  01-10          Urinalysis Basic - ( 10 Al 2024 05:25 )    Color: x / Appearance: x / SG: x / pH: x  Gluc: 78 mg/dL / Ketone: x  / Bili: x / Urobili: x   Blood: x / Protein: x / Nitrite: x   Leuk Esterase: x / RBC: x / WBC x   Sq Epi: x / Non Sq Epi: x / Bacteria: x        CAPILLARY BLOOD GLUCOSE            RADIOLOGY & ADDITIONAL TESTS:  Results Reviewed:   Imaging Personally Reviewed:  Electrocardiogram Personally Reviewed:         SUBJECTIVE / OVERNIGHT EVENTS: Seen and examined. Used  Marge. Wants to go home. informed her of pending MR and renal function stability. Denies chest pain, SOB, abd pain, N/V, fever, chills, dysuria or any other complaints. All remainder ROS negative.     MEDICATIONS  (STANDING):  acetaminophen   IVPB .. 1000 milliGRAM(s) IV Intermittent once  calcitriol   Capsule 0.25 MICROGram(s) Oral daily  calcium carbonate    500 mG (Tums) Chewable 1 Tablet(s) Chew two times a day  epoetin dev (PROCRIT) Injectable 63449 Unit(s) SubCutaneous every 7 days  fludroCORTISONE 0.1 milliGRAM(s) Oral daily  folic acid 1 milliGRAM(s) Oral daily  levothyroxine 25 MICROGram(s) Oral daily  multivitamin 1 Tablet(s) Oral daily  pantoprazole    Tablet 40 milliGRAM(s) Oral before breakfast  sodium bicarbonate 650 milliGRAM(s) Oral three times a day  sodium chloride 0.9% lock flush 3 milliLiter(s) IV Push every 8 hours    MEDICATIONS  (PRN):  acetaminophen     Tablet .. 650 milliGRAM(s) Oral every 6 hours PRN Temp greater or equal to 38C (100.4F), Mild Pain (1 - 3)  albuterol    90 MICROgram(s) HFA Inhaler 2 Puff(s) Inhalation every 6 hours PRN Shortness of Breath and/or Wheezing  aluminum hydroxide/magnesium hydroxide/simethicone Suspension 30 milliLiter(s) Oral every 4 hours PRN Dyspepsia  guaiFENesin Oral Liquid (Sugar-Free) 100 milliGRAM(s) Oral every 6 hours PRN Cough  melatonin 3 milliGRAM(s) Oral at bedtime PRN Insomnia  ondansetron Injectable 4 milliGRAM(s) IV Push every 8 hours PRN Nausea and/or Vomiting        I&O's Summary    09 Jan 2024 07:01  -  10 Al 2024 07:00  --------------------------------------------------------  IN: 1350 mL / OUT: 0 mL / NET: 1350 mL    10 Al 2024 07:01  -  10 Al 2024 11:14  --------------------------------------------------------  IN: 300 mL / OUT: 0 mL / NET: 300 mL        PHYSICAL EXAM:  Vital Signs Last 24 Hrs  T(C): 36.9 (10 Al 2024 04:19), Max: 36.9 (10 Al 2024 04:19)  T(F): 98.5 (10 Al 2024 04:19), Max: 98.5 (10 Al 2024 04:19)  HR: 66 (10 Al 2024 04:19) (66 - 83)  BP: 112/72 (10 Al 2024 04:19) (105/66 - 112/72)  BP(mean): --  RR: 17 (10 Al 2024 04:19) (17 - 18)  SpO2: 95% (10 Al 2024 04:19) (95% - 98%)    Parameters below as of 10 Al 2024 04:19  Patient On (Oxygen Delivery Method): room air          General: Young female laying in bed comfortably. No acute distress  HEENT: EOMI. Clear conjunctivae. Moist mucus membrane  Neck: Supple.   Chest: CTA bilaterally. No wheezing, rales or rhonchi.   Heart: Normal S1 & S2. RRR.   Abdomen: Non distended. Soft. Non-tender. + BS  Ext: No pedal edema. No calf tenderness   Neuro: AAO x 3. No focal deficit. No speech disorder  Skin: Warm and Dry  Psychiatry: Normal mood and affect    LABS:                        8.1    9.73  )-----------( 210      ( 10 Al 2024 05:25 )             27.1     01-10    141  |  103  |  25.5<H>  ----------------------------<  78  3.6   |  24.0  |  3.40<H>    Ca    8.4      10 Al 2024 05:25    TPro  5.7<L>  /  Alb  3.2<L>  /  TBili  0.2<L>  /  DBili  x   /  AST  27  /  ALT  76<H>  /  AlkPhos  54  01-10          Urinalysis Basic - ( 10 Al 2024 05:25 )    Color: x / Appearance: x / SG: x / pH: x  Gluc: 78 mg/dL / Ketone: x  / Bili: x / Urobili: x   Blood: x / Protein: x / Nitrite: x   Leuk Esterase: x / RBC: x / WBC x   Sq Epi: x / Non Sq Epi: x / Bacteria: x        CAPILLARY BLOOD GLUCOSE            RADIOLOGY & ADDITIONAL TESTS:  Results Reviewed:   Imaging Personally Reviewed:  Electrocardiogram Personally Reviewed:

## 2024-01-11 LAB
ALBUMIN SERPL ELPH-MCNC: 3.2 G/DL — LOW (ref 3.3–5.2)
ALBUMIN SERPL ELPH-MCNC: 3.2 G/DL — LOW (ref 3.3–5.2)
ALP SERPL-CCNC: 74 U/L — SIGNIFICANT CHANGE UP (ref 40–120)
ALP SERPL-CCNC: 74 U/L — SIGNIFICANT CHANGE UP (ref 40–120)
ALT FLD-CCNC: 58 U/L — HIGH
ALT FLD-CCNC: 58 U/L — HIGH
ANION GAP SERPL CALC-SCNC: 13 MMOL/L — SIGNIFICANT CHANGE UP (ref 5–17)
ANION GAP SERPL CALC-SCNC: 13 MMOL/L — SIGNIFICANT CHANGE UP (ref 5–17)
AST SERPL-CCNC: 18 U/L — SIGNIFICANT CHANGE UP
AST SERPL-CCNC: 18 U/L — SIGNIFICANT CHANGE UP
BILIRUB SERPL-MCNC: <0.2 MG/DL — LOW (ref 0.4–2)
BILIRUB SERPL-MCNC: <0.2 MG/DL — LOW (ref 0.4–2)
BUN SERPL-MCNC: 36.9 MG/DL — HIGH (ref 8–20)
BUN SERPL-MCNC: 36.9 MG/DL — HIGH (ref 8–20)
CALCIUM SERPL-MCNC: 8.6 MG/DL — SIGNIFICANT CHANGE UP (ref 8.4–10.5)
CALCIUM SERPL-MCNC: 8.6 MG/DL — SIGNIFICANT CHANGE UP (ref 8.4–10.5)
CHLORIDE SERPL-SCNC: 105 MMOL/L — SIGNIFICANT CHANGE UP (ref 96–108)
CHLORIDE SERPL-SCNC: 105 MMOL/L — SIGNIFICANT CHANGE UP (ref 96–108)
CO2 SERPL-SCNC: 23 MMOL/L — SIGNIFICANT CHANGE UP (ref 22–29)
CO2 SERPL-SCNC: 23 MMOL/L — SIGNIFICANT CHANGE UP (ref 22–29)
CREAT SERPL-MCNC: 4 MG/DL — HIGH (ref 0.5–1.3)
CREAT SERPL-MCNC: 4 MG/DL — HIGH (ref 0.5–1.3)
EGFR: 15 ML/MIN/1.73M2 — LOW
EGFR: 15 ML/MIN/1.73M2 — LOW
GLUCOSE SERPL-MCNC: 87 MG/DL — SIGNIFICANT CHANGE UP (ref 70–99)
GLUCOSE SERPL-MCNC: 87 MG/DL — SIGNIFICANT CHANGE UP (ref 70–99)
HCT VFR BLD CALC: 30.4 % — LOW (ref 34.5–45)
HCT VFR BLD CALC: 30.4 % — LOW (ref 34.5–45)
HGB BLD-MCNC: 9 G/DL — LOW (ref 11.5–15.5)
HGB BLD-MCNC: 9 G/DL — LOW (ref 11.5–15.5)
MCHC RBC-ENTMCNC: 27.4 PG — SIGNIFICANT CHANGE UP (ref 27–34)
MCHC RBC-ENTMCNC: 27.4 PG — SIGNIFICANT CHANGE UP (ref 27–34)
MCHC RBC-ENTMCNC: 29.6 GM/DL — LOW (ref 32–36)
MCHC RBC-ENTMCNC: 29.6 GM/DL — LOW (ref 32–36)
MCV RBC AUTO: 92.7 FL — SIGNIFICANT CHANGE UP (ref 80–100)
MCV RBC AUTO: 92.7 FL — SIGNIFICANT CHANGE UP (ref 80–100)
PLATELET # BLD AUTO: 238 K/UL — SIGNIFICANT CHANGE UP (ref 150–400)
PLATELET # BLD AUTO: 238 K/UL — SIGNIFICANT CHANGE UP (ref 150–400)
POTASSIUM SERPL-MCNC: 3.5 MMOL/L — SIGNIFICANT CHANGE UP (ref 3.5–5.3)
POTASSIUM SERPL-MCNC: 3.5 MMOL/L — SIGNIFICANT CHANGE UP (ref 3.5–5.3)
POTASSIUM SERPL-SCNC: 3.5 MMOL/L — SIGNIFICANT CHANGE UP (ref 3.5–5.3)
POTASSIUM SERPL-SCNC: 3.5 MMOL/L — SIGNIFICANT CHANGE UP (ref 3.5–5.3)
PROT SERPL-MCNC: 6.2 G/DL — LOW (ref 6.6–8.7)
PROT SERPL-MCNC: 6.2 G/DL — LOW (ref 6.6–8.7)
RBC # BLD: 3.28 M/UL — LOW (ref 3.8–5.2)
RBC # BLD: 3.28 M/UL — LOW (ref 3.8–5.2)
RBC # FLD: 18.8 % — HIGH (ref 10.3–14.5)
RBC # FLD: 18.8 % — HIGH (ref 10.3–14.5)
SODIUM SERPL-SCNC: 141 MMOL/L — SIGNIFICANT CHANGE UP (ref 135–145)
SODIUM SERPL-SCNC: 141 MMOL/L — SIGNIFICANT CHANGE UP (ref 135–145)
WBC # BLD: 11.04 K/UL — HIGH (ref 3.8–10.5)
WBC # BLD: 11.04 K/UL — HIGH (ref 3.8–10.5)
WBC # FLD AUTO: 11.04 K/UL — HIGH (ref 3.8–10.5)
WBC # FLD AUTO: 11.04 K/UL — HIGH (ref 3.8–10.5)

## 2024-01-11 PROCEDURE — 74182 MRI ABDOMEN W/CONTRAST: CPT | Mod: 26

## 2024-01-11 PROCEDURE — 99232 SBSQ HOSP IP/OBS MODERATE 35: CPT

## 2024-01-11 PROCEDURE — 99233 SBSQ HOSP IP/OBS HIGH 50: CPT

## 2024-01-11 RX ORDER — SODIUM CHLORIDE 9 MG/ML
1000 INJECTION, SOLUTION INTRAVENOUS
Refills: 0 | Status: DISCONTINUED | OUTPATIENT
Start: 2024-01-11 | End: 2024-01-12

## 2024-01-11 RX ADMIN — PANTOPRAZOLE SODIUM 40 MILLIGRAM(S): 20 TABLET, DELAYED RELEASE ORAL at 05:17

## 2024-01-11 RX ADMIN — Medication 1 MILLIGRAM(S): at 17:21

## 2024-01-11 RX ADMIN — FLUDROCORTISONE ACETATE 0.1 MILLIGRAM(S): 0.1 TABLET ORAL at 05:16

## 2024-01-11 RX ADMIN — Medication 1 TABLET(S): at 05:16

## 2024-01-11 RX ADMIN — Medication 650 MILLIGRAM(S): at 05:16

## 2024-01-11 RX ADMIN — Medication 650 MILLIGRAM(S): at 21:21

## 2024-01-11 RX ADMIN — SODIUM CHLORIDE 3 MILLILITER(S): 9 INJECTION INTRAMUSCULAR; INTRAVENOUS; SUBCUTANEOUS at 17:34

## 2024-01-11 RX ADMIN — Medication 25 MICROGRAM(S): at 05:16

## 2024-01-11 RX ADMIN — CALCITRIOL 0.25 MICROGRAM(S): 0.5 CAPSULE ORAL at 17:21

## 2024-01-11 RX ADMIN — SODIUM CHLORIDE 3 MILLILITER(S): 9 INJECTION INTRAMUSCULAR; INTRAVENOUS; SUBCUTANEOUS at 21:26

## 2024-01-11 RX ADMIN — SODIUM CHLORIDE 100 MILLILITER(S): 9 INJECTION, SOLUTION INTRAVENOUS at 17:21

## 2024-01-11 RX ADMIN — Medication 1 TABLET(S): at 17:21

## 2024-01-11 RX ADMIN — SODIUM CHLORIDE 3 MILLILITER(S): 9 INJECTION INTRAMUSCULAR; INTRAVENOUS; SUBCUTANEOUS at 07:50

## 2024-01-11 RX ADMIN — Medication 650 MILLIGRAM(S): at 17:21

## 2024-01-11 NOTE — PROGRESS NOTE ADULT - ASSESSMENT
CKD stage IV baseline of 2.5  Follows w me but has poor compliance w follow up in our office  +COVID  MARIVEL (?) COVID nephrotoxicity (?) ATN due to hypotension  Renal function slowly resolving improving  Will likely be at new higher baseline  M.A. --> better, cont po bicab  Hypokalemia  CT scan: Atrophic kidneys with bilateral indeterminate renal rené  -  MRI abdomen pending---> renal mass on CT scan, prefer to have study done prior to dc as pt has h/o poor compliance  - No urgent need for RRT may need in future pt aware    Hypotension: r/o adrenal insufficiency  - Hydrocortisone now weaned off--> Endocrine noted; ACTH stim test to r/o adrenal insufficiency  - cont Florinef    RO: iPTH 73  - cont CaCO3 and Rocaltrol  - trend labs    Anemia: +CKD + low Fe  - cont MARY and IV Fe  - target Hgb > 10.0    AM labs will follow

## 2024-01-11 NOTE — PROGRESS NOTE ADULT - SUBJECTIVE AND OBJECTIVE BOX
NEPHROLOGY INTERVAL HPI/OVERNIGHT EVENTS:    Examined  Looks well feels fine no distress  Denies HA CP no SOB    MEDICATIONS  (STANDING):  acetaminophen   IVPB .. 1000 milliGRAM(s) IV Intermittent once  calcitriol   Capsule 0.25 MICROGram(s) Oral daily  calcium carbonate    500 mG (Tums) Chewable 1 Tablet(s) Chew two times a day  epoetin dev (PROCRIT) Injectable 05793 Unit(s) SubCutaneous every 7 days  fludroCORTISONE 0.1 milliGRAM(s) Oral daily  folic acid 1 milliGRAM(s) Oral daily  levothyroxine 25 MICROGram(s) Oral daily  multivitamin 1 Tablet(s) Oral daily  pantoprazole    Tablet 40 milliGRAM(s) Oral before breakfast  sodium bicarbonate 650 milliGRAM(s) Oral three times a day  sodium chloride 0.9% lock flush 3 milliLiter(s) IV Push every 8 hours    MEDICATIONS  (PRN):  acetaminophen     Tablet .. 650 milliGRAM(s) Oral every 6 hours PRN Temp greater or equal to 38C (100.4F), Mild Pain (1 - 3)  albuterol    90 MICROgram(s) HFA Inhaler 2 Puff(s) Inhalation every 6 hours PRN Shortness of Breath and/or Wheezing  aluminum hydroxide/magnesium hydroxide/simethicone Suspension 30 milliLiter(s) Oral every 4 hours PRN Dyspepsia  guaiFENesin Oral Liquid (Sugar-Free) 100 milliGRAM(s) Oral every 6 hours PRN Cough  melatonin 3 milliGRAM(s) Oral at bedtime PRN Insomnia  ondansetron Injectable 4 milliGRAM(s) IV Push every 8 hours PRN Nausea and/or Vomiting      Allergies    No Known Allergies    Intolerances        Vital Signs Last 24 Hrs  T(C): 36.8 (11 Jan 2024 11:09), Max: 37 (10 Al 2024 15:31)  T(F): 98.3 (11 Jan 2024 11:09), Max: 98.6 (10 Al 2024 15:31)  HR: 72 (11 Jan 2024 11:09) (72 - 87)  BP: 102/66 (11 Jan 2024 11:09) (97/59 - 105/67)  BP(mean): --  RR: 18 (11 Jan 2024 11:09) (18 - 18)  SpO2: 100% (11 Jan 2024 11:09) (98% - 100%)    Parameters below as of 11 Jan 2024 05:18  Patient On (Oxygen Delivery Method): room air      Daily     Daily     PHYSICAL EXAM:  GENERAL: Comfortable  HEENT: No periorbital edema  NECK: Supple; no JVD  NERVOUS SYSTEM: Awake, alert  EXTREMITIES: no dependent edema  Further exam deferred to limit COVID exposure    LABS:                        9.0    11.04 )-----------( 238      ( 11 Jan 2024 05:15 )             30.4     01-11    141  |  105  |  36.9<H>  ----------------------------<  87  3.5   |  23.0  |  4.00<H>    Ca    8.6      11 Jan 2024 05:15    TPro  6.2<L>  /  Alb  3.2<L>  /  TBili  <0.2<L>  /  DBili  x   /  AST  18  /  ALT  58<H>  /  AlkPhos  74  01-11      Urinalysis Basic - ( 11 Jan 2024 05:15 )    Color: x / Appearance: x / SG: x / pH: x  Gluc: 87 mg/dL / Ketone: x  / Bili: x / Urobili: x   Blood: x / Protein: x / Nitrite: x   Leuk Esterase: x / RBC: x / WBC x   Sq Epi: x / Non Sq Epi: x / Bacteria: x              RADIOLOGY & ADDITIONAL TESTS:   NEPHROLOGY INTERVAL HPI/OVERNIGHT EVENTS:    Examined  Looks well feels fine no distress  Denies HA CP no SOB    MEDICATIONS  (STANDING):  acetaminophen   IVPB .. 1000 milliGRAM(s) IV Intermittent once  calcitriol   Capsule 0.25 MICROGram(s) Oral daily  calcium carbonate    500 mG (Tums) Chewable 1 Tablet(s) Chew two times a day  epoetin dev (PROCRIT) Injectable 71239 Unit(s) SubCutaneous every 7 days  fludroCORTISONE 0.1 milliGRAM(s) Oral daily  folic acid 1 milliGRAM(s) Oral daily  levothyroxine 25 MICROGram(s) Oral daily  multivitamin 1 Tablet(s) Oral daily  pantoprazole    Tablet 40 milliGRAM(s) Oral before breakfast  sodium bicarbonate 650 milliGRAM(s) Oral three times a day  sodium chloride 0.9% lock flush 3 milliLiter(s) IV Push every 8 hours    MEDICATIONS  (PRN):  acetaminophen     Tablet .. 650 milliGRAM(s) Oral every 6 hours PRN Temp greater or equal to 38C (100.4F), Mild Pain (1 - 3)  albuterol    90 MICROgram(s) HFA Inhaler 2 Puff(s) Inhalation every 6 hours PRN Shortness of Breath and/or Wheezing  aluminum hydroxide/magnesium hydroxide/simethicone Suspension 30 milliLiter(s) Oral every 4 hours PRN Dyspepsia  guaiFENesin Oral Liquid (Sugar-Free) 100 milliGRAM(s) Oral every 6 hours PRN Cough  melatonin 3 milliGRAM(s) Oral at bedtime PRN Insomnia  ondansetron Injectable 4 milliGRAM(s) IV Push every 8 hours PRN Nausea and/or Vomiting      Allergies    No Known Allergies    Intolerances        Vital Signs Last 24 Hrs  T(C): 36.8 (11 Jan 2024 11:09), Max: 37 (10 Al 2024 15:31)  T(F): 98.3 (11 Jan 2024 11:09), Max: 98.6 (10 Al 2024 15:31)  HR: 72 (11 Jan 2024 11:09) (72 - 87)  BP: 102/66 (11 Jan 2024 11:09) (97/59 - 105/67)  BP(mean): --  RR: 18 (11 Jan 2024 11:09) (18 - 18)  SpO2: 100% (11 Jan 2024 11:09) (98% - 100%)    Parameters below as of 11 Jan 2024 05:18  Patient On (Oxygen Delivery Method): room air      Daily     Daily     PHYSICAL EXAM:  GENERAL: Comfortable  HEENT: No periorbital edema  NECK: Supple; no JVD  NERVOUS SYSTEM: Awake, alert  EXTREMITIES: no dependent edema  Further exam deferred to limit COVID exposure    LABS:                        9.0    11.04 )-----------( 238      ( 11 Jan 2024 05:15 )             30.4     01-11    141  |  105  |  36.9<H>  ----------------------------<  87  3.5   |  23.0  |  4.00<H>    Ca    8.6      11 Jan 2024 05:15    TPro  6.2<L>  /  Alb  3.2<L>  /  TBili  <0.2<L>  /  DBili  x   /  AST  18  /  ALT  58<H>  /  AlkPhos  74  01-11      Urinalysis Basic - ( 11 Jan 2024 05:15 )    Color: x / Appearance: x / SG: x / pH: x  Gluc: 87 mg/dL / Ketone: x  / Bili: x / Urobili: x   Blood: x / Protein: x / Nitrite: x   Leuk Esterase: x / RBC: x / WBC x   Sq Epi: x / Non Sq Epi: x / Bacteria: x              RADIOLOGY & ADDITIONAL TESTS:

## 2024-01-11 NOTE — PROGRESS NOTE ADULT - SUBJECTIVE AND OBJECTIVE BOX
SUBJECTIVE / OVERNIGHT EVENTS: Seen and examined. Used  Sho. She feels well. Spoke with radiology who indicated patient will get MR abdomen today. Creatinine worsening. Denies chest pain, SOB, abd pain, N/V, fever, chills, dysuria or any other complaints. All remainder ROS negative.     MEDICATIONS  (STANDING):  acetaminophen   IVPB .. 1000 milliGRAM(s) IV Intermittent once  calcitriol   Capsule 0.25 MICROGram(s) Oral daily  calcium carbonate    500 mG (Tums) Chewable 1 Tablet(s) Chew two times a day  epoetin dev (PROCRIT) Injectable 14902 Unit(s) SubCutaneous every 7 days  fludroCORTISONE 0.1 milliGRAM(s) Oral daily  folic acid 1 milliGRAM(s) Oral daily  levothyroxine 25 MICROGram(s) Oral daily  multivitamin 1 Tablet(s) Oral daily  pantoprazole    Tablet 40 milliGRAM(s) Oral before breakfast  sodium bicarbonate 650 milliGRAM(s) Oral three times a day  sodium chloride 0.9% lock flush 3 milliLiter(s) IV Push every 8 hours    MEDICATIONS  (PRN):  acetaminophen     Tablet .. 650 milliGRAM(s) Oral every 6 hours PRN Temp greater or equal to 38C (100.4F), Mild Pain (1 - 3)  albuterol    90 MICROgram(s) HFA Inhaler 2 Puff(s) Inhalation every 6 hours PRN Shortness of Breath and/or Wheezing  aluminum hydroxide/magnesium hydroxide/simethicone Suspension 30 milliLiter(s) Oral every 4 hours PRN Dyspepsia  guaiFENesin Oral Liquid (Sugar-Free) 100 milliGRAM(s) Oral every 6 hours PRN Cough  melatonin 3 milliGRAM(s) Oral at bedtime PRN Insomnia  ondansetron Injectable 4 milliGRAM(s) IV Push every 8 hours PRN Nausea and/or Vomiting        I&O's Summary    10 Al 2024 07:01  -  11 Jan 2024 07:00  --------------------------------------------------------  IN: 715 mL / OUT: 0 mL / NET: 715 mL        PHYSICAL EXAM:  Vital Signs Last 24 Hrs  T(C): 36.7 (11 Jan 2024 05:18), Max: 37 (10 Al 2024 15:31)  T(F): 98 (11 Jan 2024 05:18), Max: 98.6 (10 Al 2024 15:31)  HR: 87 (10 Al 2024 15:31) (74 - 87)  BP: 97/59 (11 Jan 2024 05:18) (97/59 - 110/69)  BP(mean): --  RR: 18 (11 Jan 2024 05:18) (18 - 18)  SpO2: 98% (11 Jan 2024 05:18) (98% - 99%)    Parameters below as of 11 Jan 2024 05:18  Patient On (Oxygen Delivery Method): room air          General: Young female laying in bed comfortably. No acute distress  HEENT: EOMI. Clear conjunctivae. Moist mucus membrane  Neck: Supple.   Chest: CTA bilaterally. No wheezing, rales or rhonchi.   Heart: Normal S1 & S2. RRR.   Abdomen: Non distended. Soft. Non-tender. + BS  Ext: No pedal edema. No calf tenderness   Neuro: AAO x 3. No focal deficit. No speech disorder  Skin: Warm and Dry  Psychiatry: Normal mood and affect    LABS:                        9.0    11.04 )-----------( 238      ( 11 Jan 2024 05:15 )             30.4     01-11    141  |  105  |  36.9<H>  ----------------------------<  87  3.5   |  23.0  |  4.00<H>    Ca    8.6      11 Jan 2024 05:15    TPro  6.2<L>  /  Alb  3.2<L>  /  TBili  <0.2<L>  /  DBili  x   /  AST  18  /  ALT  58<H>  /  AlkPhos  74  01-11          Urinalysis Basic - ( 11 Jan 2024 05:15 )    Color: x / Appearance: x / SG: x / pH: x  Gluc: 87 mg/dL / Ketone: x  / Bili: x / Urobili: x   Blood: x / Protein: x / Nitrite: x   Leuk Esterase: x / RBC: x / WBC x   Sq Epi: x / Non Sq Epi: x / Bacteria: x        CAPILLARY BLOOD GLUCOSE            RADIOLOGY & ADDITIONAL TESTS:  Results Reviewed:   Imaging Personally Reviewed:  Electrocardiogram Personally Reviewed:         SUBJECTIVE / OVERNIGHT EVENTS: Seen and examined. Used  Sho. She feels well. Spoke with radiology who indicated patient will get MR abdomen today. Creatinine worsening. Denies chest pain, SOB, abd pain, N/V, fever, chills, dysuria or any other complaints. All remainder ROS negative.     MEDICATIONS  (STANDING):  acetaminophen   IVPB .. 1000 milliGRAM(s) IV Intermittent once  calcitriol   Capsule 0.25 MICROGram(s) Oral daily  calcium carbonate    500 mG (Tums) Chewable 1 Tablet(s) Chew two times a day  epoetin dev (PROCRIT) Injectable 94122 Unit(s) SubCutaneous every 7 days  fludroCORTISONE 0.1 milliGRAM(s) Oral daily  folic acid 1 milliGRAM(s) Oral daily  levothyroxine 25 MICROGram(s) Oral daily  multivitamin 1 Tablet(s) Oral daily  pantoprazole    Tablet 40 milliGRAM(s) Oral before breakfast  sodium bicarbonate 650 milliGRAM(s) Oral three times a day  sodium chloride 0.9% lock flush 3 milliLiter(s) IV Push every 8 hours    MEDICATIONS  (PRN):  acetaminophen     Tablet .. 650 milliGRAM(s) Oral every 6 hours PRN Temp greater or equal to 38C (100.4F), Mild Pain (1 - 3)  albuterol    90 MICROgram(s) HFA Inhaler 2 Puff(s) Inhalation every 6 hours PRN Shortness of Breath and/or Wheezing  aluminum hydroxide/magnesium hydroxide/simethicone Suspension 30 milliLiter(s) Oral every 4 hours PRN Dyspepsia  guaiFENesin Oral Liquid (Sugar-Free) 100 milliGRAM(s) Oral every 6 hours PRN Cough  melatonin 3 milliGRAM(s) Oral at bedtime PRN Insomnia  ondansetron Injectable 4 milliGRAM(s) IV Push every 8 hours PRN Nausea and/or Vomiting        I&O's Summary    10 Al 2024 07:01  -  11 Jan 2024 07:00  --------------------------------------------------------  IN: 715 mL / OUT: 0 mL / NET: 715 mL        PHYSICAL EXAM:  Vital Signs Last 24 Hrs  T(C): 36.7 (11 Jan 2024 05:18), Max: 37 (10 Al 2024 15:31)  T(F): 98 (11 Jan 2024 05:18), Max: 98.6 (10 Al 2024 15:31)  HR: 87 (10 Al 2024 15:31) (74 - 87)  BP: 97/59 (11 Jan 2024 05:18) (97/59 - 110/69)  BP(mean): --  RR: 18 (11 Jan 2024 05:18) (18 - 18)  SpO2: 98% (11 Jan 2024 05:18) (98% - 99%)    Parameters below as of 11 Jan 2024 05:18  Patient On (Oxygen Delivery Method): room air          General: Young female laying in bed comfortably. No acute distress  HEENT: EOMI. Clear conjunctivae. Moist mucus membrane  Neck: Supple.   Chest: CTA bilaterally. No wheezing, rales or rhonchi.   Heart: Normal S1 & S2. RRR.   Abdomen: Non distended. Soft. Non-tender. + BS  Ext: No pedal edema. No calf tenderness   Neuro: AAO x 3. No focal deficit. No speech disorder  Skin: Warm and Dry  Psychiatry: Normal mood and affect    LABS:                        9.0    11.04 )-----------( 238      ( 11 Jan 2024 05:15 )             30.4     01-11    141  |  105  |  36.9<H>  ----------------------------<  87  3.5   |  23.0  |  4.00<H>    Ca    8.6      11 Jan 2024 05:15    TPro  6.2<L>  /  Alb  3.2<L>  /  TBili  <0.2<L>  /  DBili  x   /  AST  18  /  ALT  58<H>  /  AlkPhos  74  01-11          Urinalysis Basic - ( 11 Jan 2024 05:15 )    Color: x / Appearance: x / SG: x / pH: x  Gluc: 87 mg/dL / Ketone: x  / Bili: x / Urobili: x   Blood: x / Protein: x / Nitrite: x   Leuk Esterase: x / RBC: x / WBC x   Sq Epi: x / Non Sq Epi: x / Bacteria: x        CAPILLARY BLOOD GLUCOSE            RADIOLOGY & ADDITIONAL TESTS:  Results Reviewed:   Imaging Personally Reviewed:  Electrocardiogram Personally Reviewed:

## 2024-01-11 NOTE — PROGRESS NOTE ADULT - ASSESSMENT
23F British speaking, w/ hypothyroidism and ckd brought in for AMS at a [employer] event. Patient was apparently in her normal state of health at a [employer] event and shortly after eating rice/beans and meat started c/o abdominal pain and cramping and then vomited. Friends who witnessed it said they called EMS due to patient being very agitated. No hx of drug use. She has a hx of "kidney problems" and used to be on medications for it but self discontinued " a while ago." In the field Patient given Versed and then Ativan in ER to allow for exam and labs. Labs with Hbg of 8.6 and Cr. of 3.95 with reported baseline of 2.5. No reported mental health history. Admitted for acute metabolic encephalopathy with MARIVEL on CKD. Found to have COVID. Course complicated by high grade fever, hypotension. Consult for adrenal insufficiency    1. Hypotension- BP soft but stable, on Florinef  - Initial AM cortisol not accurate because pt was already on Hydrocortisone  - Hydrocortisone last dose was 1/6 am  - Cortisol from today is low, 4  - ACTH stimulation test acceptable, can continue to monitor    2. Hypothyroidism  - TSH 3.55, FT4 1.1  - TPO ab negative  - Thyroid ultrasound is normal  - Continue with home dose LT4 25 mcg 23F Bangladeshi speaking, w/ hypothyroidism and ckd brought in for AMS at a [employer] event. Patient was apparently in her normal state of health at a [employer] event and shortly after eating rice/beans and meat started c/o abdominal pain and cramping and then vomited. Friends who witnessed it said they called EMS due to patient being very agitated. No hx of drug use. She has a hx of "kidney problems" and used to be on medications for it but self discontinued " a while ago." In the field Patient given Versed and then Ativan in ER to allow for exam and labs. Labs with Hbg of 8.6 and Cr. of 3.95 with reported baseline of 2.5. No reported mental health history. Admitted for acute metabolic encephalopathy with MARIVEL on CKD. Found to have COVID. Course complicated by high grade fever, hypotension. Consult for adrenal insufficiency    1. Hypotension- BP soft but stable, on Florinef  - Initial AM cortisol not accurate because pt was already on Hydrocortisone  - Hydrocortisone last dose was 1/6 am  - Cortisol from today is low, 4  - ACTH stimulation test acceptable, can continue to monitor    2. Hypothyroidism  - TSH 3.55, FT4 1.1  - TPO ab negative  - Thyroid ultrasound is normal  - Continue with home dose LT4 25 mcg 23F Indian speaking, w/ hypothyroidism and ckd brought in for AMS at a [employer] event. Patient was apparently in her normal state of health at a [employer] event and shortly after eating rice/beans and meat started c/o abdominal pain and cramping and then vomited. Friends who witnessed it said they called EMS due to patient being very agitated. No hx of drug use. She has a hx of "kidney problems" and used to be on medications for it but self discontinued " a while ago." In the field Patient given Versed and then Ativan in ER to allow for exam and labs. Labs with Hbg of 8.6 and Cr. of 3.95 with reported baseline of 2.5. No reported mental health history. Admitted for acute metabolic encephalopathy with MARIVEL on CKD. Found to have COVID. Course complicated by high grade fever, hypotension. Consult for adrenal insufficiency    1. Hypotension- BP soft but stable, on Florinef  - Initial AM cortisol not accurate because pt was already on Hydrocortisone  - Hydrocortisone last dose was 1/6 am  - Cortisol from today is low, 4  - ACTH stimulation test acceptable, can continue to monitor  - Will check AM cortisol again tomorrow    2. Hypothyroidism  - TSH 3.55, FT4 1.1  - TPO ab negative  - Thyroid ultrasound is normal  - Continue with home dose LT4 25 mcg 23F Indonesian speaking, w/ hypothyroidism and ckd brought in for AMS at a [employer] event. Patient was apparently in her normal state of health at a [employer] event and shortly after eating rice/beans and meat started c/o abdominal pain and cramping and then vomited. Friends who witnessed it said they called EMS due to patient being very agitated. No hx of drug use. She has a hx of "kidney problems" and used to be on medications for it but self discontinued " a while ago." In the field Patient given Versed and then Ativan in ER to allow for exam and labs. Labs with Hbg of 8.6 and Cr. of 3.95 with reported baseline of 2.5. No reported mental health history. Admitted for acute metabolic encephalopathy with MARIVEL on CKD. Found to have COVID. Course complicated by high grade fever, hypotension. Consult for adrenal insufficiency    1. Hypotension- BP soft but stable, on Florinef  - Initial AM cortisol not accurate because pt was already on Hydrocortisone  - Hydrocortisone last dose was 1/6 am  - Cortisol from today is low, 4  - ACTH stimulation test acceptable, can continue to monitor  - Will check AM cortisol again tomorrow    2. Hypothyroidism  - TSH 3.55, FT4 1.1  - TPO ab negative  - Thyroid ultrasound is normal  - Continue with home dose LT4 25 mcg 23F Taiwanese speaking, w/ hypothyroidism and ckd brought in for AMS at a [employer] event. Patient was apparently in her normal state of health at a [employer] event and shortly after eating rice/beans and meat started c/o abdominal pain and cramping and then vomited. Friends who witnessed it said they called EMS due to patient being very agitated. No hx of drug use. She has a hx of "kidney problems" and used to be on medications for it but self discontinued " a while ago." In the field Patient given Versed and then Ativan in ER to allow for exam and labs. Labs with Hbg of 8.6 and Cr. of 3.95 with reported baseline of 2.5. No reported mental health history. Admitted for acute metabolic encephalopathy with MARIVEL on CKD. Found to have COVID. Course complicated by high grade fever, hypotension. Consult for adrenal insufficiency    1. Hypotension- BP soft but stable, on Florinef  - Initial AM cortisol not accurate because pt was already on Hydrocortisone  - Hydrocortisone last dose was 1/6 am  - ACTH stimulation test acceptable, can continue to monitor  - Will check AM cortisol again tomorrow    2. Hypothyroidism  - TSH 3.55, FT4 1.1  - TPO ab negative  - Thyroid ultrasound is normal  - Continue with home dose LT4 25 mcg 23F Hong Konger speaking, w/ hypothyroidism and ckd brought in for AMS at a [employer] event. Patient was apparently in her normal state of health at a [employer] event and shortly after eating rice/beans and meat started c/o abdominal pain and cramping and then vomited. Friends who witnessed it said they called EMS due to patient being very agitated. No hx of drug use. She has a hx of "kidney problems" and used to be on medications for it but self discontinued " a while ago." In the field Patient given Versed and then Ativan in ER to allow for exam and labs. Labs with Hbg of 8.6 and Cr. of 3.95 with reported baseline of 2.5. No reported mental health history. Admitted for acute metabolic encephalopathy with MARIVEL on CKD. Found to have COVID. Course complicated by high grade fever, hypotension. Consult for adrenal insufficiency    1. Hypotension- BP soft but stable, on Florinef  - Initial AM cortisol not accurate because pt was already on Hydrocortisone  - Hydrocortisone last dose was 1/6 am  - ACTH stimulation test acceptable, can continue to monitor  - Will check AM cortisol again tomorrow    2. Hypothyroidism  - TSH 3.55, FT4 1.1  - TPO ab negative  - Thyroid ultrasound is normal  - Continue with home dose LT4 25 mcg

## 2024-01-11 NOTE — PROGRESS NOTE ADULT - ASSESSMENT
24 y/o female whose real name is Theresa Tsang. brought from [facility] for [place]. Patient was apparently in her normal state of health at a [employer] event and shortly after eating rice/beans and meat started c/o abdominal pain and cramping and then vomited. Friends who witnessed it said they called EMS due to patient being very agitated. She has a hx of "kidney problems" and used to be on medications for it but self discontinued " a while ago."Labs with Hbg of 8.6 and Cr. of 3.95 with reported baseline of 2.5. No reported mental health history. Patient now at mental baseline however hypotension in setting of possible AI? Sinus bradycardia and awaiting MR abdomen for renal lesions due to poor outpatient follow up.     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 Pain related?   -CTH neg  -Utox positive for benzo likely given en route to ER  -Back to baseline now   -Monitor     Anemia  -Likely on basis of CKD  -Low iron stores, s/p Venofor  -FOBT negative     COVID-19/Leukocytosis (resolved)  -No hypoxia  -Supportive Care  -DC zosyn, s/p 3 days  -Bcx negative to date  -Increased wbc likely due to steroids     Hypocalcemia  Hypokalemia  -Replete and monitor      DVT Prophylaxis -- Venodyne  Dispo: Home once stable.  Pending improvement in renal function and MR abdomen   22 y/o female whose real name is Theresa Tsang. brought from [facility] for evaluation. Patient was apparently in her normal state of health at a Confucianist event and shortly after eating rice/beans and meat started c/o abdominal pain and cramping and then vomited. Friends who witnessed it said they called EMS due to patient being very agitated. She has a hx of "kidney problems" and used to be on medications for it but self discontinued " a while ago."Labs with Hbg of 8.6 and Cr. of 3.95 with reported baseline of 2.5. No reported mental health history. Patient now at mental baseline however hypotension in setting of possible AI? Sinus bradycardia and awaiting MR abdomen for renal lesions due to poor outpatient follow up.     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 Pain related?   -CTH neg  -Utox positive for benzo likely given en route to ER  -Back to baseline now   -Monitor     Anemia  -Likely on basis of CKD  -Low iron stores, s/p Venofor  -FOBT negative     COVID-19/Leukocytosis (resolved)  -No hypoxia  -Supportive Care  -DC zosyn, s/p 3 days  -Bcx negative to date  -Increased wbc likely due to steroids     Hypocalcemia  Hypokalemia  -Replete and monitor      DVT Prophylaxis -- Venodyne  Dispo: Home once stable.  Pending improvement in renal function and MR abdomen

## 2024-01-11 NOTE — PROGRESS NOTE ADULT - SUBJECTIVE AND OBJECTIVE BOX
INTERVAL EVENTS:  follow up for thyroid management  translation ID#: [id number]    ROS: Denies chest pain or sob. Feels good. No complaints    MEDICATIONS  (STANDING):  acetaminophen   IVPB .. 1000 milliGRAM(s) IV Intermittent once  calcitriol   Capsule 0.25 MICROGram(s) Oral daily  calcium carbonate    500 mG (Tums) Chewable 1 Tablet(s) Chew two times a day  epoetin dev (PROCRIT) Injectable 35802 Unit(s) SubCutaneous every 7 days  fludroCORTISONE 0.1 milliGRAM(s) Oral daily  folic acid 1 milliGRAM(s) Oral daily  levothyroxine 25 MICROGram(s) Oral daily  multivitamin 1 Tablet(s) Oral daily  pantoprazole    Tablet 40 milliGRAM(s) Oral before breakfast  sodium bicarbonate 650 milliGRAM(s) Oral three times a day  sodium chloride 0.9% lock flush 3 milliLiter(s) IV Push every 8 hours    MEDICATIONS  (PRN):  acetaminophen     Tablet .. 650 milliGRAM(s) Oral every 6 hours PRN Temp greater or equal to 38C (100.4F), Mild Pain (1 - 3)  albuterol    90 MICROgram(s) HFA Inhaler 2 Puff(s) Inhalation every 6 hours PRN Shortness of Breath and/or Wheezing  aluminum hydroxide/magnesium hydroxide/simethicone Suspension 30 milliLiter(s) Oral every 4 hours PRN Dyspepsia  guaiFENesin Oral Liquid (Sugar-Free) 100 milliGRAM(s) Oral every 6 hours PRN Cough  melatonin 3 milliGRAM(s) Oral at bedtime PRN Insomnia  ondansetron Injectable 4 milliGRAM(s) IV Push every 8 hours PRN Nausea and/or Vomiting      Allergies  No Known Allergies      Vital Signs Last 24 Hrs  T(C): 36.7 (11 Jan 2024 05:18), Max: 37 (10 Al 2024 15:31)  T(F): 98 (11 Jan 2024 05:18), Max: 98.6 (10 Al 2024 15:31)  HR: 87 (10 Al 2024 15:31) (74 - 87)  BP: 97/59 (11 Jan 2024 05:18) (97/59 - 110/69)  BP(mean): --  RR: 18 (11 Jan 2024 05:18) (18 - 18)  SpO2: 98% (11 Jan 2024 05:18) (98% - 99%)    Parameters below as of 11 Jan 2024 05:18  Patient On (Oxygen Delivery Method): room air        PHYSICAL EXAM:  GENERAL: NAD, comfortable  NECK: Supple; trachea midline  CHEST/LUNG: Clear to auscultation bilaterally; No wheezes  HEART: Regular rate and rhythm; No murmurs, rubs, or gallops  ABDOMEN: Soft, Nontender, Nondistended; Bowel sounds present  NEURO: AAOx3, no tremor  EXTREMITIES:  2+ Peripheral Pulses, no edema      LABS:                        9.0    11.04 )-----------( 238      ( 11 Jan 2024 05:15 )             30.4     01-11    141  |  105  |  36.9<H>  ----------------------------<  87  3.5   |  23.0  |  4.00<H>    Ca    8.6      11 Jan 2024 05:15    TPro  6.2<L>  /  Alb  3.2<L>  /  TBili  <0.2<L>  /  DBili  x   /  AST  18  /  ALT  58<H>  /  AlkPhos  74  01-11    Urinalysis Basic - ( 11 Jan 2024 05:15 )    Color: x / Appearance: x / SG: x / pH: x  Gluc: 87 mg/dL / Ketone: x  / Bili: x / Urobili: x   Blood: x / Protein: x / Nitrite: x   Leuk Esterase: x / RBC: x / WBC x   Sq Epi: x / Non Sq Epi: x / Bacteria: x                Free Thyroxine, Serum: 1.1 ng/dL (01-05-24 @ 06:16)  Thyroid Stimulating Hormone, Serum: 3.55 uIU/mL (01-05-24 @ 06:16)  Thyroid Stimulating Hormone, Serum: 1.99 uIU/mL (01-03-24 @ 06:42)   INTERVAL EVENTS:  follow up for thyroid management  translation ID#: [id number]    ROS: Denies chest pain or sob. Feels good. No complaints    MEDICATIONS  (STANDING):  acetaminophen   IVPB .. 1000 milliGRAM(s) IV Intermittent once  calcitriol   Capsule 0.25 MICROGram(s) Oral daily  calcium carbonate    500 mG (Tums) Chewable 1 Tablet(s) Chew two times a day  epoetin dev (PROCRIT) Injectable 00580 Unit(s) SubCutaneous every 7 days  fludroCORTISONE 0.1 milliGRAM(s) Oral daily  folic acid 1 milliGRAM(s) Oral daily  levothyroxine 25 MICROGram(s) Oral daily  multivitamin 1 Tablet(s) Oral daily  pantoprazole    Tablet 40 milliGRAM(s) Oral before breakfast  sodium bicarbonate 650 milliGRAM(s) Oral three times a day  sodium chloride 0.9% lock flush 3 milliLiter(s) IV Push every 8 hours    MEDICATIONS  (PRN):  acetaminophen     Tablet .. 650 milliGRAM(s) Oral every 6 hours PRN Temp greater or equal to 38C (100.4F), Mild Pain (1 - 3)  albuterol    90 MICROgram(s) HFA Inhaler 2 Puff(s) Inhalation every 6 hours PRN Shortness of Breath and/or Wheezing  aluminum hydroxide/magnesium hydroxide/simethicone Suspension 30 milliLiter(s) Oral every 4 hours PRN Dyspepsia  guaiFENesin Oral Liquid (Sugar-Free) 100 milliGRAM(s) Oral every 6 hours PRN Cough  melatonin 3 milliGRAM(s) Oral at bedtime PRN Insomnia  ondansetron Injectable 4 milliGRAM(s) IV Push every 8 hours PRN Nausea and/or Vomiting      Allergies  No Known Allergies      Vital Signs Last 24 Hrs  T(C): 36.7 (11 Jan 2024 05:18), Max: 37 (10 Al 2024 15:31)  T(F): 98 (11 Jan 2024 05:18), Max: 98.6 (10 Al 2024 15:31)  HR: 87 (10 Al 2024 15:31) (74 - 87)  BP: 97/59 (11 Jan 2024 05:18) (97/59 - 110/69)  BP(mean): --  RR: 18 (11 Jan 2024 05:18) (18 - 18)  SpO2: 98% (11 Jan 2024 05:18) (98% - 99%)    Parameters below as of 11 Jan 2024 05:18  Patient On (Oxygen Delivery Method): room air        PHYSICAL EXAM:  GENERAL: NAD, comfortable  NECK: Supple; trachea midline  CHEST/LUNG: Clear to auscultation bilaterally; No wheezes  HEART: Regular rate and rhythm; No murmurs, rubs, or gallops  ABDOMEN: Soft, Nontender, Nondistended; Bowel sounds present  NEURO: AAOx3, no tremor  EXTREMITIES:  2+ Peripheral Pulses, no edema      LABS:                        9.0    11.04 )-----------( 238      ( 11 Jan 2024 05:15 )             30.4     01-11    141  |  105  |  36.9<H>  ----------------------------<  87  3.5   |  23.0  |  4.00<H>    Ca    8.6      11 Jan 2024 05:15    TPro  6.2<L>  /  Alb  3.2<L>  /  TBili  <0.2<L>  /  DBili  x   /  AST  18  /  ALT  58<H>  /  AlkPhos  74  01-11    Urinalysis Basic - ( 11 Jan 2024 05:15 )    Color: x / Appearance: x / SG: x / pH: x  Gluc: 87 mg/dL / Ketone: x  / Bili: x / Urobili: x   Blood: x / Protein: x / Nitrite: x   Leuk Esterase: x / RBC: x / WBC x   Sq Epi: x / Non Sq Epi: x / Bacteria: x                Free Thyroxine, Serum: 1.1 ng/dL (01-05-24 @ 06:16)  Thyroid Stimulating Hormone, Serum: 3.55 uIU/mL (01-05-24 @ 06:16)  Thyroid Stimulating Hormone, Serum: 1.99 uIU/mL (01-03-24 @ 06:42)   INTERVAL EVENTS:  follow up for thyroid management  translation ID#: [id number]    ROS: Denies chest pain or sob. Feels good. No complaints    MEDICATIONS  (STANDING):  acetaminophen   IVPB .. 1000 milliGRAM(s) IV Intermittent once  calcitriol   Capsule 0.25 MICROGram(s) Oral daily  calcium carbonate    500 mG (Tums) Chewable 1 Tablet(s) Chew two times a day  epoetin dev (PROCRIT) Injectable 74612 Unit(s) SubCutaneous every 7 days  fludroCORTISONE 0.1 milliGRAM(s) Oral daily  folic acid 1 milliGRAM(s) Oral daily  levothyroxine 25 MICROGram(s) Oral daily  multivitamin 1 Tablet(s) Oral daily  pantoprazole    Tablet 40 milliGRAM(s) Oral before breakfast  sodium bicarbonate 650 milliGRAM(s) Oral three times a day  sodium chloride 0.9% lock flush 3 milliLiter(s) IV Push every 8 hours    MEDICATIONS  (PRN):  acetaminophen     Tablet .. 650 milliGRAM(s) Oral every 6 hours PRN Temp greater or equal to 38C (100.4F), Mild Pain (1 - 3)  albuterol    90 MICROgram(s) HFA Inhaler 2 Puff(s) Inhalation every 6 hours PRN Shortness of Breath and/or Wheezing  aluminum hydroxide/magnesium hydroxide/simethicone Suspension 30 milliLiter(s) Oral every 4 hours PRN Dyspepsia  guaiFENesin Oral Liquid (Sugar-Free) 100 milliGRAM(s) Oral every 6 hours PRN Cough  melatonin 3 milliGRAM(s) Oral at bedtime PRN Insomnia  ondansetron Injectable 4 milliGRAM(s) IV Push every 8 hours PRN Nausea and/or Vomiting    Allergies  No Known Allergies    Vital Signs Last 24 Hrs  T(C): 36.7 (11 Jan 2024 05:18), Max: 37 (10 Al 2024 15:31)  T(F): 98 (11 Jan 2024 05:18), Max: 98.6 (10 Al 2024 15:31)  HR: 87 (10 Al 2024 15:31) (74 - 87)  BP: 97/59 (11 Jan 2024 05:18) (97/59 - 110/69)  BP(mean): --  RR: 18 (11 Jan 2024 05:18) (18 - 18)  SpO2: 98% (11 Jan 2024 05:18) (98% - 99%)    Parameters below as of 11 Jan 2024 05:18  Patient On (Oxygen Delivery Method): room air    PHYSICAL EXAM:  GENERAL: NAD, comfortable  NECK: Supple; trachea midline  CHEST/LUNG: Clear to auscultation bilaterally; No wheezes  HEART: Regular rate and rhythm; No murmurs, rubs, or gallops  ABDOMEN: Soft, Nontender, Nondistended; Bowel sounds present  NEURO: AAOx3, no tremor  EXTREMITIES:  2+ Peripheral Pulses, no edema      LABS:                        9.0    11.04 )-----------( 238      ( 11 Jan 2024 05:15 )             30.4     01-11    141  |  105  |  36.9<H>  ----------------------------<  87  3.5   |  23.0  |  4.00<H>    Ca    8.6      11 Jan 2024 05:15    TPro  6.2<L>  /  Alb  3.2<L>  /  TBili  <0.2<L>  /  DBili  x   /  AST  18  /  ALT  58<H>  /  AlkPhos  74  01-11    Urinalysis Basic - ( 11 Jan 2024 05:15 )    Color: x / Appearance: x / SG: x / pH: x  Gluc: 87 mg/dL / Ketone: x  / Bili: x / Urobili: x   Blood: x / Protein: x / Nitrite: x   Leuk Esterase: x / RBC: x / WBC x   Sq Epi: x / Non Sq Epi: x / Bacteria: x                Free Thyroxine, Serum: 1.1 ng/dL (01-05-24 @ 06:16)  Thyroid Stimulating Hormone, Serum: 3.55 uIU/mL (01-05-24 @ 06:16)  Thyroid Stimulating Hormone, Serum: 1.99 uIU/mL (01-03-24 @ 06:42)   INTERVAL EVENTS:  follow up for thyroid management  translation ID#: [id number]    ROS: Denies chest pain or sob. Feels good. No complaints    MEDICATIONS  (STANDING):  acetaminophen   IVPB .. 1000 milliGRAM(s) IV Intermittent once  calcitriol   Capsule 0.25 MICROGram(s) Oral daily  calcium carbonate    500 mG (Tums) Chewable 1 Tablet(s) Chew two times a day  epoetin dev (PROCRIT) Injectable 92709 Unit(s) SubCutaneous every 7 days  fludroCORTISONE 0.1 milliGRAM(s) Oral daily  folic acid 1 milliGRAM(s) Oral daily  levothyroxine 25 MICROGram(s) Oral daily  multivitamin 1 Tablet(s) Oral daily  pantoprazole    Tablet 40 milliGRAM(s) Oral before breakfast  sodium bicarbonate 650 milliGRAM(s) Oral three times a day  sodium chloride 0.9% lock flush 3 milliLiter(s) IV Push every 8 hours    MEDICATIONS  (PRN):  acetaminophen     Tablet .. 650 milliGRAM(s) Oral every 6 hours PRN Temp greater or equal to 38C (100.4F), Mild Pain (1 - 3)  albuterol    90 MICROgram(s) HFA Inhaler 2 Puff(s) Inhalation every 6 hours PRN Shortness of Breath and/or Wheezing  aluminum hydroxide/magnesium hydroxide/simethicone Suspension 30 milliLiter(s) Oral every 4 hours PRN Dyspepsia  guaiFENesin Oral Liquid (Sugar-Free) 100 milliGRAM(s) Oral every 6 hours PRN Cough  melatonin 3 milliGRAM(s) Oral at bedtime PRN Insomnia  ondansetron Injectable 4 milliGRAM(s) IV Push every 8 hours PRN Nausea and/or Vomiting    Allergies  No Known Allergies    Vital Signs Last 24 Hrs  T(C): 36.7 (11 Jan 2024 05:18), Max: 37 (10 Al 2024 15:31)  T(F): 98 (11 Jan 2024 05:18), Max: 98.6 (10 Al 2024 15:31)  HR: 87 (10 Al 2024 15:31) (74 - 87)  BP: 97/59 (11 Jan 2024 05:18) (97/59 - 110/69)  BP(mean): --  RR: 18 (11 Jan 2024 05:18) (18 - 18)  SpO2: 98% (11 Jan 2024 05:18) (98% - 99%)    Parameters below as of 11 Jan 2024 05:18  Patient On (Oxygen Delivery Method): room air    PHYSICAL EXAM:  GENERAL: NAD, comfortable  NECK: Supple; trachea midline  CHEST/LUNG: Clear to auscultation bilaterally; No wheezes  HEART: Regular rate and rhythm; No murmurs, rubs, or gallops  ABDOMEN: Soft, Nontender, Nondistended; Bowel sounds present  NEURO: AAOx3, no tremor  EXTREMITIES:  2+ Peripheral Pulses, no edema      LABS:                        9.0    11.04 )-----------( 238      ( 11 Jan 2024 05:15 )             30.4     01-11    141  |  105  |  36.9<H>  ----------------------------<  87  3.5   |  23.0  |  4.00<H>    Ca    8.6      11 Jan 2024 05:15    TPro  6.2<L>  /  Alb  3.2<L>  /  TBili  <0.2<L>  /  DBili  x   /  AST  18  /  ALT  58<H>  /  AlkPhos  74  01-11    Urinalysis Basic - ( 11 Jan 2024 05:15 )    Color: x / Appearance: x / SG: x / pH: x  Gluc: 87 mg/dL / Ketone: x  / Bili: x / Urobili: x   Blood: x / Protein: x / Nitrite: x   Leuk Esterase: x / RBC: x / WBC x   Sq Epi: x / Non Sq Epi: x / Bacteria: x                Free Thyroxine, Serum: 1.1 ng/dL (01-05-24 @ 06:16)  Thyroid Stimulating Hormone, Serum: 3.55 uIU/mL (01-05-24 @ 06:16)  Thyroid Stimulating Hormone, Serum: 1.99 uIU/mL (01-03-24 @ 06:42)

## 2024-01-12 VITALS
OXYGEN SATURATION: 97 % | SYSTOLIC BLOOD PRESSURE: 95 MMHG | DIASTOLIC BLOOD PRESSURE: 61 MMHG | TEMPERATURE: 98 F | RESPIRATION RATE: 18 BRPM | HEART RATE: 82 BPM

## 2024-01-12 LAB
ALBUMIN SERPL ELPH-MCNC: 3.4 G/DL — SIGNIFICANT CHANGE UP (ref 3.3–5.2)
ALBUMIN SERPL ELPH-MCNC: 3.4 G/DL — SIGNIFICANT CHANGE UP (ref 3.3–5.2)
ALP SERPL-CCNC: 66 U/L — SIGNIFICANT CHANGE UP (ref 40–120)
ALP SERPL-CCNC: 66 U/L — SIGNIFICANT CHANGE UP (ref 40–120)
ALT FLD-CCNC: 50 U/L — HIGH
ALT FLD-CCNC: 50 U/L — HIGH
ANION GAP SERPL CALC-SCNC: 14 MMOL/L — SIGNIFICANT CHANGE UP (ref 5–17)
ANION GAP SERPL CALC-SCNC: 14 MMOL/L — SIGNIFICANT CHANGE UP (ref 5–17)
AST SERPL-CCNC: 25 U/L — SIGNIFICANT CHANGE UP
AST SERPL-CCNC: 25 U/L — SIGNIFICANT CHANGE UP
BASOPHILS # BLD AUTO: 0.08 K/UL — SIGNIFICANT CHANGE UP (ref 0–0.2)
BASOPHILS # BLD AUTO: 0.08 K/UL — SIGNIFICANT CHANGE UP (ref 0–0.2)
BASOPHILS NFR BLD AUTO: 0.7 % — SIGNIFICANT CHANGE UP (ref 0–2)
BASOPHILS NFR BLD AUTO: 0.7 % — SIGNIFICANT CHANGE UP (ref 0–2)
BILIRUB SERPL-MCNC: <0.2 MG/DL — LOW (ref 0.4–2)
BILIRUB SERPL-MCNC: <0.2 MG/DL — LOW (ref 0.4–2)
BUN SERPL-MCNC: 33.7 MG/DL — HIGH (ref 8–20)
BUN SERPL-MCNC: 33.7 MG/DL — HIGH (ref 8–20)
CALCIUM SERPL-MCNC: 8.9 MG/DL — SIGNIFICANT CHANGE UP (ref 8.4–10.5)
CALCIUM SERPL-MCNC: 8.9 MG/DL — SIGNIFICANT CHANGE UP (ref 8.4–10.5)
CHLORIDE SERPL-SCNC: 104 MMOL/L — SIGNIFICANT CHANGE UP (ref 96–108)
CHLORIDE SERPL-SCNC: 104 MMOL/L — SIGNIFICANT CHANGE UP (ref 96–108)
CO2 SERPL-SCNC: 21 MMOL/L — LOW (ref 22–29)
CO2 SERPL-SCNC: 21 MMOL/L — LOW (ref 22–29)
CORTIS AM PEAK SERPL-MCNC: 9.6 UG/DL — SIGNIFICANT CHANGE UP (ref 6–18.4)
CORTIS AM PEAK SERPL-MCNC: 9.6 UG/DL — SIGNIFICANT CHANGE UP (ref 6–18.4)
CREAT SERPL-MCNC: 3.41 MG/DL — HIGH (ref 0.5–1.3)
CREAT SERPL-MCNC: 3.41 MG/DL — HIGH (ref 0.5–1.3)
EGFR: 19 ML/MIN/1.73M2 — LOW
EGFR: 19 ML/MIN/1.73M2 — LOW
EOSINOPHIL # BLD AUTO: 0.41 K/UL — SIGNIFICANT CHANGE UP (ref 0–0.5)
EOSINOPHIL # BLD AUTO: 0.41 K/UL — SIGNIFICANT CHANGE UP (ref 0–0.5)
EOSINOPHIL NFR BLD AUTO: 3.6 % — SIGNIFICANT CHANGE UP (ref 0–6)
EOSINOPHIL NFR BLD AUTO: 3.6 % — SIGNIFICANT CHANGE UP (ref 0–6)
GLUCOSE SERPL-MCNC: 88 MG/DL — SIGNIFICANT CHANGE UP (ref 70–99)
GLUCOSE SERPL-MCNC: 88 MG/DL — SIGNIFICANT CHANGE UP (ref 70–99)
HCT VFR BLD CALC: 30.4 % — LOW (ref 34.5–45)
HCT VFR BLD CALC: 30.4 % — LOW (ref 34.5–45)
HGB BLD-MCNC: 9.1 G/DL — LOW (ref 11.5–15.5)
HGB BLD-MCNC: 9.1 G/DL — LOW (ref 11.5–15.5)
IMM GRANULOCYTES NFR BLD AUTO: 1.6 % — HIGH (ref 0–0.9)
IMM GRANULOCYTES NFR BLD AUTO: 1.6 % — HIGH (ref 0–0.9)
LYMPHOCYTES # BLD AUTO: 29.8 % — SIGNIFICANT CHANGE UP (ref 13–44)
LYMPHOCYTES # BLD AUTO: 29.8 % — SIGNIFICANT CHANGE UP (ref 13–44)
LYMPHOCYTES # BLD AUTO: 3.43 K/UL — HIGH (ref 1–3.3)
LYMPHOCYTES # BLD AUTO: 3.43 K/UL — HIGH (ref 1–3.3)
MCHC RBC-ENTMCNC: 28 PG — SIGNIFICANT CHANGE UP (ref 27–34)
MCHC RBC-ENTMCNC: 28 PG — SIGNIFICANT CHANGE UP (ref 27–34)
MCHC RBC-ENTMCNC: 29.9 GM/DL — LOW (ref 32–36)
MCHC RBC-ENTMCNC: 29.9 GM/DL — LOW (ref 32–36)
MCV RBC AUTO: 93.5 FL — SIGNIFICANT CHANGE UP (ref 80–100)
MCV RBC AUTO: 93.5 FL — SIGNIFICANT CHANGE UP (ref 80–100)
MONOCYTES # BLD AUTO: 1.15 K/UL — HIGH (ref 0–0.9)
MONOCYTES # BLD AUTO: 1.15 K/UL — HIGH (ref 0–0.9)
MONOCYTES NFR BLD AUTO: 10 % — SIGNIFICANT CHANGE UP (ref 2–14)
MONOCYTES NFR BLD AUTO: 10 % — SIGNIFICANT CHANGE UP (ref 2–14)
NEUTROPHILS # BLD AUTO: 6.26 K/UL — SIGNIFICANT CHANGE UP (ref 1.8–7.4)
NEUTROPHILS # BLD AUTO: 6.26 K/UL — SIGNIFICANT CHANGE UP (ref 1.8–7.4)
NEUTROPHILS NFR BLD AUTO: 54.3 % — SIGNIFICANT CHANGE UP (ref 43–77)
NEUTROPHILS NFR BLD AUTO: 54.3 % — SIGNIFICANT CHANGE UP (ref 43–77)
PLATELET # BLD AUTO: 218 K/UL — SIGNIFICANT CHANGE UP (ref 150–400)
PLATELET # BLD AUTO: 218 K/UL — SIGNIFICANT CHANGE UP (ref 150–400)
POTASSIUM SERPL-MCNC: 3.7 MMOL/L — SIGNIFICANT CHANGE UP (ref 3.5–5.3)
POTASSIUM SERPL-MCNC: 3.7 MMOL/L — SIGNIFICANT CHANGE UP (ref 3.5–5.3)
POTASSIUM SERPL-SCNC: 3.7 MMOL/L — SIGNIFICANT CHANGE UP (ref 3.5–5.3)
POTASSIUM SERPL-SCNC: 3.7 MMOL/L — SIGNIFICANT CHANGE UP (ref 3.5–5.3)
PROT SERPL-MCNC: 6 G/DL — LOW (ref 6.6–8.7)
PROT SERPL-MCNC: 6 G/DL — LOW (ref 6.6–8.7)
RBC # BLD: 3.25 M/UL — LOW (ref 3.8–5.2)
RBC # BLD: 3.25 M/UL — LOW (ref 3.8–5.2)
RBC # FLD: 19.5 % — HIGH (ref 10.3–14.5)
RBC # FLD: 19.5 % — HIGH (ref 10.3–14.5)
SODIUM SERPL-SCNC: 139 MMOL/L — SIGNIFICANT CHANGE UP (ref 135–145)
SODIUM SERPL-SCNC: 139 MMOL/L — SIGNIFICANT CHANGE UP (ref 135–145)
WBC # BLD: 11.51 K/UL — HIGH (ref 3.8–10.5)
WBC # BLD: 11.51 K/UL — HIGH (ref 3.8–10.5)
WBC # FLD AUTO: 11.51 K/UL — HIGH (ref 3.8–10.5)
WBC # FLD AUTO: 11.51 K/UL — HIGH (ref 3.8–10.5)

## 2024-01-12 PROCEDURE — 70450 CT HEAD/BRAIN W/O DYE: CPT | Mod: MG

## 2024-01-12 PROCEDURE — 82310 ASSAY OF CALCIUM: CPT

## 2024-01-12 PROCEDURE — 76770 US EXAM ABDO BACK WALL COMP: CPT

## 2024-01-12 PROCEDURE — 76536 US EXAM OF HEAD AND NECK: CPT

## 2024-01-12 PROCEDURE — 82533 TOTAL CORTISOL: CPT

## 2024-01-12 PROCEDURE — 86038 ANTINUCLEAR ANTIBODIES: CPT

## 2024-01-12 PROCEDURE — 83550 IRON BINDING TEST: CPT

## 2024-01-12 PROCEDURE — 86160 COMPLEMENT ANTIGEN: CPT

## 2024-01-12 PROCEDURE — 84432 ASSAY OF THYROGLOBULIN: CPT

## 2024-01-12 PROCEDURE — 36415 COLL VENOUS BLD VENIPUNCTURE: CPT

## 2024-01-12 PROCEDURE — 85027 COMPLETE CBC AUTOMATED: CPT

## 2024-01-12 PROCEDURE — 80307 DRUG TEST PRSMV CHEM ANLYZR: CPT

## 2024-01-12 PROCEDURE — 83605 ASSAY OF LACTIC ACID: CPT

## 2024-01-12 PROCEDURE — 86800 THYROGLOBULIN ANTIBODY: CPT

## 2024-01-12 PROCEDURE — 82962 GLUCOSE BLOOD TEST: CPT

## 2024-01-12 PROCEDURE — 85610 PROTHROMBIN TIME: CPT

## 2024-01-12 PROCEDURE — 84133 ASSAY OF URINE POTASSIUM: CPT

## 2024-01-12 PROCEDURE — 84145 PROCALCITONIN (PCT): CPT

## 2024-01-12 PROCEDURE — 82436 ASSAY OF URINE CHLORIDE: CPT

## 2024-01-12 PROCEDURE — 82728 ASSAY OF FERRITIN: CPT

## 2024-01-12 PROCEDURE — 84439 ASSAY OF FREE THYROXINE: CPT

## 2024-01-12 PROCEDURE — 86706 HEP B SURFACE ANTIBODY: CPT

## 2024-01-12 PROCEDURE — 86850 RBC ANTIBODY SCREEN: CPT

## 2024-01-12 PROCEDURE — 84443 ASSAY THYROID STIM HORMONE: CPT

## 2024-01-12 PROCEDURE — 87340 HEPATITIS B SURFACE AG IA: CPT

## 2024-01-12 PROCEDURE — 99239 HOSP IP/OBS DSCHRG MGMT >30: CPT

## 2024-01-12 PROCEDURE — 85730 THROMBOPLASTIN TIME PARTIAL: CPT

## 2024-01-12 PROCEDURE — 71045 X-RAY EXAM CHEST 1 VIEW: CPT

## 2024-01-12 PROCEDURE — 83735 ASSAY OF MAGNESIUM: CPT

## 2024-01-12 PROCEDURE — 99232 SBSQ HOSP IP/OBS MODERATE 35: CPT

## 2024-01-12 PROCEDURE — 84702 CHORIONIC GONADOTROPIN TEST: CPT

## 2024-01-12 PROCEDURE — 81001 URINALYSIS AUTO W/SCOPE: CPT

## 2024-01-12 PROCEDURE — 99223 1ST HOSP IP/OBS HIGH 75: CPT

## 2024-01-12 PROCEDURE — 74176 CT ABD & PELVIS W/O CONTRAST: CPT

## 2024-01-12 PROCEDURE — 86376 MICROSOMAL ANTIBODY EACH: CPT

## 2024-01-12 PROCEDURE — T1013: CPT

## 2024-01-12 PROCEDURE — 84100 ASSAY OF PHOSPHORUS: CPT

## 2024-01-12 PROCEDURE — 86900 BLOOD TYPING SEROLOGIC ABO: CPT

## 2024-01-12 PROCEDURE — 93005 ELECTROCARDIOGRAM TRACING: CPT

## 2024-01-12 PROCEDURE — 74182 MRI ABDOMEN W/CONTRAST: CPT

## 2024-01-12 PROCEDURE — 86162 COMPLEMENT TOTAL (CH50): CPT

## 2024-01-12 PROCEDURE — 86705 HEP B CORE ANTIBODY IGM: CPT

## 2024-01-12 PROCEDURE — 83970 ASSAY OF PARATHORMONE: CPT

## 2024-01-12 PROCEDURE — 82024 ASSAY OF ACTH: CPT

## 2024-01-12 PROCEDURE — 83540 ASSAY OF IRON: CPT

## 2024-01-12 PROCEDURE — 85025 COMPLETE CBC W/AUTO DIFF WBC: CPT

## 2024-01-12 PROCEDURE — 80053 COMPREHEN METABOLIC PANEL: CPT

## 2024-01-12 PROCEDURE — 84300 ASSAY OF URINE SODIUM: CPT

## 2024-01-12 PROCEDURE — 86803 HEPATITIS C AB TEST: CPT

## 2024-01-12 PROCEDURE — 82340 ASSAY OF CALCIUM IN URINE: CPT

## 2024-01-12 PROCEDURE — 99285 EMERGENCY DEPT VISIT HI MDM: CPT | Mod: 25

## 2024-01-12 PROCEDURE — 84466 ASSAY OF TRANSFERRIN: CPT

## 2024-01-12 PROCEDURE — 86901 BLOOD TYPING SEROLOGIC RH(D): CPT

## 2024-01-12 PROCEDURE — 82550 ASSAY OF CK (CPK): CPT

## 2024-01-12 PROCEDURE — 93306 TTE W/DOPPLER COMPLETE: CPT

## 2024-01-12 PROCEDURE — 82272 OCCULT BLD FECES 1-3 TESTS: CPT

## 2024-01-12 PROCEDURE — 0225U NFCT DS DNA&RNA 21 SARSCOV2: CPT

## 2024-01-12 PROCEDURE — G1004: CPT

## 2024-01-12 PROCEDURE — 96374 THER/PROPH/DIAG INJ IV PUSH: CPT

## 2024-01-12 PROCEDURE — 83520 IMMUNOASSAY QUANT NOS NONAB: CPT

## 2024-01-12 PROCEDURE — 85045 AUTOMATED RETICULOCYTE COUNT: CPT

## 2024-01-12 PROCEDURE — 87040 BLOOD CULTURE FOR BACTERIA: CPT

## 2024-01-12 PROCEDURE — 82330 ASSAY OF CALCIUM: CPT

## 2024-01-12 PROCEDURE — 80048 BASIC METABOLIC PNL TOTAL CA: CPT

## 2024-01-12 PROCEDURE — 83935 ASSAY OF URINE OSMOLALITY: CPT

## 2024-01-12 RX ORDER — CALCIUM CARBONATE 500(1250)
1 TABLET ORAL
Qty: 60 | Refills: 0
Start: 2024-01-12 | End: 2024-02-10

## 2024-01-12 RX ORDER — FOLIC ACID 0.8 MG
1 TABLET ORAL
Qty: 30 | Refills: 0
Start: 2024-01-12 | End: 2024-02-10

## 2024-01-12 RX ORDER — SODIUM BICARBONATE 1 MEQ/ML
1 SYRINGE (ML) INTRAVENOUS
Qty: 90 | Refills: 0
Start: 2024-01-12 | End: 2024-02-10

## 2024-01-12 RX ORDER — LEVOTHYROXINE SODIUM 125 MCG
1 TABLET ORAL
Qty: 30 | Refills: 1
Start: 2024-01-12 | End: 2024-03-11

## 2024-01-12 RX ORDER — FLUDROCORTISONE ACETATE 0.1 MG/1
1 TABLET ORAL
Qty: 30 | Refills: 0
Start: 2024-01-12 | End: 2024-02-10

## 2024-01-12 RX ORDER — CALCITRIOL 0.5 UG/1
1 CAPSULE ORAL
Qty: 30 | Refills: 1
Start: 2024-01-12 | End: 2024-03-11

## 2024-01-12 RX ADMIN — SODIUM CHLORIDE 3 MILLILITER(S): 9 INJECTION INTRAMUSCULAR; INTRAVENOUS; SUBCUTANEOUS at 06:12

## 2024-01-12 RX ADMIN — Medication 1 MILLIGRAM(S): at 12:44

## 2024-01-12 RX ADMIN — Medication 1 TABLET(S): at 12:44

## 2024-01-12 RX ADMIN — Medication 25 MICROGRAM(S): at 06:10

## 2024-01-12 RX ADMIN — PANTOPRAZOLE SODIUM 40 MILLIGRAM(S): 20 TABLET, DELAYED RELEASE ORAL at 06:11

## 2024-01-12 RX ADMIN — Medication 1 TABLET(S): at 06:10

## 2024-01-12 RX ADMIN — Medication 650 MILLIGRAM(S): at 12:44

## 2024-01-12 RX ADMIN — FLUDROCORTISONE ACETATE 0.1 MILLIGRAM(S): 0.1 TABLET ORAL at 06:10

## 2024-01-12 RX ADMIN — SODIUM CHLORIDE 3 MILLILITER(S): 9 INJECTION INTRAMUSCULAR; INTRAVENOUS; SUBCUTANEOUS at 12:26

## 2024-01-12 RX ADMIN — Medication 650 MILLIGRAM(S): at 06:10

## 2024-01-12 RX ADMIN — CALCITRIOL 0.25 MICROGRAM(S): 0.5 CAPSULE ORAL at 12:44

## 2024-01-12 NOTE — PROGRESS NOTE ADULT - PROVIDER SPECIALTY LIST ADULT
Endocrinology
Hospitalist
Hospitalist
Nephrology
Nephrology
Endocrinology
Hospitalist
Nephrology
Endocrinology
Hospitalist
Nephrology
Hospitalist
Nephrology
Endocrinology
Nephrology
Nephrology
Hospitalist
Nephrology
Nephrology
Hospitalist
Cardiology

## 2024-01-12 NOTE — DISCHARGE NOTE NURSING/CASE MANAGEMENT/SOCIAL WORK - PATIENT PORTAL LINK FT
You can access the FollowMyHealth Patient Portal offered by [facility] by registering at the following website: [email or web address]. By joining [employer]’s FollowMyHealth portal, you will also be able to view your health information using other applications (apps) compatible with our system. You can access the FollowMyHealth Patient Portal offered by [facility] by registering at the following website: [email or web address]. By joining [employer]’s FollowMyHealth portal, you will also be able to view your health information using other applications (apps) compatible with our system.

## 2024-01-12 NOTE — DISCHARGE NOTE NURSING/CASE MANAGEMENT/SOCIAL WORK - NSDCPEFALRISK_GEN_ALL_CORE
For information on Fall & Injury Prevention, visit: [email or web address] OR  [email or web address] OR  https://www.cdc.gov/steadi/patient.html For information on Fall & Injury Prevention, visit: [email or web address] OR  [email or web address] OR  https://www.cdc.gov/steadi/patient.html

## 2024-01-12 NOTE — PROGRESS NOTE ADULT - ASSESSMENT
CKD stage IV baseline of 2.5  Follows w/ Dr Salas  , but has poor compliance w follow up in our office  +COVID  MARIVEL (?) COVID nephrotoxicity (?) ATN due to hypotension  Renal function slowly resolving     M.A. --> better, cont po bicab    CT scan: Atrophic kidneys with bilateral indeterminate renal rené  -  MRI abdomen does not show renal mass     Hypotension: r/o adrenal insufficiency  - Hydrocortisone now weaned off--> Endocrine noted; ACTH stim test to r/o adrenal insufficiency  - cont Florinef    RO: iPTH 73  - cont CaCO3 and Rocaltrol    Anemia: +CKD + low Fe  - cont MARY , S/P IV iron ---> Can arrange Epogen in my office   - target Hgb > 10.0    Cleared from a renal perspective for Discharge  Can follow up with Dr Salas in 1 weeks as an outpatient   Await final word from Endocrine

## 2024-01-12 NOTE — DISCHARGE NOTE PROVIDER - NSDCCPCAREPLAN_GEN_ALL_CORE_FT
PRINCIPAL DISCHARGE DIAGNOSIS  Diagnosis: Acute on chronic renal failure  Assessment and Plan of Treatment: Seen by nephrology and reached a new stable baseline. Follow up with nephrology outpatient.      SECONDARY DISCHARGE DIAGNOSES  Diagnosis: Renal mass  Assessment and Plan of Treatment: MR done and follow up outpatient with PCP and Nephrology/Endocrinology    Diagnosis: Hypotension  Assessment and Plan of Treatment: Seen by endocrinology and started on fludrocortisone.    Diagnosis: Adrenal insufficiency  Assessment and Plan of Treatment: Seen by Endocrinology and follow up outpatient for further workup.     PRINCIPAL DISCHARGE DIAGNOSIS  Diagnosis: Acute on chronic renal failure  Assessment and Plan of Treatment: Seen by nephrology and reached a new stable baseline. Follow up with nephrology outpatient.      SECONDARY DISCHARGE DIAGNOSES  Diagnosis: Renal mass  Assessment and Plan of Treatment: MR done and follow up outpatient with PCP and Nephrology/Endocrinology    Diagnosis: Hypotension  Assessment and Plan of Treatment: Seen by endocrinology and started on fludrocortisone.    Diagnosis: Adrenal insufficiency  Assessment and Plan of Treatment: Seen by Endocrinology and follow up outpatient for further workup.    Diagnosis: Secondary hemochromatosis  Assessment and Plan of Treatment: MRI shows iron deposition in liver and spleen. Follow up heme/onc for further workup.

## 2024-01-12 NOTE — PROGRESS NOTE ADULT - NS ATTEND OPT1 GEN_ALL_CORE
I independently performed the documented:
I attest my time as attending is greater than 50% of the total combined time spent on qualifying patient care activities by the PA/NP and attending.
I independently performed the documented:
I independently performed the documented:

## 2024-01-12 NOTE — CONSULT NOTE ADULT - CONSULT REQUESTED BY NAME
meme
pt
Patricia Alves PA-C
Dr. Mathew
Dr. Mathew
Qbrexza Counseling:  I discussed with the patient the risks of Qbrexza including but not limited to headache, mydriasis, blurred vision, dry eyes, nasal dryness, dry mouth, dry throat, dry skin, urinary hesitation, and constipation.  Local skin reactions including erythema, burning, stinging, and itching can also occur.

## 2024-01-12 NOTE — PROGRESS NOTE ADULT - ASSESSMENT
23F Salvadorean speaking, w/ hypothyroidism and ckd brought in for AMS at a Protestant event. Patient was apparently in her normal state of health at a Alevism event and shortly after eating rice/beans and meat started c/o abdominal pain and cramping and then vomited. Friends who witnessed it said they called EMS due to patient being very agitated. No hx of drug use. She has a hx of "kidney problems" and used to be on medications for it but self discontinued " a while ago." In the field Patient given Versed and then Ativan in ER to allow for exam and labs. Labs with Hbg of 8.6 and Cr. of 3.95 with reported baseline of 2.5. No reported mental health history. Admitted for acute metabolic encephalopathy with MARIVEL on CKD. Found to have COVID. Course complicated by high grade fever, hypotension. Consult for adrenal insufficiency    1. Hypotension- BP soft but stable, on Florinef  - Initial AM cortisol not accurate because pt was already on Hydrocortisone  - Hydrocortisone last dose was 1/6 am  - AM Cortisol on 1/8 low  - ACTH stimulation test acceptable  - Repeat AM cortisol pending today, will f/u results    2. Hypothyroidism  - TSH 3.55, FT4 1.1  - TPO ab negative  - Thyroid ultrasound is normal  - Continue with home dose LT4 25 mcg   23F Guinean speaking, w/ hypothyroidism and ckd brought in for AMS at a Rastafarian event. Patient was apparently in her normal state of health at a Evangelical event and shortly after eating rice/beans and meat started c/o abdominal pain and cramping and then vomited. Friends who witnessed it said they called EMS due to patient being very agitated. No hx of drug use. She has a hx of "kidney problems" and used to be on medications for it but self discontinued " a while ago." In the field Patient given Versed and then Ativan in ER to allow for exam and labs. Labs with Hbg of 8.6 and Cr. of 3.95 with reported baseline of 2.5. No reported mental health history. Admitted for acute metabolic encephalopathy with MARIVEL on CKD. Found to have COVID. Course complicated by high grade fever, hypotension. Consult for adrenal insufficiency    1. Hypotension- BP soft but stable, on Florinef  - Initial AM cortisol not accurate because pt was already on Hydrocortisone  - Hydrocortisone last dose was 1/6 am  - AM Cortisol on 1/8 low  - ACTH stimulation test acceptable  - Repeat AM cortisol pending today, will f/u results    2. Hypothyroidism  - TSH 3.55, FT4 1.1  - TPO ab negative  - Thyroid ultrasound is normal  - Continue with home dose LT4 25 mcg   23F Vietnamese speaking, w/ hypothyroidism and ckd brought in for AMS at a [employer] event. Patient was apparently in her normal state of health at a [employer] event and shortly after eating rice/beans and meat started c/o abdominal pain and cramping and then vomited. Friends who witnessed it said they called EMS due to patient being very agitated. No hx of drug use. She has a hx of "kidney problems" and used to be on medications for it but self discontinued " a while ago." In the field Patient given Versed and then Ativan in ER to allow for exam and labs. Labs with Hbg of 8.6 and Cr. of 3.95 with reported baseline of 2.5. No reported mental health history. Admitted for acute metabolic encephalopathy with MARIVEL on CKD. Found to have COVID. Course complicated by high grade fever, hypotension. Consult for adrenal insufficiency    1. Hypotension- BP soft but stable, on Florinef  - Initial AM cortisol not accurate because pt was already on Hydrocortisone  - Hydrocortisone last dose was 1/6 am  - AM Cortisol on 1/8 low  - ACTH stimulation test acceptable  - Repeat AM cortisol today wnl, 9.6    2. Hypothyroidism  - TSH 3.55, FT4 1.1  - TPO ab negative  - Thyroid ultrasound is normal  - Continue with home dose LT4 25 mcg   23F Mohawk speaking, w/ hypothyroidism and ckd brought in for AMS at a Taoist event. Patient was apparently in her normal state of health at a Hinduism event and shortly after eating rice/beans and meat started c/o abdominal pain and cramping and then vomited. Friends who witnessed it said they called EMS due to patient being very agitated. No hx of drug use. She has a hx of "kidney problems" and used to be on medications for it but self discontinued " a while ago." In the field Patient given Versed and then Ativan in ER to allow for exam and labs. Labs with Hbg of 8.6 and Cr. of 3.95 with reported baseline of 2.5. No reported mental health history. Admitted for acute metabolic encephalopathy with MARIVEL on CKD. Found to have COVID. Course complicated by high grade fever, hypotension. Consult for adrenal insufficiency    1. Hypotension- BP soft but stable, on Florinef  - Initial AM cortisol not accurate because pt was already on Hydrocortisone  - Hydrocortisone last dose was 1/6 am  - AM Cortisol on 1/8 low  - ACTH stimulation test acceptable  - Repeat AM cortisol today wnl, 9.6    2. Hypothyroidism  - TSH 3.55, FT4 1.1  - TPO ab negative  - Thyroid ultrasound is normal  - Continue with home dose LT4 25 mcg

## 2024-01-12 NOTE — PROGRESS NOTE ADULT - NS ATTEND AMEND GEN_ALL_CORE FT
I agree with plan above and discussed with NP.  Cortisol am pending.   She has no insurance and she cannot afford medication.   Very low GFr

## 2024-01-12 NOTE — CONSULT NOTE ADULT - SUBJECTIVE AND OBJECTIVE BOX
Hematology Consult Note    HPI:  22 y/o female whose real name is Trinh Clark. History obtained from 2 Buddhism friends at bedside who arrived after Patient brought from field to ED for c/o acting strange at a Buddhism event. Language line not working at this time, History obtained from ED chart and 2 friends who know patient peripherally. Patient was apparently in her normal state of health at a Buddhism event and shortly after eating rice/beans and meat started c/o abdominal pain and cramping and then vomited. Friends who witnessed it said they called EMS due to patient being very agitated. No one else reportedly was sick from eating the same food. No recent illnesses, travel, or sick contacts reported. Patient has never used illicit drugs and does not use alcohol. She has a hx of "kidney problems" and used to be on medications for it but self discontinued " a while ago." In th field Patient given Versed and then Ativan in ED to allow for exam and labs. Vitals with tachycardia/tachypnea on arrival due to agitation. ED attending states patient denying any trauma, or assault. No obvious injury on exam. Labs with Hbg of 8.6 and Cr. of 3.95 with reported baseline of 2.5. No reported mental health history.     : Tita Clark [phone]-No answer at this time  (2024 05:57)      Allergies    No Known Allergies    Intolerances        MEDICATIONS  (STANDING):  acetaminophen   IVPB .. 1000 milliGRAM(s) IV Intermittent once  calcitriol   Capsule 0.25 MICROGram(s) Oral daily  calcium carbonate    500 mG (Tums) Chewable 1 Tablet(s) Chew two times a day  epoetin dev (PROCRIT) Injectable 64888 Unit(s) SubCutaneous every 7 days  fludroCORTISONE 0.1 milliGRAM(s) Oral daily  folic acid 1 milliGRAM(s) Oral daily  levothyroxine 25 MICROGram(s) Oral daily  multivitamin 1 Tablet(s) Oral daily  pantoprazole    Tablet 40 milliGRAM(s) Oral before breakfast  sodium bicarbonate 650 milliGRAM(s) Oral three times a day  sodium chloride 0.45% 1000 milliLiter(s) (100 mL/Hr) IV Continuous <Continuous>  sodium chloride 0.9% lock flush 3 milliLiter(s) IV Push every 8 hours    MEDICATIONS  (PRN):  acetaminophen     Tablet .. 650 milliGRAM(s) Oral every 6 hours PRN Temp greater or equal to 38C (100.4F), Mild Pain (1 - 3)  albuterol    90 MICROgram(s) HFA Inhaler 2 Puff(s) Inhalation every 6 hours PRN Shortness of Breath and/or Wheezing  aluminum hydroxide/magnesium hydroxide/simethicone Suspension 30 milliLiter(s) Oral every 4 hours PRN Dyspepsia  guaiFENesin Oral Liquid (Sugar-Free) 100 milliGRAM(s) Oral every 6 hours PRN Cough  melatonin 3 milliGRAM(s) Oral at bedtime PRN Insomnia  ondansetron Injectable 4 milliGRAM(s) IV Push every 8 hours PRN Nausea and/or Vomiting      PAST MEDICAL & SURGICAL HISTORY:  Chronic kidney disease, unspecified CKD stage      Hyperthyroidism      Hyperthyroidism      H/O  section  x2          FAMILY HISTORY:      SOCIAL HISTORY: No EtOH, no tobacco    REVIEW OF SYSTEMS:    CONSTITUTIONAL: stated had fver for 1week prior to admission otherwise in good state of health   EYES/ENT: No visual changes;  No vertigo or throat pain   NECK: No pain or stiffness  RESPIRATORY: No cough, wheezing, hemoptysis; No shortness of breath  CARDIOVASCULAR: No chest pain or palpitations  GASTROINTESTINAL: No abdominal or epigastric pain. No nausea, vomiting, or hematemesis; No diarrhea or constipation. No melena or hematochezia.  GENITOURINARY: No dysuria, frequency or hematuria  NEUROLOGICAL: No numbness or weakness  SKIN: No itching, burning, rashes, or lesions   All other review of systems is negative unless indicated above.        T(F): 97.8 (24 @ 11:11), Max: 98.5 (24 @ 05:18)  HR: 82 (24 @ 11:11)  BP: 95/61 (24 @ 11:11)  RR: 18 (24 @ 11:11)  SpO2: 97% (24 @ 11:11)  Wt(kg): --    GENERAL: NAD, well-developed  HEAD:  Atraumatic, Normocephalic  EYES: EOMI, PERRLA, conjunctiva and sclera clear  NECK: Supple, No JVD  CHEST/LUNG: Clear to auscultation bilaterally; No wheeze  HEART: Regular rate and rhythm; No murmurs, rubs, or gallops  ABDOMEN: Soft, Nontender, Nondistended; Bowel sounds present  EXTREMITIES:  2+ Peripheral Pulses, No clubbing, cyanosis, or edema  NEUROLOGY: non-focal  SKIN: No rashes or lesions                          9.1    11.51 )-----------( 218      ( 2024 05:15 )             30.4           139  |  104  |  33.7<H>  ----------------------------<  88  3.7   |  21.0<L>  |  3.41<H>    Ca    8.9      2024 05:15    TPro  6.0<L>  /  Alb  3.4  /  TBili  <0.2<L>  /  DBili  x   /  AST  25  /  ALT  50<H>  /  AlkPhos  66          < from: CT Abdomen and Pelvis No Cont (24 @ 11:59) >  ACC: [id number] EXAM:  CT ABDOMEN AND PELVIS   ORDERED BY: MAGALI NGUYEN     PROCEDURE DATE:  2024          INTERPRETATION:  CLINICAL INFORMATION: Acute kidney injury, abnormal   renal ultrasound.    COMPARISON: Ultrasound 2024.    CONTRAST/COMPLICATIONS:  IV Contrast: None  Oral Contrast: None  Complications: None reported    PROCEDURE:  CT of the Abdomen and Pelvis was performed.  Sagittal and coronal reformats were performed.    FINDINGS:    Evaluation of the solid organs and vasculature is limited in the absence   of intravenous contrast.  LOWER CHEST: Cardiomegaly.    LIVER: Within normal limits.  BILE DUCTS: Normal caliber.  GALLBLADDER: Within normal limits.  SPLEEN: Within normal limits.  PANCREAS: Within normal limits.  ADRENALS: Within normal limits.  KIDNEYS/URETERS: Both kidneys are atrophic with multiple areas of   scarring as well as prominent/masslike areas, measuring up to 2.9 cm in   the right upper kidney, corresponding to that seen on ultrasound. Similar   masslike finding noted in the left upper pole, measuring 2.7 cm. These   are indeterminate without IV contrast. No renal calculi or hydronephrosis.    BLADDER: Within normal limits.  REPRODUCTIVE ORGANS: Uterus and adnexa within normal limits.    BOWEL: Nobowel obstruction. Appendix is normal.  PERITONEUM: No ascites.  VESSELS: Within normal limits.  RETROPERITONEUM/LYMPH NODES: Mildly prominent para-aortic nodes measure   up to 1.6 x 1.2 cm (3, 73) with suggestion of fatty hilum.  ABDOMINAL WALL: Within normal limits.  BONES: Within normal limits.    IMPRESSION:  Atrophic kidneys with bilateral indeterminate renal masses, which cannot   be further characterized without contrast. Consider contrast enhanced CT   or MRI when clinically feasible.    Mildly prominent retroperitoneal lymph nodes.    Cardiomegaly    --- End of Report ---      < from: MR Abdomen w/ IV Cont (24 @ 15:24) >  ACC: [id number] EXAM:  MR ABDOMEN IC   ORDERED BY: JANET SMART     PROCEDURE DATE:  2024          INTERPRETATION:  CLINICAL INFORMATION: Chronic kidney disease with renal   mass    COMPARISON: CT abdomen and pelvis 2024    CONTRAST/COMPLICATIONS:  IV Contrast: Gadavist  5 cc administered   2.5 cc discarded  Oral Contrast: NONE  Complications: None reported at time of study completion    PROCEDURE:  MRI of the abdomen was performed.    FINDINGS:  LOWER CHEST: Clear.    LIVER: Diffuse signal changes of iron deposition  BILE DUCTS: Nondilated.  GALLBLADDER: Normal.  SPLEEN: Normal size. Diffuse signal changes of iron deposition  PANCREAS: Normal.  ADRENALS: Normal.  KIDNEYS/URETERS: Extensive bilateral renal cortical lobulationand   scarring with bilateral atrophy. No discrete solid renal mass. A few   small renal cysts measuring up to 1.2 cm on the right. No hydronephrosis   or perinephric abnormality.    VISUALIZED PORTIONS:  BOWEL: No bowel-related abnormality.  PERITONEUM: No ascites.  VESSELS: Normal caliber aorta.  RETROPERITONEUM/LYMPH NODES: No adenopathy.  ABDOMINAL WALL: Normal.  BONES: No aggressive lesion.    IMPRESSION:  *  Extensive bilateral asymmetric renal cortical scarring and atrophy   resulting in distorted and lobulated appearance of the renal contour   accounting for of the masslike appearance on prior CT. No evidence of   renal mass.  *  Diffuse signal changes of iron deposition in the liver and spleen.   Correlate for secondary hemochromatosis.      --- End of Report ---             Hematology Consult Note    HPI:  22 y/o female whose real name is Trinh Clark. History obtained from 2 Orthodoxy friends at bedside who arrived after Patient brought from field to ED for c/o acting strange at a Orthodoxy event. Language line not working at this time, History obtained from ED chart and 2 friends who know patient peripherally. Patient was apparently in her normal state of health at a Orthodoxy event and shortly after eating rice/beans and meat started c/o abdominal pain and cramping and then vomited. Friends who witnessed it said they called EMS due to patient being very agitated. No one else reportedly was sick from eating the same food. No recent illnesses, travel, or sick contacts reported. Patient has never used illicit drugs and does not use alcohol. She has a hx of "kidney problems" and used to be on medications for it but self discontinued " a while ago." In th field Patient given Versed and then Ativan in ED to allow for exam and labs. Vitals with tachycardia/tachypnea on arrival due to agitation. ED attending states patient denying any trauma, or assault. No obvious injury on exam. Labs with Hbg of 8.6 and Cr. of 3.95 with reported baseline of 2.5. No reported mental health history.     : Tita Clark [phone]-No answer at this time  (2024 05:57)      Allergies    No Known Allergies    Intolerances        MEDICATIONS  (STANDING):  acetaminophen   IVPB .. 1000 milliGRAM(s) IV Intermittent once  calcitriol   Capsule 0.25 MICROGram(s) Oral daily  calcium carbonate    500 mG (Tums) Chewable 1 Tablet(s) Chew two times a day  epoetin dev (PROCRIT) Injectable 85544 Unit(s) SubCutaneous every 7 days  fludroCORTISONE 0.1 milliGRAM(s) Oral daily  folic acid 1 milliGRAM(s) Oral daily  levothyroxine 25 MICROGram(s) Oral daily  multivitamin 1 Tablet(s) Oral daily  pantoprazole    Tablet 40 milliGRAM(s) Oral before breakfast  sodium bicarbonate 650 milliGRAM(s) Oral three times a day  sodium chloride 0.45% 1000 milliLiter(s) (100 mL/Hr) IV Continuous <Continuous>  sodium chloride 0.9% lock flush 3 milliLiter(s) IV Push every 8 hours    MEDICATIONS  (PRN):  acetaminophen     Tablet .. 650 milliGRAM(s) Oral every 6 hours PRN Temp greater or equal to 38C (100.4F), Mild Pain (1 - 3)  albuterol    90 MICROgram(s) HFA Inhaler 2 Puff(s) Inhalation every 6 hours PRN Shortness of Breath and/or Wheezing  aluminum hydroxide/magnesium hydroxide/simethicone Suspension 30 milliLiter(s) Oral every 4 hours PRN Dyspepsia  guaiFENesin Oral Liquid (Sugar-Free) 100 milliGRAM(s) Oral every 6 hours PRN Cough  melatonin 3 milliGRAM(s) Oral at bedtime PRN Insomnia  ondansetron Injectable 4 milliGRAM(s) IV Push every 8 hours PRN Nausea and/or Vomiting      PAST MEDICAL & SURGICAL HISTORY:  Chronic kidney disease, unspecified CKD stage      Hyperthyroidism      Hyperthyroidism      H/O  section  x2          FAMILY HISTORY:      SOCIAL HISTORY: No EtOH, no tobacco    REVIEW OF SYSTEMS:    CONSTITUTIONAL: stated had fver for 1week prior to admission otherwise in good state of health   EYES/ENT: No visual changes;  No vertigo or throat pain   NECK: No pain or stiffness  RESPIRATORY: No cough, wheezing, hemoptysis; No shortness of breath  CARDIOVASCULAR: No chest pain or palpitations  GASTROINTESTINAL: No abdominal or epigastric pain. No nausea, vomiting, or hematemesis; No diarrhea or constipation. No melena or hematochezia.  GENITOURINARY: No dysuria, frequency or hematuria  NEUROLOGICAL: No numbness or weakness  SKIN: No itching, burning, rashes, or lesions   All other review of systems is negative unless indicated above.        T(F): 97.8 (24 @ 11:11), Max: 98.5 (24 @ 05:18)  HR: 82 (24 @ 11:11)  BP: 95/61 (24 @ 11:11)  RR: 18 (24 @ 11:11)  SpO2: 97% (24 @ 11:11)  Wt(kg): --    GENERAL: NAD, well-developed  HEAD:  Atraumatic, Normocephalic  EYES: EOMI, PERRLA, conjunctiva and sclera clear  NECK: Supple, No JVD  CHEST/LUNG: Clear to auscultation bilaterally; No wheeze  HEART: Regular rate and rhythm; No murmurs, rubs, or gallops  ABDOMEN: Soft, Nontender, Nondistended; Bowel sounds present  EXTREMITIES:  2+ Peripheral Pulses, No clubbing, cyanosis, or edema  NEUROLOGY: non-focal  SKIN: No rashes or lesions                          9.1    11.51 )-----------( 218      ( 2024 05:15 )             30.4           139  |  104  |  33.7<H>  ----------------------------<  88  3.7   |  21.0<L>  |  3.41<H>    Ca    8.9      2024 05:15    TPro  6.0<L>  /  Alb  3.4  /  TBili  <0.2<L>  /  DBili  x   /  AST  25  /  ALT  50<H>  /  AlkPhos  66          < from: CT Abdomen and Pelvis No Cont (24 @ 11:59) >  ACC: [id number] EXAM:  CT ABDOMEN AND PELVIS   ORDERED BY: MAGALI NGUYEN     PROCEDURE DATE:  2024          INTERPRETATION:  CLINICAL INFORMATION: Acute kidney injury, abnormal   renal ultrasound.    COMPARISON: Ultrasound 2024.    CONTRAST/COMPLICATIONS:  IV Contrast: None  Oral Contrast: None  Complications: None reported    PROCEDURE:  CT of the Abdomen and Pelvis was performed.  Sagittal and coronal reformats were performed.    FINDINGS:    Evaluation of the solid organs and vasculature is limited in the absence   of intravenous contrast.  LOWER CHEST: Cardiomegaly.    LIVER: Within normal limits.  BILE DUCTS: Normal caliber.  GALLBLADDER: Within normal limits.  SPLEEN: Within normal limits.  PANCREAS: Within normal limits.  ADRENALS: Within normal limits.  KIDNEYS/URETERS: Both kidneys are atrophic with multiple areas of   scarring as well as prominent/masslike areas, measuring up to 2.9 cm in   the right upper kidney, corresponding to that seen on ultrasound. Similar   masslike finding noted in the left upper pole, measuring 2.7 cm. These   are indeterminate without IV contrast. No renal calculi or hydronephrosis.    BLADDER: Within normal limits.  REPRODUCTIVE ORGANS: Uterus and adnexa within normal limits.    BOWEL: Nobowel obstruction. Appendix is normal.  PERITONEUM: No ascites.  VESSELS: Within normal limits.  RETROPERITONEUM/LYMPH NODES: Mildly prominent para-aortic nodes measure   up to 1.6 x 1.2 cm (3, 73) with suggestion of fatty hilum.  ABDOMINAL WALL: Within normal limits.  BONES: Within normal limits.    IMPRESSION:  Atrophic kidneys with bilateral indeterminate renal masses, which cannot   be further characterized without contrast. Consider contrast enhanced CT   or MRI when clinically feasible.    Mildly prominent retroperitoneal lymph nodes.    Cardiomegaly    --- End of Report ---      < from: MR Abdomen w/ IV Cont (24 @ 15:24) >  ACC: [id number] EXAM:  MR ABDOMEN IC   ORDERED BY: JANET SMART     PROCEDURE DATE:  2024          INTERPRETATION:  CLINICAL INFORMATION: Chronic kidney disease with renal   mass    COMPARISON: CT abdomen and pelvis 2024    CONTRAST/COMPLICATIONS:  IV Contrast: Gadavist  5 cc administered   2.5 cc discarded  Oral Contrast: NONE  Complications: None reported at time of study completion    PROCEDURE:  MRI of the abdomen was performed.    FINDINGS:  LOWER CHEST: Clear.    LIVER: Diffuse signal changes of iron deposition  BILE DUCTS: Nondilated.  GALLBLADDER: Normal.  SPLEEN: Normal size. Diffuse signal changes of iron deposition  PANCREAS: Normal.  ADRENALS: Normal.  KIDNEYS/URETERS: Extensive bilateral renal cortical lobulationand   scarring with bilateral atrophy. No discrete solid renal mass. A few   small renal cysts measuring up to 1.2 cm on the right. No hydronephrosis   or perinephric abnormality.    VISUALIZED PORTIONS:  BOWEL: No bowel-related abnormality.  PERITONEUM: No ascites.  VESSELS: Normal caliber aorta.  RETROPERITONEUM/LYMPH NODES: No adenopathy.  ABDOMINAL WALL: Normal.  BONES: No aggressive lesion.    IMPRESSION:  *  Extensive bilateral asymmetric renal cortical scarring and atrophy   resulting in distorted and lobulated appearance of the renal contour   accounting for of the masslike appearance on prior CT. No evidence of   renal mass.  *  Diffuse signal changes of iron deposition in the liver and spleen.   Correlate for secondary hemochromatosis.      --- End of Report ---

## 2024-01-12 NOTE — DISCHARGE NOTE PROVIDER - CARE PROVIDERS DIRECT ADDRESSES
,DirectAddress_Unknown,[email or web address] ,[email or web address]

## 2024-01-12 NOTE — CONSULT NOTE ADULT - ASSESSMENT
22 y/o female with hx of HTN, Hypothyroidism, Anemia, CKD stage 4/ atrophic kidney disease seen by Dr. Melvin Salas ([phone]). Brought to [facility] w/ AMS found to be covid (+)/ metabolic encephalopathy. Patient being w/u MRI showing iron deposits in liver and spleen ?hemochromatoses     CT A/P 1/1/24-Atrophic kidneys with bilateral indeterminate renal masses,, which cannot be further characterized without contrast, Mildly prominent retroperitoneal lymph nodes, Cardiomegaly  MRI A/P 1/11/24-Extensive bilateral asymmetric renal cortical scarring and atrophy resulting in distorted and lobulated appearance of the renal contour accounting for of the masslike appearance on prior CT. No evidence of renal mass, Diffuse signal changes of iron deposition in the liver and spleen. ? secondary hemochromatosis.    Iron with Total Binding Capacity in AM (01.02.24 @ 02:49)   % Saturation, Iron: 7 %  Iron - Total Binding Capacity.: 243 ug/dL  Iron Total: 16 ug/dLFerritin in AM (01.02.24 @ 02:49)   Ferritin: 77 ng/mL  Thyroid Stimulating Hormone, Serum in AM (01.05.24 @ 06:16) - 3.55 uIU/mL    #Iron deposits spleen/ liver   #Hemochromatosis w/u   -patient brought in for AMS found to have metabolic encephalopathy r/t worsneing RF, Covid  -on admission patient w/ normocytic anemia iron labs appeared to have FLAKO-s/p IV Venofer 200mg x5 days  -now s/p MRI a/p to r/o renal mass showing iron deposites on liver and spleen ?secondary hemechromatosis  -patient denies any familial hx of to much iron, denies taking any iron containing supplements, or transfusions prior to hospitalization      #Normocytic anemia   -patient w/ normocytic anemia  -showing some FLAKO s/p 5 doses of 200mg IV iron  -likely multifactorial (infection, CKD, ?FLAKO)  RECS  -completing anemia w/u (B12, Folate)  -s/p ESAas per nephrology      Thank you for the referral. Will continue to monitor the patient.  Please call with any questions [phone]  Above reviewed with Attending Dr. Cortes    [address] [address]  [phone]  *Note not finalized until signed by Attending Physician         22 y/o female with hx of HTN, Hypothyroidism, Anemia, CKD stage 4/ atrophic kidney disease seen by Dr. Melvin Salas ([phone]). Brought to [facility] w/ AMS found to be covid (+)/ metabolic encephalopathy. Patient being w/u MRI showing iron deposits in liver and spleen ?hemochromatoses     CT A/P 1/1/24-Atrophic kidneys with bilateral indeterminate renal masses,, which cannot be further characterized without contrast, Mildly prominent retroperitoneal lymph nodes, Cardiomegaly  MRI A/P 1/11/24-Extensive bilateral asymmetric renal cortical scarring and atrophy resulting in distorted and lobulated appearance of the renal contour accounting for of the masslike appearance on prior CT. No evidence of renal mass, Diffuse signal changes of iron deposition in the liver and spleen. ? secondary hemochromatosis.    Iron with Total Binding Capacity in AM (01.02.24 @ 02:49)   % Saturation, Iron: 7 %  Iron - Total Binding Capacity.: 243 ug/dL  Iron Total: 16 ug/dLFerritin in AM (01.02.24 @ 02:49)   Ferritin: 77 ng/mL  Thyroid Stimulating Hormone, Serum in AM (01.05.24 @ 06:16) - 3.55 uIU/mL    #Iron deposits spleen/ liver   #Hemochromatosis w/u   -patient brought in for AMS found to have metabolic encephalopathy r/t worsneing RF, Covid  -on admission patient w/ normocytic anemia iron labs appeared to have FLAKO-s/p IV Venofer 200mg x5 days  -now s/p MRI a/p to r/o renal mass showing iron deposites on liver and spleen ?secondary hemechromatosis  -patient denies any familial hx of to much iron, denies taking any iron containing supplements, or transfusions prior to hospitalization      #Normocytic anemia   -patient w/ normocytic anemia  -showing some FLAKO s/p 5 doses of 200mg IV iron  -likely multifactorial (infection, CKD, ?FLAKO)  RECS  -completing anemia w/u (B12, Folate)  -s/p ESAas per nephrology      Thank you for the referral. Will continue to monitor the patient.  Please call with any questions [phone]  Above reviewed with Attending Dr. Cortes    [address] [address]  [phone]  *Note not finalized until signed by Attending Physician         22 y/o female with hx of HTN, Hypothyroidism, Anemia, CKD stage 4/ atrophic kidney disease seen by Dr. Melvin Salas ([phone]). Brought to [facility] w/ AMS found to be covid (+)/ metabolic encephalopathy. Patient being w/u MRI showing iron deposits in liver and spleen ?hemochromatoses     CT A/P 1/1/24-Atrophic kidneys with bilateral indeterminate renal masses,, which cannot be further characterized without contrast, Mildly prominent retroperitoneal lymph nodes, Cardiomegaly  MRI A/P 1/11/24-Extensive bilateral asymmetric renal cortical scarring and atrophy resulting in distorted and lobulated appearance of the renal contour accounting for of the masslike appearance on prior CT. No evidence of renal mass, Diffuse signal changes of iron deposition in the liver and spleen. ? secondary hemochromatosis.    Iron with Total Binding Capacity in AM (01.02.24 @ 02:49)   % Saturation, Iron: 7 %  Iron - Total Binding Capacity.: 243 ug/dL  Iron Total: 16 ug/dLFerritin in AM (01.02.24 @ 02:49)   Ferritin: 77 ng/mL  Thyroid Stimulating Hormone, Serum in AM (01.05.24 @ 06:16) - 3.55 uIU/mL    #Iron deposits spleen/ liver   #Hemochromatosis w/u   -patient brought in for AMS found to have metabolic encephalopathy r/t worsneing RF, Covid  -on admission patient w/ normocytic anemia iron labs appeared to have FLAKO-s/p IV Venofer 200mg x5 days  -now s/p MRI a/p to r/o renal mass showing iron deposits on liver and spleen ?secondary hemechromatosis  -patient denies any familial hx of to much iron, denies taking any iron containing supplements, or transfusions prior to hospitalization  RECS  -No further w/u needed IP, patient w/o iron overload  -patient can f/u w/ heme OP in 1-2months for further consideration of w/u  - Patient  can f/u OP with Heme/ONC 1-2months when deemed stable for d/c- [facility] [place] [address] [place] [address] [phone]       #Normocytic anemia   -patient w/ normocytic anemia  -bili low-low suspicion of hemolytic process   -s/p 5 doses of 200mg IV iron  -on folic acid   -likely multifactorial (infection, CKD, ?FLAKO)  RECS  -completing anemia w/u (B12, Folate)  -s/p MARY as per nephrology  -f/u OP      Thank you for the referral. Will continue to monitor the patient.  Please call with any questions [phone]  Above reviewed with Attending Dr. Cortes    [place] [address] [place] [address]  [phone]  *Note not finalized until signed by Attending Physician         24 y/o female with hx of HTN, Hypothyroidism, Anemia, CKD stage 4/ atrophic kidney disease seen by Dr. Melvin Salas ([phone]). Brought to [facility] w/ AMS found to be covid (+)/ metabolic encephalopathy. Patient being w/u MRI showing iron deposits in liver and spleen ?hemochromatoses     CT A/P 1/1/24-Atrophic kidneys with bilateral indeterminate renal masses,, which cannot be further characterized without contrast, Mildly prominent retroperitoneal lymph nodes, Cardiomegaly  MRI A/P 1/11/24-Extensive bilateral asymmetric renal cortical scarring and atrophy resulting in distorted and lobulated appearance of the renal contour accounting for of the masslike appearance on prior CT. No evidence of renal mass, Diffuse signal changes of iron deposition in the liver and spleen. ? secondary hemochromatosis.    Iron with Total Binding Capacity in AM (01.02.24 @ 02:49)   % Saturation, Iron: 7 %  Iron - Total Binding Capacity.: 243 ug/dL  Iron Total: 16 ug/dLFerritin in AM (01.02.24 @ 02:49)   Ferritin: 77 ng/mL  Thyroid Stimulating Hormone, Serum in AM (01.05.24 @ 06:16) - 3.55 uIU/mL    #Iron deposits spleen/ liver   #Hemochromatosis w/u   -patient brought in for AMS found to have metabolic encephalopathy r/t worsneing RF, Covid  -on admission patient w/ normocytic anemia iron labs appeared to have FLAKO-s/p IV Venofer 200mg x5 days  -now s/p MRI a/p to r/o renal mass showing iron deposits on liver and spleen ?secondary hemechromatosis  -patient denies any familial hx of to much iron, denies taking any iron containing supplements, or transfusions prior to hospitalization  RECS  -No further w/u needed IP, patient w/o iron overload  -patient can f/u w/ heme OP in 1-2months for further consideration of w/u  - Patient  can f/u OP with Heme/ONC 1-2months when deemed stable for d/c- [facility] [place] [address] [place] [address] [phone]       #Normocytic anemia   -patient w/ normocytic anemia  -bili low-low suspicion of hemolytic process   -s/p 5 doses of 200mg IV iron  -on folic acid   -likely multifactorial (infection, CKD, ?FLAKO)  RECS  -completing anemia w/u (B12, Folate)  -s/p MARY as per nephrology  -f/u OP      Thank you for the referral. Will continue to monitor the patient.  Please call with any questions [phone]  Above reviewed with Attending Dr. Cortes    [place] [address] [place] [address]  [phone]  *Note not finalized until signed by Attending Physician         22 y/o female with hx of HTN, Hypothyroidism, Anemia, CKD stage 4/ atrophic kidney disease seen by Dr. Melvin Salas ([phone]). Brought to [facility] w/ AMS found to be covid (+)/ metabolic encephalopathy. Patient being w/u for ?renal mass(negative)now w/ MRI showing iron deposits in liver and spleen ? 2ndary hemochromatoses     CT A/P 1/1/24-Atrophic kidneys with bilateral indeterminate renal masses,, which cannot be further characterized without contrast, Mildly prominent retroperitoneal lymph nodes, Cardiomegaly  MRI A/P 1/11/24-Extensive bilateral asymmetric renal cortical scarring and atrophy resulting in distorted and lobulated appearance of the renal contour accounting for of the masslike appearance on prior CT. No evidence of renal mass, Diffuse signal changes of iron deposition in the liver and spleen. ? secondary hemochromatosis.    Iron with Total Binding Capacity in AM (01.02.24 @ 02:49)   % Saturation, Iron: 7 %  Iron - Total Binding Capacity.: 243 ug/dL  Iron Total: 16 ug/dLFerritin in AM (01.02.24 @ 02:49)   Ferritin: 77 ng/mL  Thyroid Stimulating Hormone, Serum in AM (01.05.24 @ 06:16) - 3.55 uIU/mL    #Iron deposits spleen/ liver   #Hemochromatosis w/u   -patient brought in for AMS found to have metabolic encephalopathy r/t worsneing RF, Covid  -on admission patient w/ normocytic anemia iron labs appeared to have FLAKO-s/p IV Venofer 200mg x5 days  -now s/p MRI a/p to r/o renal mass showing iron deposits on liver and spleen ?secondary hemechromatosis  -patient denies any familial hx of to much iron, denies taking any iron containing supplements, or transfusions prior to hospitalization  RECS  -No further w/u needed IP, patient w/o iron overload  -patient can f/u w/ heme OP in 1-2months for further consideration of w/u  - Patient  can f/u OP with Heme/ONC 1-2months when deemed stable for d/c- [address] [id number] [phone]       #Normocytic anemia   -patient w/ normocytic anemia  -bili low-low suspicion of hemolytic process   -s/p 5 doses of 200mg IV iron  -on folic acid   -likely multifactorial (infection, CKD, ?FLAKO)  RECS  -completing anemia w/u (B12, Folate)  -s/p MARY as per nephrology  -f/u OP      Thank you for the referral. Will continue to monitor the patient.  Please call with any questions [phone]  Above reviewed with Attending Dr. Cortes    [place] [address] [place] [address]  [phone]  *Note not finalized until signed by Attending Physician         22 y/o female with hx of HTN, Hypothyroidism, Anemia, CKD stage 4/ atrophic kidney disease seen by Dr. Melvin Salas ([phone]). Brought to [facility] w/ AMS found to be covid (+)/ metabolic encephalopathy. Patient being w/u for ?renal mass(negative)now w/ MRI showing iron deposits in liver and spleen ? 2ndary hemochromatoses     CT A/P 1/1/24-Atrophic kidneys with bilateral indeterminate renal masses,, which cannot be further characterized without contrast, Mildly prominent retroperitoneal lymph nodes, Cardiomegaly  MRI A/P 1/11/24-Extensive bilateral asymmetric renal cortical scarring and atrophy resulting in distorted and lobulated appearance of the renal contour accounting for of the masslike appearance on prior CT. No evidence of renal mass, Diffuse signal changes of iron deposition in the liver and spleen. ? secondary hemochromatosis.    Iron with Total Binding Capacity in AM (01.02.24 @ 02:49)   % Saturation, Iron: 7 %  Iron - Total Binding Capacity.: 243 ug/dL  Iron Total: 16 ug/dLFerritin in AM (01.02.24 @ 02:49)   Ferritin: 77 ng/mL  Thyroid Stimulating Hormone, Serum in AM (01.05.24 @ 06:16) - 3.55 uIU/mL    #Iron deposits spleen/ liver   #Hemochromatosis w/u   -patient brought in for AMS found to have metabolic encephalopathy r/t worsneing RF, Covid  -on admission patient w/ normocytic anemia iron labs appeared to have FLAKO-s/p IV Venofer 200mg x5 days  -now s/p MRI a/p to r/o renal mass showing iron deposits on liver and spleen ?secondary hemechromatosis  -patient denies any familial hx of to much iron, denies taking any iron containing supplements, or transfusions prior to hospitalization  RECS  -No further w/u needed IP, patient w/o iron overload  -patient can f/u w/ heme OP in 1-2months for further consideration of w/u  - Patient  can f/u OP with Heme/ONC 1-2months when deemed stable for d/c- [address] [id number] [phone]       #Normocytic anemia   -patient w/ normocytic anemia  -bili low-low suspicion of hemolytic process   -s/p 5 doses of 200mg IV iron  -on folic acid   -likely multifactorial (infection, CKD, ?FLAKO)  RECS  -completing anemia w/u (B12, Folate)  -s/p MARY as per nephrology  -f/u OP      Thank you for the referral. Will continue to monitor the patient.  Please call with any questions [phone]  Above reviewed with Attending Dr. Cortes    [place] [address] [place] [address]  [phone]  *Note not finalized until signed by Attending Physician         24 y/o female with hx of HTN, Hypothyroidism, Anemia, CKD stage 4/ atrophic kidney disease seen by Dr. Melvin Salas ([phone]). Brought to [facility] w/ AMS found to be covid (+)/ metabolic encephalopathy. Patient being w/u for ?renal mass(negative)now w/ MRI showing iron deposits in liver and spleen ? 2ndary hemochromatoses     CT A/P 1/1/24-Atrophic kidneys with bilateral indeterminate renal masses,, which cannot be further characterized without contrast, Mildly prominent retroperitoneal lymph nodes, Cardiomegaly  MRI A/P 1/11/24-Extensive bilateral asymmetric renal cortical scarring and atrophy resulting in distorted and lobulated appearance of the renal contour accounting for of the masslike appearance on prior CT. No evidence of renal mass, Diffuse signal changes of iron deposition in the liver and spleen. ? secondary hemochromatosis.    Iron with Total Binding Capacity in AM (01.02.24 @ 02:49)   % Saturation, Iron: 7 %  Iron - Total Binding Capacity.: 243 ug/dL  Iron Total: 16 ug/dLFerritin in AM (01.02.24 @ 02:49)   Ferritin: 77 ng/mL  Thyroid Stimulating Hormone, Serum in AM (01.05.24 @ 06:16) - 3.55 uIU/mL    #Iron deposits spleen/ liver   #Hemochromatosis w/u   -patient brought in for AMS found to have metabolic encephalopathy r/t worsneing RF, Covid  -on admission patient w/ normocytic anemia iron labs appeared to have FLAKO-s/p IV Venofer 200mg x5 days  -now s/p MRI a/p to r/o renal mass showing iron deposits on liver and spleen ?secondary hemechromatosis  -patient denies any familial hx of to much iron, denies taking any iron containing supplements, or transfusions prior to hospitalization  RECS  -iron deposits picked up on MRI can possibly be r/t recent 5 day coarse of  IV iron  -No further w/u needed IP, patient with out iron overload  -patient can f/u w/ heme OP in 1-2months for further consideration of w/u  - Patient  can f/u OP with Heme/ONC 1-2months when deemed stable for d/c- [place] [address] [place] [address] [phone]       #Normocytic anemia   -patient w/ normocytic anemia  -bili low-low suspicion of hemolytic process   -s/p 5 doses of 200mg IV iron  -on folic acid   -likely multifactorial (infection, CKD, ?FLAKO)  RECS  -completing anemia w/u (B12, Folate)  -s/p MARY as per nephrology  -f/u OP      Thank you for the referral. Will continue to monitor the patient.  Please call with any questions [phone]  Above reviewed with Attending Dr. Cortes    [facility] [place] [address] [place] [address]  [phone]  *Note not finalized until signed by Attending Physician         22 y/o female with hx of HTN, Hypothyroidism, Anemia, CKD stage 4/ atrophic kidney disease seen by Dr. Melvin Salas ([phone]). Brought to [facility] w/ AMS found to be covid (+)/ metabolic encephalopathy. Patient being w/u for ?renal mass(negative)now w/ MRI showing iron deposits in liver and spleen ? 2ndary hemochromatoses     CT A/P 1/1/24-Atrophic kidneys with bilateral indeterminate renal masses,, which cannot be further characterized without contrast, Mildly prominent retroperitoneal lymph nodes, Cardiomegaly  MRI A/P 1/11/24-Extensive bilateral asymmetric renal cortical scarring and atrophy resulting in distorted and lobulated appearance of the renal contour accounting for of the masslike appearance on prior CT. No evidence of renal mass, Diffuse signal changes of iron deposition in the liver and spleen. ? secondary hemochromatosis.    Iron with Total Binding Capacity in AM (01.02.24 @ 02:49)   % Saturation, Iron: 7 %  Iron - Total Binding Capacity.: 243 ug/dL  Iron Total: 16 ug/dLFerritin in AM (01.02.24 @ 02:49)   Ferritin: 77 ng/mL  Thyroid Stimulating Hormone, Serum in AM (01.05.24 @ 06:16) - 3.55 uIU/mL    #Iron deposits spleen/ liver   #Hemochromatosis w/u   -patient brought in for AMS found to have metabolic encephalopathy r/t worsneing RF, Covid  -on admission patient w/ normocytic anemia iron labs appeared to have FLAKO-s/p IV Venofer 200mg x5 days  -now s/p MRI a/p to r/o renal mass showing iron deposits on liver and spleen ?secondary hemechromatosis  -patient denies any familial hx of to much iron, denies taking any iron containing supplements, or transfusions prior to hospitalization  RECS  -iron deposits picked up on MRI can possibly be r/t recent 5 day coarse of  IV iron  -No further w/u needed IP, patient with out iron overload  -patient can f/u w/ heme OP in 1-2months for further consideration of w/u  - Patient  can f/u OP with Heme/ONC 1-2months when deemed stable for d/c- [place] [address] [place] [address] [phone]       #Normocytic anemia   -patient w/ normocytic anemia  -bili low-low suspicion of hemolytic process   -s/p 5 doses of 200mg IV iron  -on folic acid   -likely multifactorial (infection, CKD, ?FLAKO)  RECS  -completing anemia w/u (B12, Folate)  -s/p MARY as per nephrology  -f/u OP      Thank you for the referral. Will continue to monitor the patient.  Please call with any questions [phone]  Above reviewed with Attending Dr. Cortes    [facility] [place] [address] [place] [address]  [phone]  *Note not finalized until signed by Attending Physician         22 y/o female with hx of HTN, Hypothyroidism, Anemia, CKD stage 4/ atrophic kidney disease seen by Dr. Melvin Salas ([phone]). Brought to [facility] w/ AMS found to be covid (+)/ metabolic encephalopathy. Patient being w/u for ?renal mass(negative)now w/ MRI showing iron deposits in liver and spleen ? 2ndary hemochromatoses     CT A/P 1/1/24-Atrophic kidneys with bilateral indeterminate renal masses,, which cannot be further characterized without contrast, Mildly prominent retroperitoneal lymph nodes, Cardiomegaly  MRI A/P 1/11/24-Extensive bilateral asymmetric renal cortical scarring and atrophy resulting in distorted and lobulated appearance of the renal contour accounting for of the masslike appearance on prior CT. No evidence of renal mass, Diffuse signal changes of iron deposition in the liver and spleen. ? secondary hemochromatosis.    Iron with Total Binding Capacity in AM (01.02.24 @ 02:49)   % Saturation, Iron: 7 %  Iron - Total Binding Capacity.: 243 ug/dL  Iron Total: 16 ug/dLFerritin in AM (01.02.24 @ 02:49)   Ferritin: 77 ng/mL  Thyroid Stimulating Hormone, Serum in AM (01.05.24 @ 06:16) - 3.55 uIU/mL    #Iron deposits spleen/ liver, abnormal MRI abd    -patient brought in for AMS found to have metabolic encephalopathy r/t worsneing RF, Covid  -on admission patient w/ normocytic anemia iron labs appeared to have FLAKO-s/p IV Venofer 200mg x5 days  -now s/p MRI a/p to r/o renal mass showing iron deposits on liver and spleen ?secondary hemechromatosis  -patient denies any familial hx of to much iron, denies taking any iron containing supplements, or transfusions prior to hospitalization    RECS  -iron deposits picked up on MRI most likely due to 5 day coarse of  IV iron, d/w radiologist Dr. Cronin  -No further w/u needed  - Patient  can f/u OP with Heme/ONC 1-2months when deemed stable for d/c- [facility] [place] [address] [place] [address] [phone]       #Normocytic anemia   -patient w/ normocytic anemia  -bili low-low suspicion of hemolytic process   -s/p 5 doses of 200mg IV iron  -on folic acid   -likely multifactorial (infection, CKD, ?FLAKO)  RECS  -completing anemia w/u (B12, Folate)  -s/p MARY as per nephrology  -f/u OP      Thank you for the referral. Will continue to monitor the patient.  Please call with any questions [phone]  Above reviewed with Attending Dr. Cortes    [place] [address] [place] [address]  [phone]  *Note not finalized until signed by Attending Physician         22 y/o female with hx of HTN, Hypothyroidism, Anemia, CKD stage 4/ atrophic kidney disease seen by Dr. Melvin Salas ([phone]). Brought to [facility] w/ AMS found to be covid (+)/ metabolic encephalopathy. Patient being w/u for ?renal mass(negative)now w/ MRI showing iron deposits in liver and spleen ? 2ndary hemochromatoses     CT A/P 1/1/24-Atrophic kidneys with bilateral indeterminate renal masses,, which cannot be further characterized without contrast, Mildly prominent retroperitoneal lymph nodes, Cardiomegaly  MRI A/P 1/11/24-Extensive bilateral asymmetric renal cortical scarring and atrophy resulting in distorted and lobulated appearance of the renal contour accounting for of the masslike appearance on prior CT. No evidence of renal mass, Diffuse signal changes of iron deposition in the liver and spleen. ? secondary hemochromatosis.    Iron with Total Binding Capacity in AM (01.02.24 @ 02:49)   % Saturation, Iron: 7 %  Iron - Total Binding Capacity.: 243 ug/dL  Iron Total: 16 ug/dLFerritin in AM (01.02.24 @ 02:49)   Ferritin: 77 ng/mL  Thyroid Stimulating Hormone, Serum in AM (01.05.24 @ 06:16) - 3.55 uIU/mL    #Iron deposits spleen/ liver, abnormal MRI abd    -patient brought in for AMS found to have metabolic encephalopathy r/t worsneing RF, Covid  -on admission patient w/ normocytic anemia iron labs appeared to have FLAKO-s/p IV Venofer 200mg x5 days  -now s/p MRI a/p to r/o renal mass showing iron deposits on liver and spleen ?secondary hemechromatosis  -patient denies any familial hx of to much iron, denies taking any iron containing supplements, or transfusions prior to hospitalization    RECS  -iron deposits picked up on MRI most likely due to 5 day coarse of  IV iron, d/w radiologist Dr. Cronin  -No further w/u needed  - Patient  can f/u OP with Heme/ONC 1-2months when deemed stable for d/c- [facility] [place] [address] [place] [address] [phone]       #Normocytic anemia   -patient w/ normocytic anemia  -bili low-low suspicion of hemolytic process   -s/p 5 doses of 200mg IV iron  -on folic acid   -likely multifactorial (infection, CKD, ?FLAKO)  RECS  -completing anemia w/u (B12, Folate)  -s/p MARY as per nephrology  -f/u OP      Thank you for the referral. Will continue to monitor the patient.  Please call with any questions [phone]  Above reviewed with Attending Dr. Cortes    [facility] [place] [address] [place] [address]  [phone]  *Note not finalized until signed by Attending Physician

## 2024-01-12 NOTE — DISCHARGE NOTE NURSING/CASE MANAGEMENT/SOCIAL WORK - NSDCFUADDAPPT_GEN_ALL_CORE_FT
F/U Appointment schedule For Thursday 1/18/2024 at 10 AM  At [facility] ([phone]) [address]  [facility] [address]  Please bring the discharge paperwork with you to your appointment           F/U Appointment schedule For Thursday 1/18/2024 at 10 AM  At [facility] ([phone] [address]  [facility] [address]  Please bring the discharge paperwork with you to your appointment

## 2024-01-12 NOTE — PROGRESS NOTE ADULT - SUBJECTIVE AND OBJECTIVE BOX
NEPHROLOGY INTERVAL HPI/OVERNIGHT EVENTS:    Feels better   Wants to go home   MRI done     MEDICATIONS  (STANDING):  acetaminophen   IVPB .. 1000 milliGRAM(s) IV Intermittent once  calcitriol   Capsule 0.25 MICROGram(s) Oral daily  calcium carbonate    500 mG (Tums) Chewable 1 Tablet(s) Chew two times a day  epoetin dev (PROCRIT) Injectable 41241 Unit(s) SubCutaneous every 7 days  fludroCORTISONE 0.1 milliGRAM(s) Oral daily  folic acid 1 milliGRAM(s) Oral daily  levothyroxine 25 MICROGram(s) Oral daily  multivitamin 1 Tablet(s) Oral daily  pantoprazole    Tablet 40 milliGRAM(s) Oral before breakfast  sodium bicarbonate 650 milliGRAM(s) Oral three times a day  sodium chloride 0.45% 1000 milliLiter(s) (100 mL/Hr) IV Continuous <Continuous>  sodium chloride 0.9% lock flush 3 milliLiter(s) IV Push every 8 hours    MEDICATIONS  (PRN):  acetaminophen     Tablet .. 650 milliGRAM(s) Oral every 6 hours PRN Temp greater or equal to 38C (100.4F), Mild Pain (1 - 3)  albuterol    90 MICROgram(s) HFA Inhaler 2 Puff(s) Inhalation every 6 hours PRN Shortness of Breath and/or Wheezing  aluminum hydroxide/magnesium hydroxide/simethicone Suspension 30 milliLiter(s) Oral every 4 hours PRN Dyspepsia  guaiFENesin Oral Liquid (Sugar-Free) 100 milliGRAM(s) Oral every 6 hours PRN Cough  melatonin 3 milliGRAM(s) Oral at bedtime PRN Insomnia  ondansetron Injectable 4 milliGRAM(s) IV Push every 8 hours PRN Nausea and/or Vomiting      Allergies    No Known Allergies    Intolerances        Vital Signs Last 24 Hrs  T(C): 36.6 (12 Jan 2024 11:11), Max: 36.9 (11 Jan 2024 16:44)  T(F): 97.8 (12 Jan 2024 11:11), Max: 98.5 (12 Jan 2024 05:18)  HR: 82 (12 Jan 2024 11:11) (78 - 90)  BP: 95/61 (12 Jan 2024 11:11) (95/61 - 100/61)  BP(mean): --  RR: 18 (12 Jan 2024 11:11) (18 - 18)  SpO2: 97% (12 Jan 2024 11:11) (97% - 98%)    Parameters below as of 12 Jan 2024 05:18  Patient On (Oxygen Delivery Method): room air      Daily     Daily   I&O's Detail    I&O's Summary              PHYSICAL EXAM:  GENERAL: Comfortable  HEENT: No periorbital edema  NECK: Supple; no JVD  NERVOUS SYSTEM: Awake, alert  EXTREMITIES: no dependent edema    < from: MR Abdomen w/ IV Cont (01.11.24 @ 15:24) >    ACC: [id number] EXAM:  MR ABDOMEN IC   ORDERED BY: JANET SMART     PROCEDURE DATE:  01/11/2024          INTERPRETATION:  CLINICAL INFORMATION: Chronic kidney disease with renal   mass    COMPARISON: CT abdomen and pelvis 1/1/2024    CONTRAST/COMPLICATIONS:  IV Contrast: Gadavist  5 cc administered   2.5 cc discarded  Oral Contrast: NONE  Complications: None reported at time of study completion    PROCEDURE:  MRI of the abdomen was performed.    FINDINGS:  LOWER CHEST: Clear.    LIVER: Diffuse signal changes of iron deposition  BILE DUCTS: Nondilated.  GALLBLADDER: Normal.  SPLEEN: Normal size. Diffuse signal changes of iron deposition  PANCREAS: Normal.  ADRENALS: Normal.  KIDNEYS/URETERS: Extensive bilateral renal cortical lobulationand   scarring with bilateral atrophy. No discrete solid renal mass. A few   small renal cysts measuring up to 1.2 cm on the right. No hydronephrosis   or perinephric abnormality.    VISUALIZED PORTIONS:  BOWEL: No bowel-related abnormality.  PERITONEUM: No ascites.  VESSELS: Normal caliber aorta.  RETROPERITONEUM/LYMPH NODES: No adenopathy.  ABDOMINAL WALL: Normal.  BONES: No aggressive lesion.    IMPRESSION:  *  Extensive bilateral asymmetric renal cortical scarring and atrophy   resulting in distorted and lobulated appearance of the renal contour   accounting for of the masslike appearance on prior CT. No evidence of   renal mass.  *  Diffuse signal changes of iron deposition in the liver and spleen.   Correlate for secondary hemochromatosis.      --- End of Report ---            CAR CAREY MD; Attending Radiologist  This document has been electronically signed. Jan 12 2024 12:20PM    < end of copied text >    LABS:                        9.1    11.51 )-----------( 218      ( 12 Jan 2024 05:15 )             30.4     01-12    139  |  104  |  33.7<H>  ----------------------------<  88  3.7   |  21.0<L>  |  3.41<H>    Ca    8.9      12 Jan 2024 05:15    TPro  6.0<L>  /  Alb  3.4  /  TBili  <0.2<L>  /  DBili  x   /  AST  25  /  ALT  50<H>  /  AlkPhos  66  01-12      Urinalysis Basic - ( 12 Jan 2024 05:15 )    Color: x / Appearance: x / SG: x / pH: x            Gluc: 88 mg/dL / Ketone: x  / Bili: x / Urobili: x   Blood: x / Protein: x / Nitrite: x   Leuk Esterase: x / RBC: x / WBC x   Sq Epi: x / Non Sq Epi: x / Bacteria: x              RADIOLOGY & ADDITIONAL TESTS:   NEPHROLOGY INTERVAL HPI/OVERNIGHT EVENTS:    Feels better   Wants to go home   MRI done     MEDICATIONS  (STANDING):  acetaminophen   IVPB .. 1000 milliGRAM(s) IV Intermittent once  calcitriol   Capsule 0.25 MICROGram(s) Oral daily  calcium carbonate    500 mG (Tums) Chewable 1 Tablet(s) Chew two times a day  epoetin dev (PROCRIT) Injectable 43426 Unit(s) SubCutaneous every 7 days  fludroCORTISONE 0.1 milliGRAM(s) Oral daily  folic acid 1 milliGRAM(s) Oral daily  levothyroxine 25 MICROGram(s) Oral daily  multivitamin 1 Tablet(s) Oral daily  pantoprazole    Tablet 40 milliGRAM(s) Oral before breakfast  sodium bicarbonate 650 milliGRAM(s) Oral three times a day  sodium chloride 0.45% 1000 milliLiter(s) (100 mL/Hr) IV Continuous <Continuous>  sodium chloride 0.9% lock flush 3 milliLiter(s) IV Push every 8 hours    MEDICATIONS  (PRN):  acetaminophen     Tablet .. 650 milliGRAM(s) Oral every 6 hours PRN Temp greater or equal to 38C (100.4F), Mild Pain (1 - 3)  albuterol    90 MICROgram(s) HFA Inhaler 2 Puff(s) Inhalation every 6 hours PRN Shortness of Breath and/or Wheezing  aluminum hydroxide/magnesium hydroxide/simethicone Suspension 30 milliLiter(s) Oral every 4 hours PRN Dyspepsia  guaiFENesin Oral Liquid (Sugar-Free) 100 milliGRAM(s) Oral every 6 hours PRN Cough  melatonin 3 milliGRAM(s) Oral at bedtime PRN Insomnia  ondansetron Injectable 4 milliGRAM(s) IV Push every 8 hours PRN Nausea and/or Vomiting      Allergies    No Known Allergies    Intolerances        Vital Signs Last 24 Hrs  T(C): 36.6 (12 Jan 2024 11:11), Max: 36.9 (11 Jan 2024 16:44)  T(F): 97.8 (12 Jan 2024 11:11), Max: 98.5 (12 Jan 2024 05:18)  HR: 82 (12 Jan 2024 11:11) (78 - 90)  BP: 95/61 (12 Jan 2024 11:11) (95/61 - 100/61)  BP(mean): --  RR: 18 (12 Jan 2024 11:11) (18 - 18)  SpO2: 97% (12 Jan 2024 11:11) (97% - 98%)    Parameters below as of 12 Jan 2024 05:18  Patient On (Oxygen Delivery Method): room air      Daily     Daily   I&O's Detail    I&O's Summary              PHYSICAL EXAM:  GENERAL: Comfortable  HEENT: No periorbital edema  NECK: Supple; no JVD  NERVOUS SYSTEM: Awake, alert  EXTREMITIES: no dependent edema    < from: MR Abdomen w/ IV Cont (01.11.24 @ 15:24) >    ACC: [id number] EXAM:  MR ABDOMEN IC   ORDERED BY: JANET SMART     PROCEDURE DATE:  01/11/2024          INTERPRETATION:  CLINICAL INFORMATION: Chronic kidney disease with renal   mass    COMPARISON: CT abdomen and pelvis 1/1/2024    CONTRAST/COMPLICATIONS:  IV Contrast: Gadavist  5 cc administered   2.5 cc discarded  Oral Contrast: NONE  Complications: None reported at time of study completion    PROCEDURE:  MRI of the abdomen was performed.    FINDINGS:  LOWER CHEST: Clear.    LIVER: Diffuse signal changes of iron deposition  BILE DUCTS: Nondilated.  GALLBLADDER: Normal.  SPLEEN: Normal size. Diffuse signal changes of iron deposition  PANCREAS: Normal.  ADRENALS: Normal.  KIDNEYS/URETERS: Extensive bilateral renal cortical lobulationand   scarring with bilateral atrophy. No discrete solid renal mass. A few   small renal cysts measuring up to 1.2 cm on the right. No hydronephrosis   or perinephric abnormality.    VISUALIZED PORTIONS:  BOWEL: No bowel-related abnormality.  PERITONEUM: No ascites.  VESSELS: Normal caliber aorta.  RETROPERITONEUM/LYMPH NODES: No adenopathy.  ABDOMINAL WALL: Normal.  BONES: No aggressive lesion.    IMPRESSION:  *  Extensive bilateral asymmetric renal cortical scarring and atrophy   resulting in distorted and lobulated appearance of the renal contour   accounting for of the masslike appearance on prior CT. No evidence of   renal mass.  *  Diffuse signal changes of iron deposition in the liver and spleen.   Correlate for secondary hemochromatosis.      --- End of Report ---            CAR CAREY MD; Attending Radiologist  This document has been electronically signed. Jan 12 2024 12:20PM    < end of copied text >    LABS:                        9.1    11.51 )-----------( 218      ( 12 Jan 2024 05:15 )             30.4     01-12    139  |  104  |  33.7<H>  ----------------------------<  88  3.7   |  21.0<L>  |  3.41<H>    Ca    8.9      12 Jan 2024 05:15    TPro  6.0<L>  /  Alb  3.4  /  TBili  <0.2<L>  /  DBili  x   /  AST  25  /  ALT  50<H>  /  AlkPhos  66  01-12      Urinalysis Basic - ( 12 Jan 2024 05:15 )    Color: x / Appearance: x / SG: x / pH: x            Gluc: 88 mg/dL / Ketone: x  / Bili: x / Urobili: x   Blood: x / Protein: x / Nitrite: x   Leuk Esterase: x / RBC: x / WBC x   Sq Epi: x / Non Sq Epi: x / Bacteria: x              RADIOLOGY & ADDITIONAL TESTS:

## 2024-01-12 NOTE — DISCHARGE NOTE PROVIDER - NSDCFUADDAPPT_GEN_ALL_CORE_FT
F/U Appointment schedule For Thursday 1/18/2024 at 10 AM  At [facility] ([phone] [address]  [facility] [address]  Please bring the discharge paperwork with you to your appointment           F/U Appointment schedule For Thursday 1/18/2024 at 10 AM  At [facility] ([phone] [address]  [facility] [address]  Please bring the discharge paperwork with you to your appointment

## 2024-01-12 NOTE — DISCHARGE NOTE PROVIDER - PROVIDER TOKENS
PROVIDER:[TOKEN:[[id number]]],PROVIDER:[TOKEN:[[id number]]],PROVIDER:[TOKEN:[[id number]]] PROVIDER:[TOKEN:[[id number]]],PROVIDER:[TOKEN:[[id number]]],PROVIDER:[TOKEN:[[id number]]]

## 2024-01-12 NOTE — DISCHARGE NOTE PROVIDER - CARE PROVIDER_API CALL
Melvin Salas  Nephrology  [address] [place] [address]  Phone: [phone]  Fax: [phone]  Follow Up Time:     Bozena Burnett  Endocrinology/Metab/Diabetes  [address] [place] [address]  Phone: [phone]  Fax: [phone]  Follow Up Time:     Mona Cortes  Hematology  [address] [place] [address]  Phone: [phone]  Fax: [phone]  Follow Up Time:    Melvin Salas  Nephrology  [address] [place] [address]  Phone: [phone]  Fax: [phone]  Follow Up Time:     Bozena Burnett  Endocrinology/Metab/Diabetes  [address] [place] [address]  Phone: [phone]  Fax: [phone]  Follow Up Time:     Mona Cortes  Hematology  [address] [place] [address]  Phone: [phone]  Fax: [phone]  Follow Up Time:

## 2024-01-12 NOTE — CONSULT NOTE ADULT - REASON FOR ADMISSION
MARIVEL on CKD?  Confusion

## 2024-01-12 NOTE — DISCHARGE NOTE PROVIDER - HOSPITAL COURSE
22 y/o female whose real name is Theresa Tsang. brought from [facility] for evaluation. Patient was apparently in her normal state of health at a [employer] event and shortly after eating rice/beans and meat started c/o abdominal pain and cramping and then vomited. Friends who witnessed it said they called EMS due to patient being very agitated. She has a hx of "kidney problems" and used to be on medications for it but self discontinued " a while ago."Labs with Hbg of 8.6 and Cr. of 3.95 with reported baseline of 2.5. No reported mental health history. Patient now at mental baseline however hypotension in setting of possible AI? Sinus bradycardia and awaiting MR abdomen for renal lesions due to poor outpatient follow up. Patient creatinine reached a stable new baseline. She was cleared by nephrology and will need to follow outpatient. Patient had MRI done which showed 22 y/o female whose real name is Theresa Tsang. brought from [facility] for evaluation. Patient was apparently in her normal state of health at a [employer] event and shortly after eating rice/beans and meat started c/o abdominal pain and cramping and then vomited. Friends who witnessed it said they called EMS due to patient being very agitated. She has a hx of "kidney problems" and used to be on medications for it but self discontinued " a while ago."Labs with Hbg of 8.6 and Cr. of 3.95 with reported baseline of 2.5. No reported mental health history. Patient now at mental baseline however hypotension in setting of possible AI? Sinus bradycardia and awaiting MR abdomen for renal lesions due to poor outpatient follow up. Patient creatinine reached a stable new baseline. She was cleared by nephrology and will need to follow outpatient. Patient had MRI done which showed 22 y/o female whose real name is Theresa Tsang. brought from [facility] for evaluation. Patient was apparently in her normal state of health at a [employer] event and shortly after eating rice/beans and meat started c/o abdominal pain and cramping and then vomited. Friends who witnessed it said they called EMS due to patient being very agitated. She has a hx of "kidney problems" and used to be on medications for it but self discontinued " a while ago."Labs with Hbg of 8.6 and Cr. of 3.95 with reported baseline of 2.5. No reported mental health history. Patient now at mental baseline however hypotension in setting of possible AI? Sinus bradycardia and awaiting MR abdomen for renal lesions due to poor outpatient follow up. Patient creatinine reached a stable new baseline. She was cleared by nephrology and will need to follow outpatient. Patient had MRI done which showed no renal mass, however iron deposition in liver and spleen. Heme/onc recommended no further inpatient workup. She was cleared by endocrinology and will follow up outpatient. She is medically cleared for discharge. 24 y/o female whose real name is Theresa Tsang. brought from [facility] for evaluation. Patient was apparently in her normal state of health at a [employer] event and shortly after eating rice/beans and meat started c/o abdominal pain and cramping and then vomited. Friends who witnessed it said they called EMS due to patient being very agitated. She has a hx of "kidney problems" and used to be on medications for it but self discontinued " a while ago."Labs with Hbg of 8.6 and Cr. of 3.95 with reported baseline of 2.5. No reported mental health history. Patient now at mental baseline however hypotension in setting of possible AI? Sinus bradycardia and awaiting MR abdomen for renal lesions due to poor outpatient follow up. Patient creatinine reached a stable new baseline. She was cleared by nephrology and will need to follow outpatient. Patient had MRI done which showed no renal mass, however iron deposition in liver and spleen. Heme/onc recommended no further inpatient workup. She was cleared by endocrinology and will follow up outpatient. She is medically cleared for discharge.

## 2024-01-12 NOTE — PROGRESS NOTE ADULT - SUBJECTIVE AND OBJECTIVE BOX
INTERVAL EVENTS:  follow up for thyroid/adrenal  Translation ID: [id number]    ROS: Reports feeling good. No complaints.    MEDICATIONS  (STANDING):  acetaminophen   IVPB .. 1000 milliGRAM(s) IV Intermittent once  calcitriol   Capsule 0.25 MICROGram(s) Oral daily  calcium carbonate    500 mG (Tums) Chewable 1 Tablet(s) Chew two times a day  epoetin dev (PROCRIT) Injectable 94118 Unit(s) SubCutaneous every 7 days  fludroCORTISONE 0.1 milliGRAM(s) Oral daily  folic acid 1 milliGRAM(s) Oral daily  levothyroxine 25 MICROGram(s) Oral daily  multivitamin 1 Tablet(s) Oral daily  pantoprazole    Tablet 40 milliGRAM(s) Oral before breakfast  sodium bicarbonate 650 milliGRAM(s) Oral three times a day  sodium chloride 0.45% 1000 milliLiter(s) (100 mL/Hr) IV Continuous <Continuous>  sodium chloride 0.9% lock flush 3 milliLiter(s) IV Push every 8 hours    MEDICATIONS  (PRN):  acetaminophen     Tablet .. 650 milliGRAM(s) Oral every 6 hours PRN Temp greater or equal to 38C (100.4F), Mild Pain (1 - 3)  albuterol    90 MICROgram(s) HFA Inhaler 2 Puff(s) Inhalation every 6 hours PRN Shortness of Breath and/or Wheezing  aluminum hydroxide/magnesium hydroxide/simethicone Suspension 30 milliLiter(s) Oral every 4 hours PRN Dyspepsia  guaiFENesin Oral Liquid (Sugar-Free) 100 milliGRAM(s) Oral every 6 hours PRN Cough  melatonin 3 milliGRAM(s) Oral at bedtime PRN Insomnia  ondansetron Injectable 4 milliGRAM(s) IV Push every 8 hours PRN Nausea and/or Vomiting      Allergies  No Known Allergies      Vital Signs Last 24 Hrs  T(C): 36.9 (12 Jan 2024 05:18), Max: 36.9 (11 Jan 2024 16:44)  T(F): 98.5 (12 Jan 2024 05:18), Max: 98.5 (12 Jan 2024 05:18)  HR: 78 (12 Jan 2024 05:18) (72 - 90)  BP: 100/61 (12 Jan 2024 05:18) (98/56 - 102/66)  BP(mean): --  RR: 18 (12 Jan 2024 05:18) (18 - 18)  SpO2: 97% (12 Jan 2024 05:18) (97% - 100%)    Parameters below as of 12 Jan 2024 05:18  Patient On (Oxygen Delivery Method): room air        PHYSICAL EXAM:  GENERAL: NAD, comfortable  NECK: Supple; trachea midline  CHEST/LUNG: Clear to auscultation bilaterally; No wheezes  HEART: Regular rate and rhythm; No murmurs, rubs, or gallops  ABDOMEN: Soft, Nontender, Nondistended; Bowel sounds present  NEURO: AAOx3, no tremor  EXTREMITIES:  2+ Peripheral Pulses, no edema      LABS:                        9.1    11.51 )-----------( 218      ( 12 Jan 2024 05:15 )             30.4     01-12    139  |  104  |  33.7<H>  ----------------------------<  88  3.7   |  21.0<L>  |  3.41<H>    Ca    8.9      12 Jan 2024 05:15    TPro  6.0<L>  /  Alb  3.4  /  TBili  <0.2<L>  /  DBili  x   /  AST  25  /  ALT  50<H>  /  AlkPhos  66  01-12    Urinalysis Basic - ( 12 Jan 2024 05:15 )    Color: x / Appearance: x / SG: x / pH: x  Gluc: 88 mg/dL / Ketone: x  / Bili: x / Urobili: x   Blood: x / Protein: x / Nitrite: x   Leuk Esterase: x / RBC: x / WBC x   Sq Epi: x / Non Sq Epi: x / Bacteria: x                Free Thyroxine, Serum: 1.1 ng/dL (01-05-24 @ 06:16)  Thyroid Stimulating Hormone, Serum: 3.55 uIU/mL (01-05-24 @ 06:16)  Thyroid Stimulating Hormone, Serum: 1.99 uIU/mL (01-03-24 @ 06:42)     INTERVAL EVENTS:  follow up for thyroid/adrenal  Translation ID: [id number]    ROS: Reports feeling good. No complaints.    MEDICATIONS  (STANDING):  acetaminophen   IVPB .. 1000 milliGRAM(s) IV Intermittent once  calcitriol   Capsule 0.25 MICROGram(s) Oral daily  calcium carbonate    500 mG (Tums) Chewable 1 Tablet(s) Chew two times a day  epoetin dev (PROCRIT) Injectable 94544 Unit(s) SubCutaneous every 7 days  fludroCORTISONE 0.1 milliGRAM(s) Oral daily  folic acid 1 milliGRAM(s) Oral daily  levothyroxine 25 MICROGram(s) Oral daily  multivitamin 1 Tablet(s) Oral daily  pantoprazole    Tablet 40 milliGRAM(s) Oral before breakfast  sodium bicarbonate 650 milliGRAM(s) Oral three times a day  sodium chloride 0.45% 1000 milliLiter(s) (100 mL/Hr) IV Continuous <Continuous>  sodium chloride 0.9% lock flush 3 milliLiter(s) IV Push every 8 hours    MEDICATIONS  (PRN):  acetaminophen     Tablet .. 650 milliGRAM(s) Oral every 6 hours PRN Temp greater or equal to 38C (100.4F), Mild Pain (1 - 3)  albuterol    90 MICROgram(s) HFA Inhaler 2 Puff(s) Inhalation every 6 hours PRN Shortness of Breath and/or Wheezing  aluminum hydroxide/magnesium hydroxide/simethicone Suspension 30 milliLiter(s) Oral every 4 hours PRN Dyspepsia  guaiFENesin Oral Liquid (Sugar-Free) 100 milliGRAM(s) Oral every 6 hours PRN Cough  melatonin 3 milliGRAM(s) Oral at bedtime PRN Insomnia  ondansetron Injectable 4 milliGRAM(s) IV Push every 8 hours PRN Nausea and/or Vomiting      Allergies  No Known Allergies      Vital Signs Last 24 Hrs  T(C): 36.9 (12 Jan 2024 05:18), Max: 36.9 (11 Jan 2024 16:44)  T(F): 98.5 (12 Jan 2024 05:18), Max: 98.5 (12 Jan 2024 05:18)  HR: 78 (12 Jan 2024 05:18) (72 - 90)  BP: 100/61 (12 Jan 2024 05:18) (98/56 - 102/66)  BP(mean): --  RR: 18 (12 Jan 2024 05:18) (18 - 18)  SpO2: 97% (12 Jan 2024 05:18) (97% - 100%)    Parameters below as of 12 Jan 2024 05:18  Patient On (Oxygen Delivery Method): room air        PHYSICAL EXAM:  GENERAL: NAD, comfortable  NECK: Supple; trachea midline  CHEST/LUNG: Clear to auscultation bilaterally; No wheezes  HEART: Regular rate and rhythm; No murmurs, rubs, or gallops  ABDOMEN: Soft, Nontender, Nondistended; Bowel sounds present  NEURO: AAOx3, no tremor  EXTREMITIES:  2+ Peripheral Pulses, no edema      LABS:                        9.1    11.51 )-----------( 218      ( 12 Jan 2024 05:15 )             30.4     01-12    139  |  104  |  33.7<H>  ----------------------------<  88  3.7   |  21.0<L>  |  3.41<H>    Ca    8.9      12 Jan 2024 05:15    TPro  6.0<L>  /  Alb  3.4  /  TBili  <0.2<L>  /  DBili  x   /  AST  25  /  ALT  50<H>  /  AlkPhos  66  01-12    Urinalysis Basic - ( 12 Jan 2024 05:15 )    Color: x / Appearance: x / SG: x / pH: x  Gluc: 88 mg/dL / Ketone: x  / Bili: x / Urobili: x   Blood: x / Protein: x / Nitrite: x   Leuk Esterase: x / RBC: x / WBC x   Sq Epi: x / Non Sq Epi: x / Bacteria: x                Free Thyroxine, Serum: 1.1 ng/dL (01-05-24 @ 06:16)  Thyroid Stimulating Hormone, Serum: 3.55 uIU/mL (01-05-24 @ 06:16)  Thyroid Stimulating Hormone, Serum: 1.99 uIU/mL (01-03-24 @ 06:42)

## 2024-01-12 NOTE — DISCHARGE NOTE PROVIDER - NSDCMRMEDTOKEN_GEN_ALL_CORE_FT
calcitriol 0.25 mcg oral capsule: 1 cap(s) orally once a day  calcium carbonate 500 mg (200 mg elemental calcium) oral tablet, chewable: 1 tab(s) orally 2 times a day  fludrocortisone 0.1 mg oral tablet: 1 tab(s) orally once a day  folic acid 1 mg oral tablet: 1 tab(s) orally once a day  levothyroxine 25 mcg (0.025 mg) oral tablet: 1 tab(s) orally once a day  sodium bicarbonate 650 mg oral tablet: 1 tab(s) orally 3 times a day